# Patient Record
Sex: FEMALE | Race: WHITE | NOT HISPANIC OR LATINO | Employment: FULL TIME | URBAN - METROPOLITAN AREA
[De-identification: names, ages, dates, MRNs, and addresses within clinical notes are randomized per-mention and may not be internally consistent; named-entity substitution may affect disease eponyms.]

---

## 2019-03-25 ENCOUNTER — HOSPITAL ENCOUNTER (OUTPATIENT)
Facility: HOSPITAL | Age: 49
Setting detail: OBSERVATION
Discharge: HOME | End: 2019-03-27
Attending: EMERGENCY MEDICINE | Admitting: INTERNAL MEDICINE
Payer: COMMERCIAL

## 2019-03-25 ENCOUNTER — APPOINTMENT (EMERGENCY)
Dept: RADIOLOGY | Facility: HOSPITAL | Age: 49
End: 2019-03-25
Payer: COMMERCIAL

## 2019-03-25 DIAGNOSIS — E78.5 DYSLIPIDEMIA: ICD-10-CM

## 2019-03-25 DIAGNOSIS — E11.9 NEW ONSET TYPE 2 DIABETES MELLITUS (HCC): ICD-10-CM

## 2019-03-25 DIAGNOSIS — R07.9 CHEST PAIN: Primary | ICD-10-CM

## 2019-03-25 DIAGNOSIS — E11.9 DIABETES MELLITUS, NEW ONSET (HCC): ICD-10-CM

## 2019-03-25 DIAGNOSIS — G62.9 NEUROPATHY: ICD-10-CM

## 2019-03-25 PROBLEM — R73.9 HYPERGLYCEMIA: Status: ACTIVE | Noted: 2019-03-25

## 2019-03-25 PROBLEM — R03.0 ELEVATED BLOOD PRESSURE READING: Status: ACTIVE | Noted: 2019-03-25

## 2019-03-25 LAB
ALBUMIN SERPL BCP-MCNC: 4 G/DL (ref 3.5–5)
ALP SERPL-CCNC: 131 U/L (ref 46–116)
ALT SERPL W P-5'-P-CCNC: 39 U/L (ref 12–78)
ANION GAP SERPL CALCULATED.3IONS-SCNC: 10 MMOL/L (ref 4–13)
AST SERPL W P-5'-P-CCNC: 17 U/L (ref 5–45)
ATRIAL RATE: 73 BPM
BASOPHILS # BLD AUTO: 0.03 THOUSANDS/ΜL (ref 0–0.1)
BASOPHILS NFR BLD AUTO: 0 % (ref 0–1)
BILIRUB SERPL-MCNC: 0.4 MG/DL (ref 0.2–1)
BUN SERPL-MCNC: 11 MG/DL (ref 5–25)
CALCIUM SERPL-MCNC: 9.4 MG/DL (ref 8.3–10.1)
CHLORIDE SERPL-SCNC: 99 MMOL/L (ref 100–108)
CO2 SERPL-SCNC: 27 MMOL/L (ref 21–32)
CREAT SERPL-MCNC: 0.64 MG/DL (ref 0.6–1.3)
EOSINOPHIL # BLD AUTO: 0.21 THOUSAND/ΜL (ref 0–0.61)
EOSINOPHIL NFR BLD AUTO: 3 % (ref 0–6)
ERYTHROCYTE [DISTWIDTH] IN BLOOD BY AUTOMATED COUNT: 13.2 % (ref 11.6–15.1)
GFR SERPL CREATININE-BSD FRML MDRD: 104 ML/MIN/1.73SQ M
GLUCOSE SERPL-MCNC: 211 MG/DL (ref 65–140)
GLUCOSE SERPL-MCNC: 236 MG/DL (ref 65–140)
HCT VFR BLD AUTO: 41.6 % (ref 34.8–46.1)
HGB BLD-MCNC: 14.8 G/DL (ref 11.5–15.4)
IMM GRANULOCYTES # BLD AUTO: 0.02 THOUSAND/UL (ref 0–0.2)
IMM GRANULOCYTES NFR BLD AUTO: 0 % (ref 0–2)
LYMPHOCYTES # BLD AUTO: 2.96 THOUSANDS/ΜL (ref 0.6–4.47)
LYMPHOCYTES NFR BLD AUTO: 35 % (ref 14–44)
MCH RBC QN AUTO: 31 PG (ref 26.8–34.3)
MCHC RBC AUTO-ENTMCNC: 35.6 G/DL (ref 31.4–37.4)
MCV RBC AUTO: 87 FL (ref 82–98)
MONOCYTES # BLD AUTO: 0.61 THOUSAND/ΜL (ref 0.17–1.22)
MONOCYTES NFR BLD AUTO: 7 % (ref 4–12)
NEUTROPHILS # BLD AUTO: 4.72 THOUSANDS/ΜL (ref 1.85–7.62)
NEUTS SEG NFR BLD AUTO: 55 % (ref 43–75)
NRBC BLD AUTO-RTO: 0 /100 WBCS
P AXIS: 53 DEGREES
PLATELET # BLD AUTO: 288 THOUSANDS/UL (ref 149–390)
PMV BLD AUTO: 11.1 FL (ref 8.9–12.7)
POTASSIUM SERPL-SCNC: 3.8 MMOL/L (ref 3.5–5.3)
PR INTERVAL: 126 MS
PROT SERPL-MCNC: 7.6 G/DL (ref 6.4–8.2)
QRS AXIS: 0 DEGREES
QRSD INTERVAL: 82 MS
QT INTERVAL: 376 MS
QTC INTERVAL: 406 MS
RBC # BLD AUTO: 4.77 MILLION/UL (ref 3.81–5.12)
SODIUM SERPL-SCNC: 136 MMOL/L (ref 136–145)
T WAVE AXIS: 13 DEGREES
TROPONIN I SERPL-MCNC: <0.02 NG/ML
VENTRICULAR RATE: 70 BPM
WBC # BLD AUTO: 8.55 THOUSAND/UL (ref 4.31–10.16)

## 2019-03-25 PROCEDURE — 99285 EMERGENCY DEPT VISIT HI MDM: CPT

## 2019-03-25 PROCEDURE — 36415 COLL VENOUS BLD VENIPUNCTURE: CPT | Performed by: EMERGENCY MEDICINE

## 2019-03-25 PROCEDURE — 93010 ELECTROCARDIOGRAM REPORT: CPT | Performed by: INTERNAL MEDICINE

## 2019-03-25 PROCEDURE — 80053 COMPREHEN METABOLIC PANEL: CPT | Performed by: EMERGENCY MEDICINE

## 2019-03-25 PROCEDURE — 84484 ASSAY OF TROPONIN QUANT: CPT | Performed by: EMERGENCY MEDICINE

## 2019-03-25 PROCEDURE — 85025 COMPLETE CBC W/AUTO DIFF WBC: CPT | Performed by: EMERGENCY MEDICINE

## 2019-03-25 PROCEDURE — 82948 REAGENT STRIP/BLOOD GLUCOSE: CPT

## 2019-03-25 PROCEDURE — 93005 ELECTROCARDIOGRAM TRACING: CPT

## 2019-03-25 PROCEDURE — 99220 PR INITIAL OBSERVATION CARE/DAY 70 MINUTES: CPT | Performed by: INTERNAL MEDICINE

## 2019-03-25 PROCEDURE — 84484 ASSAY OF TROPONIN QUANT: CPT | Performed by: INTERNAL MEDICINE

## 2019-03-25 PROCEDURE — 71046 X-RAY EXAM CHEST 2 VIEWS: CPT

## 2019-03-25 RX ORDER — MAGNESIUM HYDROXIDE/ALUMINUM HYDROXICE/SIMETHICONE 120; 1200; 1200 MG/30ML; MG/30ML; MG/30ML
30 SUSPENSION ORAL EVERY 6 HOURS PRN
Status: DISCONTINUED | OUTPATIENT
Start: 2019-03-25 | End: 2019-03-27 | Stop reason: HOSPADM

## 2019-03-25 RX ORDER — ASPIRIN 325 MG
325 TABLET ORAL ONCE
Status: COMPLETED | OUTPATIENT
Start: 2019-03-25 | End: 2019-03-25

## 2019-03-25 RX ORDER — ACETAMINOPHEN 325 MG/1
650 TABLET ORAL EVERY 4 HOURS PRN
Status: DISCONTINUED | OUTPATIENT
Start: 2019-03-25 | End: 2019-03-27 | Stop reason: HOSPADM

## 2019-03-25 RX ORDER — HYDRALAZINE HYDROCHLORIDE 20 MG/ML
10 INJECTION INTRAMUSCULAR; INTRAVENOUS EVERY 6 HOURS PRN
Status: DISCONTINUED | OUTPATIENT
Start: 2019-03-25 | End: 2019-03-27 | Stop reason: HOSPADM

## 2019-03-25 RX ORDER — NITROGLYCERIN 0.4 MG/1
0.4 TABLET SUBLINGUAL
Status: DISCONTINUED | OUTPATIENT
Start: 2019-03-25 | End: 2019-03-27 | Stop reason: HOSPADM

## 2019-03-25 RX ORDER — TRAMADOL HYDROCHLORIDE 50 MG/1
50 TABLET ORAL EVERY 6 HOURS PRN
Status: DISCONTINUED | OUTPATIENT
Start: 2019-03-25 | End: 2019-03-27 | Stop reason: HOSPADM

## 2019-03-25 RX ORDER — MORPHINE SULFATE 4 MG/ML
4 INJECTION, SOLUTION INTRAMUSCULAR; INTRAVENOUS EVERY 4 HOURS PRN
Status: DISCONTINUED | OUTPATIENT
Start: 2019-03-25 | End: 2019-03-27 | Stop reason: HOSPADM

## 2019-03-25 RX ORDER — IBUPROFEN 600 MG/1
600 TABLET ORAL EVERY 6 HOURS PRN
Status: DISCONTINUED | OUTPATIENT
Start: 2019-03-25 | End: 2019-03-27 | Stop reason: HOSPADM

## 2019-03-25 RX ORDER — ONDANSETRON 2 MG/ML
4 INJECTION INTRAMUSCULAR; INTRAVENOUS EVERY 6 HOURS PRN
Status: DISCONTINUED | OUTPATIENT
Start: 2019-03-25 | End: 2019-03-27 | Stop reason: HOSPADM

## 2019-03-25 RX ORDER — ASPIRIN 81 MG/1
81 TABLET, CHEWABLE ORAL DAILY
Status: DISCONTINUED | OUTPATIENT
Start: 2019-03-26 | End: 2019-03-27 | Stop reason: HOSPADM

## 2019-03-25 RX ADMIN — INSULIN LISPRO 2 UNITS: 100 INJECTION, SOLUTION INTRAVENOUS; SUBCUTANEOUS at 22:36

## 2019-03-25 RX ADMIN — ASPIRIN 325 MG: 325 TABLET ORAL at 15:04

## 2019-03-25 NOTE — H&P
History and Physical - [facility] Internal Medicine    Patient Information: Jay Fothergill 46 y o  female MRN: [id number]  Unit/Bed#: PAPI Encounter: [id number]  Admitting Physician: Milagro Arroyo DO  PCP: Fran Verdin MD  Date of Admission:  03/25/19    Assessment/Plan:    Hospital Problem List:     Principal Problem:    Chest pain  Active Problems:    Hyperglycemia    Elevated blood pressure reading      Plan for the Primary Problem(s):  · Chest Pain -   · DARRON 1-2  · Trend troponins  Additional EKG testing with each troponin  · Telemetry monitoring  · For now, PRN nitro and scheduled 81 mg aspirin  · Given scattered ST changes and history of Lyme disease in past, will get Echocardiogram   · Most likely outpatient stress test unless additional concern  · Secondary risk assessments (A1c, monitoring of BP, and lipid profile)  · Former smoker, but not currently smoking  · Tylenol for mild pain, Motrin for moderate pain and tramadol for severe pain  Morphine for breakthrough pain  Plan for Additional Problems:   · Hyperglycemia - Check A1c  No known history of Diabetes  If diagnosed with diabetes this admission, would be a new diagnosis and would need diabetes education and appropriate medication initiation based on A1c level  For now just sliding scale insulin  · Elevated Blood Pressure - Add PRN hydralazine  Otherwise trend pressures at the present time  We only have a single blood pressure reading so far  No prior diagnosis of hypertension before  · Recent Bronchitis - treated 3/8 - 3/13 with Azithromycin and phenergan / codeine  No current symptoms, so not likely to be a factor in current chest pain complaint  VTE Prophylaxis: Enoxaparin (Lovenox)  / sequential compression device   Code Status: Level 1 Full Code    POLST: There is no POLST form on file for this patient (pre-hospital)    Anticipated Length of Stay:  Patient will be admitted on an Observation basis with an anticipated length of stay of < 2 midnights  Justification for Hospital Stay: Evaluation of chest pain  Total Time for Visit, including Counseling / Coordination of Care: 45 minutes  Greater than 50% of this total time spent on direct patient counseling and coordination of care  Chief Complaint:   Chest Pain    History of Present Illness:    Archana Sutherland is a 46 y o  female who presents with chest pain/pressure  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 and stated that she was on a one-to-one observation with the patient  The patient was well behaved and there was no anxiety present at that time  However, while sitting on the one-to-one observation, the patient began having a squeezing pressure type pain in the center of her chest that radiated towards the left shoulder  The patient did not have pain in the arm, neck, or back  The patient tried eating feeling that this may help with the symptoms but this did not seem to make any change  The patient did relate some nausea to this as well and the nausea improved after taking some ginger ale  The patient denied any shortness of breath, diaphoresis, palpitations, or dizziness  The patient states that she felt generally tired but also does admit that she only slept about 4 hours last night  The patient states that the only alleviating and exacerbating factor that she was able to determine was that pain would worsen when she thought about it and when improved when she was not thinking about it  The patient did not have any injuries to the chest and she did not have any recent heavy lifting, pushing/pulling, or prolonged overhead activity  The patient denies any numbness or paresthesias  The patient has not had any recent leg edema, calf pain, or orthopnea symptoms  The patient denies any current coughing although she was treated for bronchitis in the beginning of this month  The patient denies any associated fevers or chills    The patient states that when she walked the pain did not get worse  The patient came to the emergency department for evaluation and was given aspirin 325 mg  She states that this improved the pain to some degree but it is still not gone altogether  The patient denies any melena or hematochezia  The patient has had no vomiting  An EKG was done in the emergency department with some minimal J-point elevation in various leads which were non-contiguous in nature  Review of Systems:    Review of Systems   Constitutional: Positive for fatigue  Negative for activity change, appetite change, chills, diaphoresis and fever  HENT: Negative for congestion, mouth sores, rhinorrhea, sinus pressure, sinus pain, sore throat and tinnitus  Respiratory: Positive for chest tightness  Negative for shortness of breath  Cardiovascular: Positive for chest pain  Negative for palpitations and leg swelling  Gastrointestinal: Positive for nausea  Negative for abdominal pain, constipation, diarrhea and vomiting  Endocrine: Negative  Genitourinary: Negative  Negative for dysuria, flank pain, hematuria and pelvic pain  Musculoskeletal: Negative for arthralgias, back pain, myalgias and neck pain  Allergic/Immunologic: Negative  Neurological: Negative for dizziness, syncope, weakness, light-headedness, numbness and headaches  Hematological: Negative  Psychiatric/Behavioral: Negative  All other systems reviewed and are negative  Past Medical and Surgical History:     Past Medical History:   Diagnosis Date    Lyme disease     treated 6 years ago - 1+ month medication       Past Surgical History:   Procedure Laterality Date    CHOLECYSTECTOMY         Meds/Allergies:    I have reviewed home medications with patient personally      Allergies: No Known Allergies    Social History:     Marital Status: Single     Substance Use History:   Social History     Substance and Sexual Activity   Alcohol Use Never    Frequency: Never     Social History     Tobacco Use   Smoking Status Former Smoker    Packs/day: 1 00    Years: 18 00    Pack years: 18 00    Types: Cigarettes    Last attempt to quit: 3/25/2009    Years since quitting: 10 0   Smokeless Tobacco Never Used     Social History     Substance and Sexual Activity   Drug Use Never       Family History:    Family History   Problem Relation Age of Onset    Deep vein thrombosis Mother     Hypertension Mother     COPD Father     Diabetes Father     Hypertension Sister     Heart failure Maternal Grandmother          age 71    Heart disease Maternal Grandfather     Heart disease Paternal Grandfather        Physical Exam:     Vitals:   Blood Pressure: 155/69 (19 1815)  Pulse: 71 (19 1815)  Temperature: 97 9 °F (36 6 °C) (19 1436)  Temp Source: Oral (19 1436)  Respirations: 16 (19 181)  Weight - Scale: 75 7 kg (166 lb 12 8 oz) (19 1441)  SpO2: 98 % (19 1436)    Physical Exam   Constitutional: She is oriented to person, place, and time  She appears well-nourished  No distress  HENT:   Head: Normocephalic and atraumatic  Mouth/Throat: Oropharynx is clear and moist  No oropharyngeal exudate  Eyes: Pupils are equal, round, and reactive to light  Neck: Neck supple  No JVD present  No tracheal deviation present  Cardiovascular: Normal rate, regular rhythm and intact distal pulses  Exam reveals no friction rub  No murmur heard  Pulmonary/Chest: Effort normal and breath sounds normal  No respiratory distress  She has no wheezes  She has no rales  She exhibits tenderness (This is present near the anterior aspect of the left shoulder)  Musculoskeletal: She exhibits no edema or tenderness  Lymphadenopathy:     She has no cervical adenopathy  Neurological: She is alert and oriented to person, place, and time  No cranial nerve deficit or sensory deficit  She exhibits normal muscle tone  Skin: Skin is warm and dry  No rash noted  Psychiatric: She has a normal mood and affect  Vitals reviewed  Additional Data:     Lab Results: I have personally reviewed pertinent reports  Results from last 7 days   Lab Units 03/25/19  1453   WBC Thousand/uL 8 55   HEMOGLOBIN g/dL 14 8   HEMATOCRIT % 41 6   PLATELETS Thousands/uL 288   NEUTROS PCT % 55   LYMPHS PCT % 35   MONOS PCT % 7   EOS PCT % 3     Results from last 7 days   Lab Units 03/25/19  1453   POTASSIUM mmol/L 3 8   CHLORIDE mmol/L 99*   CO2 mmol/L 27   BUN mg/dL 11   CREATININE mg/dL 0 64   CALCIUM mg/dL 9 4   ALK PHOS U/L 131*   ALT U/L 39   AST U/L 17           Imaging: I have personally reviewed pertinent reports  Xr Chest 2 Views    Result Date: 3/25/2019  Narrative: CHEST INDICATION:   chest pain  Chest Pain (Pt states that she was at work and started with chest pressure  ) COMPARISON:  None EXAM PERFORMED/VIEWS:  XR CHEST PA & LATERAL FINDINGS: Cardiomediastinal silhouette appears unremarkable  The lungs are clear  No pneumothorax or pleural effusion  Age-appropriate degenerative changes are noted in the spine  Surgical clips noted in the right upper quadrant, likely related to prior cholecystectomy  Impression: No acute cardiopulmonary disease  Workstation performed: [id number]       EKG, Pathology, and Other Studies Reviewed on Admission:   · EKG:  Normal sinus rhythm with a rate of 70 beats per minute  There is some early ST he/J point elevation in leads 1,2, and aVL  T inversion in lead 3  Allscripts / Epic Records Reviewed: Yes     ** Please Note: This note has been constructed using a voice recognition system   **

## 2019-03-25 NOTE — ED PROVIDER NOTES
History  Chief Complaint   Patient presents with    Chest Pain     Pt states that she was at work and started with chest pressure  HPI     70-year-old female with history of hyperlipidemia who presents for evaluation of substernal chest pressure that started at 10:30 a m  this morning when she was at work  Patient was at rest when the pain started, was initially mild in intensity, then felt a severe pressure in her chest like someone was sitting on her, is now mild  Pain radiated to the bilateral shoulders  No radiation to the arms, back, or neck  Pain was nonexertional, nonpleuritic  She denies shortness of breath  No cough, fevers, or chills  No diaphoresis  She is nauseous, but denies vomiting  No personal cardiac history, does have an extensive history of CAD  She has untreated HTN and HLD, denies diabetes  Does not smoke  She is not on estrogen medications  Denies calf swelling or tenderness  Discomfort is currently rated as mild  No aggravating or alleviating factors or other associated symptoms  None       Past Medical History:   Diagnosis Date    Lyme disease     treated 6 years ago - 1+ month medication       Past Surgical History:   Procedure Laterality Date    CHOLECYSTECTOMY         Family History   Problem Relation Age of Onset    Deep vein thrombosis Mother     Hypertension Mother    Fuentes COPD Father     Diabetes Father     Hypertension Sister     Heart failure Maternal Grandmother          age 71    Heart disease Maternal Grandfather     Heart disease Paternal Grandfather      I have reviewed and agree with the history as documented      Social History     Tobacco Use    Smoking status: Former Smoker     Packs/day: 1 00     Years: 18 00     Pack years: 18 00     Types: Cigarettes     Last attempt to quit: 3/25/2009     Years since quitting: 10 0    Smokeless tobacco: Never Used   Substance Use Topics    Alcohol use: Never     Frequency: Never    Drug use: Never Review of Systems   Constitutional: Negative for chills and fever  HENT: Negative for congestion  Eyes: Negative for visual disturbance  Respiratory: Negative for cough and shortness of breath  Cardiovascular: Positive for chest pain  Negative for palpitations and leg swelling  Gastrointestinal: Positive for nausea  Negative for abdominal pain, diarrhea and vomiting  Genitourinary: Negative for dysuria and frequency  Musculoskeletal: Negative for arthralgias, back pain, neck pain and neck stiffness  Skin: Negative for rash  Neurological: Negative for weakness, numbness and headaches  Psychiatric/Behavioral: Negative for agitation, behavioral problems and confusion  Physical Exam  Physical Exam   Constitutional: She is oriented to person, place, and time  She appears well-developed and well-nourished  No distress  HENT:   Head: Normocephalic and atraumatic  Right Ear: External ear normal    Left Ear: External ear normal    Nose: Nose normal    Mouth/Throat: Oropharynx is clear and moist    Eyes: Conjunctivae are normal    Neck: Normal range of motion  Neck supple  Cardiovascular: Normal rate, regular rhythm, normal heart sounds and intact distal pulses  Exam reveals no gallop and no friction rub  No murmur heard  Pulmonary/Chest: Effort normal and breath sounds normal  No respiratory distress  She has no wheezes  She has no rales  Abdominal: Soft  Bowel sounds are normal  She exhibits no distension  There is no tenderness  There is no guarding  Musculoskeletal: Normal range of motion  She exhibits no edema or deformity  Right lower leg: She exhibits no edema  Left lower leg: She exhibits no edema  Neurological: She is alert and oriented to person, place, and time  She exhibits normal muscle tone  Skin: Skin is warm and dry  She is not diaphoretic         Vital Signs  ED Triage Vitals [03/25/19 1436]   Temperature Pulse Respirations Blood Pressure SpO2 97 9 °F (36 6 °C) 70 16 158/69 98 %      Temp Source Heart Rate Source Patient Position - Orthostatic VS BP Location FiO2 (%)   Oral Monitor Sitting Left arm --      Pain Score       No Pain           Vitals:    03/25/19 1436 03/25/19 1815 03/25/19 1845 03/25/19 2211   BP: 158/69 155/69 140/66 132/63   Pulse: 70 71 71 71   Patient Position - Orthostatic VS: Sitting Sitting Lying Lying         Visual Acuity      ED Medications  Medications   ondansetron (ZOFRAN) injection 4 mg (has no administration in time range)   aluminum-magnesium hydroxide-simethicone (MYLANTA) 200-200-20 mg/5 mL oral suspension 30 mL (has no administration in time range)   nitroglycerin (NITROSTAT) SL tablet 0 4 mg (has no administration in time range)   aspirin chewable tablet 81 mg (has no administration in time range)   enoxaparin (LOVENOX) subcutaneous injection 40 mg (has no administration in time range)   insulin lispro (HumaLOG) 100 units/mL subcutaneous injection 1-6 Units (has no administration in time range)   insulin lispro (HumaLOG) 100 units/mL subcutaneous injection 1-5 Units (2 Units Subcutaneous Given 3/25/19 2236)   acetaminophen (TYLENOL) tablet 650 mg (has no administration in time range)   ibuprofen (MOTRIN) tablet 600 mg (has no administration in time range)   traMADol (ULTRAM) tablet 50 mg (has no administration in time range)   morphine (PF) 4 mg/mL injection 4 mg (has no administration in time range)   hydrALAZINE (APRESOLINE) injection 10 mg (has no administration in time range)   aspirin tablet 325 mg (325 mg Oral Given 3/25/19 1504)       Diagnostic Studies  Results Reviewed     Procedure Component Value Units Date/Time    Troponin I [[id number]]  (Normal) Collected:  03/25/19 1814    Lab Status:  Final result Specimen:  Blood from Arm, Right Updated:  03/25/19 1841     Troponin I <0 02 ng/mL     Troponin I [[id number]]  (Normal) Collected:  03/25/19 1453    Lab Status:  Final result Specimen:  Blood from Arm, Right Updated:  03/25/19 1520     Troponin I <0 02 ng/mL     Comprehensive metabolic panel [[id number]]  (Abnormal) Collected:  03/25/19 1453    Lab Status:  Final result Specimen:  Blood from Arm, Right Updated:  03/25/19 1517     Sodium 136 mmol/L      Potassium 3 8 mmol/L      Chloride 99 mmol/L      CO2 27 mmol/L      ANION GAP 10 mmol/L      BUN 11 mg/dL      Creatinine 0 64 mg/dL      Glucose 211 mg/dL      Calcium 9 4 mg/dL      AST 17 U/L      ALT 39 U/L      Alkaline Phosphatase 131 U/L      Total Protein 7 6 g/dL      Albumin 4 0 g/dL      Total Bilirubin 0 40 mg/dL      eGFR 104 ml/min/1 73sq m     Narrative:       National Kidney Disease Education Program recommendations are as follows:  GFR calculation is accurate only with a steady state creatinine  Chronic Kidney disease less than 60 ml/min/1 73 sq  meters  Kidney failure less than 15 ml/min/1 73 sq  meters  CBC and differential [[id number]] Collected:  03/25/19 1453    Lab Status:  Final result Specimen:  Blood from Arm, Right Updated:  03/25/19 1501     WBC 8 55 Thousand/uL      RBC 4 77 Million/uL      Hemoglobin 14 8 g/dL      Hematocrit 41 6 %      MCV 87 fL      MCH 31 0 pg      MCHC 35 6 g/dL      RDW 13 2 %      MPV 11 1 fL      Platelets 516 Thousands/uL      nRBC 0 /100 WBCs      Neutrophils Relative 55 %      Immat GRANS % 0 %      Lymphocytes Relative 35 %      Monocytes Relative 7 %      Eosinophils Relative 3 %      Basophils Relative 0 %      Neutrophils Absolute 4 72 Thousands/µL      Immature Grans Absolute 0 02 Thousand/uL      Lymphocytes Absolute 2 96 Thousands/µL      Monocytes Absolute 0 61 Thousand/µL      Eosinophils Absolute 0 21 Thousand/µL      Basophils Absolute 0 03 Thousands/µL     POCT pregnancy, urine [[id number]]     Lab Status:  No result                  XR chest 2 views   Final Result by Shraddha Huerta MD (03/25 5120)      No acute cardiopulmonary disease              Workstation performed: [id number] Procedures  Procedures       Phone Contacts  ED Phone Contact    ED Course         HEART Risk Score      Most Recent Value   History  1 Filed at: 03/25/2019 1621   ECG  1 Filed at: 03/25/2019 1621   Age  1 Filed at: 03/25/2019 1621   Risk Factors  2 Filed at: 03/25/2019 1621   Troponin  0 Filed at: 03/25/2019 1621   Heart Score Risk Calculator   History  1 Filed at: 03/25/2019 1621   ECG  1 Filed at: 03/25/2019 1621   Age  1 Filed at: 03/25/2019 1621   Risk Factors  2 Filed at: 03/25/2019 1621   Troponin  0 Filed at: 03/25/2019 1621   HEART Score  5 Filed at: 03/25/2019 1621   HEART Score  5 Filed at: 03/25/2019 1621                    DARRON Risk Score      Most Recent Value   Age >= 72  0 Filed at: 03/25/2019 1816   Known CAD (stenosis >= 50%)  0 Filed at: 03/25/2019 1816   Recent (<=24 hrs) Service Angina  1 Filed at: 03/25/2019 1816   ST Deviation >= 0 5 mm  0 Filed at: 03/25/2019 1816   3+ CAD Risk Factors (FHx, HTN, HLP, DM, Smoker)  1 Filed at: 03/25/2019 1816   Aspirin Use Past 7 Days  0 Filed at: 03/25/2019 1816   Elevated Cardiac Markers  0 Filed at: 03/25/2019 1816   DARRON Risk Score (Calculated)  2 Filed at: 03/25/2019 1816              MDM  Number of Diagnoses or Management Options  Chest pain: new and requires workup  Diagnosis management comments: Generally well appearing  Afebrile and hemodynamically stable, but hypertensive to 158/69  Patient tells me that she has untreated hypertension as well as hyperlipidemia  She has minimal 2 out of 10 chest pressure currently that radiates to the shoulders  Concerning features include radiation and the fact that is accompanied by nausea  I personally interpreted her EKG, which reveals normal rate, normal sinus rhythm, less than 2 mm of ST-elevation in leads 1, aVL, and lead to that are concave resembling early repolarization rather than acute injury, no reciprocal depression, T-wave inversion in lead 3  First troponin is undetectable    Patient has a heart score of 5  Chest x-ray with no cardiopulmonary abnormalities  Labs concerning for possible new-onset DM, with blood glucose of 211  Will admit for tele obs for further chest pain rule-out  Pt admitted in stable condition  Amount and/or Complexity of Data Reviewed  Clinical lab tests: ordered and reviewed  Tests in the radiology section of CPT®: ordered and reviewed  Independent visualization of images, tracings, or specimens: yes    Patient Progress  Patient progress: stable           Disposition  Final diagnoses:   Chest pain     Time reflects when diagnosis was documented in both MDM as applicable and the Disposition within this note     Time User Action Codes Description Comment    3/25/2019  4:54 PM Bret Deras Add [R07 9] Chest pain       ED Disposition     ED Disposition Condition Date/Time Comment    Admit Stable Mon Mar 25, 2019  4:54 PM Case was discussed with RAUL and the patient's admission status was agreed to be Admission Status: observation status to the service of Dr Borjas Check   Follow-up Information    None         There are no discharge medications for this patient  No discharge procedures on file      ED Provider  Electronically Signed by           Luz Elena Martínez MD  03/26/19 6185       Luz Elena Martínez MD  03/26/19 1606

## 2019-03-25 NOTE — PLAN OF CARE
Problem: CARDIOVASCULAR - ADULT  Goal: Maintains optimal cardiac output and hemodynamic stability  Description  INTERVENTIONS:  - Monitor I/O, vital signs and rhythm  - Monitor for S/S and trends of decreased cardiac output i e  bleeding, hypotension  - Administer and titrate ordered vasoactive medications to optimize hemodynamic stability  - Assess quality of pulses, skin color and temperature  - Assess for signs of decreased coronary artery perfusion - ex   Angina  - Instruct patient to report change in severity of symptoms  Outcome: Progressing  Goal: Absence of cardiac dysrhythmias or at baseline rhythm  Description  INTERVENTIONS:  - Continuous cardiac monitoring, monitor vital signs, obtain 12 lead EKG if indicated  - Administer antiarrhythmic and heart rate control medications as ordered  - Monitor electrolytes and administer replacement therapy as ordered  Outcome: Progressing

## 2019-03-26 ENCOUNTER — APPOINTMENT (OUTPATIENT)
Dept: NON INVASIVE DIAGNOSTICS | Facility: HOSPITAL | Age: 49
End: 2019-03-26
Payer: COMMERCIAL

## 2019-03-26 PROBLEM — E78.5 HYPERLIPIDEMIA: Status: ACTIVE | Noted: 2019-03-26

## 2019-03-26 PROBLEM — E11.9 DIABETES MELLITUS, NEW ONSET (HCC): Status: ACTIVE | Noted: 2019-03-26

## 2019-03-26 LAB
ANION GAP SERPL CALCULATED.3IONS-SCNC: 9 MMOL/L (ref 4–13)
ATRIAL RATE: 69 BPM
BUN SERPL-MCNC: 10 MG/DL (ref 5–25)
CALCIUM SERPL-MCNC: 9 MG/DL (ref 8.3–10.1)
CHLORIDE SERPL-SCNC: 103 MMOL/L (ref 100–108)
CHOLEST SERPL-MCNC: 206 MG/DL (ref 50–200)
CO2 SERPL-SCNC: 28 MMOL/L (ref 21–32)
CREAT SERPL-MCNC: 0.6 MG/DL (ref 0.6–1.3)
ERYTHROCYTE [DISTWIDTH] IN BLOOD BY AUTOMATED COUNT: 13.2 % (ref 11.6–15.1)
EST. AVERAGE GLUCOSE BLD GHB EST-MCNC: 212 MG/DL
GFR SERPL CREATININE-BSD FRML MDRD: 107 ML/MIN/1.73SQ M
GLUCOSE P FAST SERPL-MCNC: 173 MG/DL (ref 65–99)
GLUCOSE SERPL-MCNC: 173 MG/DL (ref 65–140)
GLUCOSE SERPL-MCNC: 188 MG/DL (ref 65–140)
GLUCOSE SERPL-MCNC: 215 MG/DL (ref 65–140)
GLUCOSE SERPL-MCNC: 234 MG/DL (ref 65–140)
GLUCOSE SERPL-MCNC: 241 MG/DL (ref 65–140)
HBA1C MFR BLD: 9 % (ref 4.2–6.3)
HCT VFR BLD AUTO: 40.2 % (ref 34.8–46.1)
HDLC SERPL-MCNC: 40 MG/DL (ref 40–60)
HGB BLD-MCNC: 14.2 G/DL (ref 11.5–15.4)
MCH RBC QN AUTO: 30.9 PG (ref 26.8–34.3)
MCHC RBC AUTO-ENTMCNC: 35.3 G/DL (ref 31.4–37.4)
MCV RBC AUTO: 88 FL (ref 82–98)
NONHDLC SERPL-MCNC: 166 MG/DL
P AXIS: 59 DEGREES
PLATELET # BLD AUTO: 248 THOUSANDS/UL (ref 149–390)
PMV BLD AUTO: 11.3 FL (ref 8.9–12.7)
POTASSIUM SERPL-SCNC: 3.8 MMOL/L (ref 3.5–5.3)
PR INTERVAL: 138 MS
QRS AXIS: 4 DEGREES
QRSD INTERVAL: 86 MS
QT INTERVAL: 406 MS
QTC INTERVAL: 435 MS
RBC # BLD AUTO: 4.59 MILLION/UL (ref 3.81–5.12)
SODIUM SERPL-SCNC: 140 MMOL/L (ref 136–145)
T WAVE AXIS: 31 DEGREES
TRIGL SERPL-MCNC: 432 MG/DL
VENTRICULAR RATE: 69 BPM
WBC # BLD AUTO: 7.61 THOUSAND/UL (ref 4.31–10.16)

## 2019-03-26 PROCEDURE — 82948 REAGENT STRIP/BLOOD GLUCOSE: CPT

## 2019-03-26 PROCEDURE — 99225 PR SBSQ OBSERVATION CARE/DAY 25 MINUTES: CPT | Performed by: INTERNAL MEDICINE

## 2019-03-26 PROCEDURE — 93306 TTE W/DOPPLER COMPLETE: CPT

## 2019-03-26 PROCEDURE — 80048 BASIC METABOLIC PNL TOTAL CA: CPT | Performed by: INTERNAL MEDICINE

## 2019-03-26 PROCEDURE — 83036 HEMOGLOBIN GLYCOSYLATED A1C: CPT | Performed by: INTERNAL MEDICINE

## 2019-03-26 PROCEDURE — 93306 TTE W/DOPPLER COMPLETE: CPT | Performed by: INTERNAL MEDICINE

## 2019-03-26 PROCEDURE — 99244 OFF/OP CNSLTJ NEW/EST MOD 40: CPT | Performed by: INTERNAL MEDICINE

## 2019-03-26 PROCEDURE — 85027 COMPLETE CBC AUTOMATED: CPT | Performed by: INTERNAL MEDICINE

## 2019-03-26 PROCEDURE — 93010 ELECTROCARDIOGRAM REPORT: CPT | Performed by: INTERNAL MEDICINE

## 2019-03-26 PROCEDURE — 80061 LIPID PANEL: CPT | Performed by: INTERNAL MEDICINE

## 2019-03-26 RX ORDER — PRAVASTATIN SODIUM 40 MG
40 TABLET ORAL
Status: DISCONTINUED | OUTPATIENT
Start: 2019-03-26 | End: 2019-03-27 | Stop reason: HOSPADM

## 2019-03-26 RX ORDER — INSULIN GLARGINE 100 [IU]/ML
12 INJECTION, SOLUTION SUBCUTANEOUS
Status: DISCONTINUED | OUTPATIENT
Start: 2019-03-26 | End: 2019-03-27

## 2019-03-26 RX ORDER — GABAPENTIN 100 MG/1
100 CAPSULE ORAL 3 TIMES DAILY
Status: DISCONTINUED | OUTPATIENT
Start: 2019-03-26 | End: 2019-03-27 | Stop reason: HOSPADM

## 2019-03-26 RX ORDER — INSULIN GLARGINE 100 [IU]/ML
15 INJECTION, SOLUTION SUBCUTANEOUS
Status: DISCONTINUED | OUTPATIENT
Start: 2019-03-26 | End: 2019-03-26

## 2019-03-26 RX ADMIN — INSULIN GLARGINE 12 UNITS: 100 INJECTION, SOLUTION SUBCUTANEOUS at 21:42

## 2019-03-26 RX ADMIN — INSULIN LISPRO 3 UNITS: 100 INJECTION, SOLUTION INTRAVENOUS; SUBCUTANEOUS at 18:46

## 2019-03-26 RX ADMIN — INSULIN LISPRO 3 UNITS: 100 INJECTION, SOLUTION INTRAVENOUS; SUBCUTANEOUS at 14:48

## 2019-03-26 RX ADMIN — GABAPENTIN 100 MG: 100 CAPSULE ORAL at 21:41

## 2019-03-26 RX ADMIN — INSULIN LISPRO 1 UNITS: 100 INJECTION, SOLUTION INTRAVENOUS; SUBCUTANEOUS at 21:46

## 2019-03-26 RX ADMIN — INSULIN LISPRO 1 UNITS: 100 INJECTION, SOLUTION INTRAVENOUS; SUBCUTANEOUS at 09:08

## 2019-03-26 RX ADMIN — ASPIRIN 81 MG 81 MG: 81 TABLET ORAL at 09:07

## 2019-03-26 RX ADMIN — PRAVASTATIN SODIUM 40 MG: 40 TABLET ORAL at 17:37

## 2019-03-26 RX ADMIN — GABAPENTIN 100 MG: 100 CAPSULE ORAL at 17:37

## 2019-03-26 RX ADMIN — ENOXAPARIN SODIUM 40 MG: 40 INJECTION SUBCUTANEOUS at 09:07

## 2019-03-26 NOTE — UTILIZATION REVIEW
Initial Clinical Review    Admission: Date/Time/Statement: OBSERVATION ADMISSION 03/25/19 1655  Orders Placed This Encounter   Procedures    Place in Observation (expected length of stay for this patient is less than two midnights)     Standing Status:   Standing     Number of Occurrences:   1     Order Specific Question:   Admitting Physician     Answer:   Bettie Schmitz     Order Specific Question:   Level of Care     Answer:   Med Surg [16]     ED: Date/Time/Mode of Arrival:   ED Arrival Information     Expected Arrival Acuity Means of Arrival Escorted By Service Admission Type    - 3/25/2019 14:22 Emergent Walk-In Self Hospitalist Emergency    Arrival Complaint    CHEST PAIN        Chief Complaint:   Chief Complaint   Patient presents with    Chest Pain     Pt states that she was at work and started with chest pressure  Assessment/Plan: 46 y o  Female presents from home with chest pain/pressure  Patient was sitting at work on a One-one situation, non stressed at the moment she felt a squeezing pressure centrally that radiates toward the left shoulder  Only aggravating factor- when she focused on the pain it intensified  Upon provider exam: she is positive for chest tightness and chest pain  Plan for the Primary Problem(s):  · Chest Pain -   ? DARRON 1-2  ? Trend troponins  Additional EKG testing with each troponin  ? Telemetry monitoring  ? For now, PRN nitro and scheduled 81 mg aspirin  ? Given scattered ST changes and history of Lyme disease in past, will get Echocardiogram   ? Most likely outpatient stress test unless additional concern  ? Secondary risk assessments (A1c, monitoring of BP, and lipid profile)  ? Former smoker, but not currently smoking  ? Tylenol for mild pain, Motrin for moderate pain and tramadol for severe pain  Morphine for breakthrough pain      Plan for Additional Problems:   · Hyperglycemia - Check A1c  No known history of Diabetes    If diagnosed with diabetes this admission, would be a new diagnosis and would need diabetes education and appropriate medication initiation based on A1c level  For now just sliding scale insulin  · Elevated Blood Pressure - Add PRN hydralazine  Otherwise trend pressures at the present time  We only have a single blood pressure reading so far  No prior diagnosis of hypertension before  · Recent Bronchitis - treated 3/8 - 3/13 with Azithromycin and phenergan / codeine  No current symptoms, so not likely to be a factor in current chest pain complaint    Anticipated Length of Stay:  Patient will be admitted on an Observation basis with an anticipated length of stay of < 2 midnights  Justification for Hospital Stay: Evaluation of chest pain  ED Vital Signs:   ED Triage Vitals [03/25/19 1436]   Temperature Pulse Respirations Blood Pressure SpO2   97 9 °F (36 6 °C) 70 16 158/69 98 %      Temp Source Heart Rate Source Patient Position - Orthostatic VS BP Location FiO2 (%)   Oral Monitor Sitting Left arm --      Pain Score       No Pain        Wt Readings from Last 1 Encounters:   03/25/19 75 6 kg (166 lb 10 7 oz)     Vital Signs (abnormal): none  Pertinent Labs/Diagnostic Test Results: 3/25  Chloride 99, glucose 211, alk phos 131, trops <0 02 x3;   ECG: Normal sinus rhythm  Nonspecific ST abnormality  3/26 cholesterol 206, triglycerides 432, hgbA 1c=9,       ED Treatment:   Medication Administration from 03/25/2019 1422 to 03/25/2019 1840       Date/Time Order Dose Route Action Action by Comments     03/25/2019 1504 aspirin tablet 325 mg 325 mg Oral Given Graham Phillip RN         Past Medical/Surgical History:    Active Ambulatory Problems     Diagnosis Date Noted    No Active Ambulatory Problems     Resolved Ambulatory Problems     Diagnosis Date Noted    No Resolved Ambulatory Problems     Past Medical History:   Diagnosis Date    Lyme disease      Admitting Diagnosis: Chest pain [R07 9]  Age/Sex: 46 y o  female     Admission Orders:  48 hr telemetry  Peripheral IV  Echo  POCT pregnancy test  Diet jacinta/CHO controlled  Up OOB    Scheduled Meds:   Current Facility-Administered Medications:  acetaminophen 650 mg Oral Q4H PRN Gladys Shaker, DO   aluminum-magnesium hydroxide-simethicone 30 mL Oral Q6H PRN Gladys Shaker, DO   aspirin 81 mg Oral Daily Gladys Shaker, DO   enoxaparin 40 mg Subcutaneous Daily Gladys Shaker, DO   hydrALAZINE 10 mg Intravenous Q6H PRN Gladys Shaker, DO   ibuprofen 600 mg Oral Q6H PRN Gladys Shaker, DO   insulin lispro 1-5 Units Subcutaneous HS Gladys Shaker, DO   insulin lispro 1-6 Units Subcutaneous TID AC Ho Dumont, DO   morphine injection 4 mg Intravenous Q4H PRN Gladys Shaker, DO   nitroglycerin 0 4 mg Sublingual Q5 Min PRN Gladys Shaker, DO   ondansetron 4 mg Intravenous Q6H PRN Gladys Shaker, DO   traMADol 50 mg Oral Q6H PRN Gladys Shaker, DO       Network Utilization Review Department  Phone: [phone]; Fax [phone]  [email or web address]  ATTENTION: Please call with any questions or concerns to [phone]  and carefully listen to the prompts so that you are directed to the right person  Send all requests for admission clinical reviews, approved or denied determinations and any other requests to fax [phone]   All voicemails are confidential

## 2019-03-26 NOTE — ASSESSMENT & PLAN NOTE
- serial troponins negative  - await echocardiogram  - in light of intermittent recurrence and new diagnosis of hyperlipidemia/diabetes mellitus, plan for stress test tomorrow  - PRN SL NTG on board   - continue ASA daily - maintain oxygenation

## 2019-03-26 NOTE — CONSULTS
Consultation - Channing Jeffries 46 y o  female MRN: [id number]    Unit/Bed#: -01 Encounter: [id number]      Assessment/Plan     Assessment: This is a 46y o -year-old female with new onset of type 2 diabetes with hyperglycemia, chest pain, hyperlipidemia  Plan:  1  Type 2 diabetes with hyperglycemia-start Lantus 12 units at bedtime  -start Humalog 3 units with meals plus scale  -continue Humalog scale at bedtime  -for discharge, will recommend Lantus plus oral medications  -start diabetes education, insulin pen teaching    2  Chest pain-scheduled for stress test tomorrow    3  Hyperlipidemia-triglycerides elevated up to 432  Total cholesterol is elevated  She was started on pravastatin 40 mg daily by Cardiology  Also recommend to add fish oil 1000 mg twice a day on discharge    Lab Results   Component Value Date    [address] [email or web address]  03/26/2019      Comment:      Calculated LDL invalid, triglycerides >400 mg/dl  This screening LDL is a calculated result  It does not have the accuracy of the Direct Measured LDL in the monitoring of patients with hyperlipidemia and/or statin therapy  Direct Measure LDL (SJB615) must be ordered separately in these patients  Thank you for consulting Endocrinology, please call if you have any questions  CC: Diabetes Consult    History of Present Illness     HPI: Channing Jeffries is a 46y o  year old female with type 2 diabetes, new onset, was admitted for chest pain 7/10, associated with nausea and belly pain, troponins were negative  She is scheduled for stress test tomorrow  She was also found to have elevated blood sugars and A1c of 9%  At home she does not take any medication for diabetes, does not follow diet    Blood sugar in the hospital are in 180-240 range  She is getting Humalog as per scale  She denies thyroid problems  She also complains of tingling and numbness in her extremities, blurry vision off and on  She complains of excessive urination, excessive thirst    Inpatient consult to Endocrinology  Consult performed by: Geovanny Robertson MD  Consult ordered by: Maren Matthews MD          Review of Systems   Constitutional: Positive for activity change, fatigue and unexpected weight change  Negative for diaphoresis and fever  HENT: Negative  Eyes: Negative for visual disturbance  Respiratory: Negative for cough, chest tightness and shortness of breath  Cardiovascular: Negative for chest pain, palpitations and leg swelling  Gastrointestinal: Negative for abdominal pain, blood in stool, constipation, diarrhea, nausea and vomiting  Endocrine: Positive for polydipsia, polyphagia and polyuria  Negative for cold intolerance and heat intolerance  Genitourinary: Negative for dysuria, enuresis, frequency and urgency  Musculoskeletal: Negative for arthralgias and myalgias  Skin: Negative for pallor, rash and wound  Allergic/Immunologic: Negative  Neurological: Positive for numbness  Negative for dizziness, tremors and weakness  Hematological: Negative  Psychiatric/Behavioral: Negative          Historical Information   Past Medical History:   Diagnosis Date    Lyme disease     treated 6 years ago - 1+ month medication     Past Surgical History:   Procedure Laterality Date    CHOLECYSTECTOMY       Social History   Social History     Substance and Sexual Activity   Alcohol Use Never    Frequency: Never     Social History     Substance and Sexual Activity   Drug Use Never     Social History     Tobacco Use   Smoking Status Former Smoker    Packs/day: 1 00    Years: 18 00    Pack years: 18 00    Types: Cigarettes    Last attempt to quit: 3/25/2009    Years since quitting: 10 0   Smokeless Tobacco Never Used     Family History:   Family History   Problem Relation Age of Onset    Deep vein thrombosis Mother     Hypertension Mother     COPD Father     Diabetes Father     Hypertension Sister     Heart failure Maternal Grandmother  age 71    Heart disease Maternal Grandfather     Heart disease Paternal Grandfather        Meds/Allergies   Current Facility-Administered Medications   Medication Dose Route Frequency Provider Last Rate Last Dose    acetaminophen (TYLENOL) tablet 650 mg  650 mg Oral Q4H PRN Ken Aquas, DO        aluminum-magnesium hydroxide-simethicone (MYLANTA) 200-200-20 mg/5 mL oral suspension 30 mL  30 mL Oral Q6H PRN Ken Aquas, DO        aspirin chewable tablet 81 mg  81 mg Oral Daily Ken Aquas, DO   81 mg at 19 3591    enoxaparin (LOVENOX) subcutaneous injection 40 mg  40 mg Subcutaneous Daily Ken Aquas, DO   40 mg at 19 8435    gabapentin (NEURONTIN) capsule 100 mg  100 mg Oral TID Jason Nettles MD        hydrALAZINE (APRESOLINE) injection 10 mg  10 mg Intravenous Q6H PRN Ken Aquas, DO        ibuprofen (MOTRIN) tablet 600 mg  600 mg Oral Q6H PRN Ken Aquas, DO        insulin lispro (HumaLOG) 100 units/mL subcutaneous injection 1-5 Units  1-5 Units Subcutaneous HS Ken Aquas, DO   2 Units at 19 2236    insulin lispro (HumaLOG) 100 units/mL subcutaneous injection 1-6 Units  1-6 Units Subcutaneous TID AC Jason Nettles MD        morphine (PF) 4 mg/mL injection 4 mg  4 mg Intravenous Q4H PRN Ken Aquas, DO        nitroglycerin (NITROSTAT) SL tablet 0 4 mg  0 4 mg Sublingual Q5 Min PRN Ken Aquas, DO        ondansetron [facility]) injection 4 mg  4 mg Intravenous Q6H PRN Ken Aquas, DO        pravastatin (PRAVACHOL) tablet 40 mg  40 mg Oral Daily With Vasile Pleitez MD        traMADol Beau Lies) tablet 50 mg  50 mg Oral Q6H PRN Ken Aquas, DO         No Known Allergies    Objective   Vitals: Blood pressure 147/74, pulse 78, temperature 98 4 °F (36 9 °C), temperature source Oral, resp  rate 17, height 5' 2" (1 575 m), weight 75 6 kg (166 lb 10 7 oz), SpO2 94 %      Intake/Output Summary (Last 24 hours) at 3/26/2019 1718  Last data filed at 3/26/2019 0700  Gross per 24 hour   Intake 0 ml   Output --   Net 0 ml     Invasive Devices     Peripheral Intravenous Line            Peripheral IV 03/25/19 Right Antecubital 1 day                Physical Exam   Constitutional: She is oriented to person, place, and time  She appears well-developed and well-nourished  No distress  HENT:   Head: Normocephalic and atraumatic  Eyes: Conjunctivae and EOM are normal  Right eye exhibits no discharge  Left eye exhibits no discharge  Neck: Normal range of motion  Neck supple  No thyromegaly present  Cardiovascular: Normal rate, regular rhythm and normal heart sounds  No murmur heard  Pulmonary/Chest: Effort normal and breath sounds normal  No respiratory distress  She has no wheezes  Abdominal: Soft  Bowel sounds are normal  She exhibits no distension  There is no tenderness  Musculoskeletal: Normal range of motion  She exhibits no edema, tenderness or deformity  Neurological: She is alert and oriented to person, place, and time  She has normal reflexes  She displays normal reflexes  Skin: Skin is warm and dry  No rash noted  She is not diaphoretic  No erythema  Psychiatric: She has a normal mood and affect  Her behavior is normal    Vitals reviewed  The history was obtained from the review of the chart, patient      Lab Results:   Results from last 7 days   Lab Units 03/26/19  0551   HEMOGLOBIN A1C % 9 0*     Lab Results   Component Value Date    WBC 7 61 03/26/2019    HGB 14 2 03/26/2019    HCT 40 2 03/26/2019    MCV 88 03/26/2019     03/26/2019     Lab Results   Component Value Date/Time    BUN 10 03/26/2019 05:51 AM    K 3 8 03/26/2019 05:51 AM     03/26/2019 05:51 AM    CO2 28 03/26/2019 05:51 AM    CREATININE 0 60 03/26/2019 05:51 AM    AST 17 03/25/2019 02:53 PM    ALT 39 03/25/2019 02:53 PM    ALB 4 0 03/25/2019 02:53 PM     Recent Labs     03/26/19  0551   HDL 40   TRIG 432*     No results found for: Annalisa Durán, [id number]  POC Glucose (mg/dl)   Date Value   03/26/2019 241 (H)   03/26/2019 234 (H)   03/26/2019 188 (H)   03/25/2019 236 (H)       Imaging Studies: I have personally reviewed pertinent reports  Portions of the record may have been created with voice recognition software

## 2019-03-26 NOTE — ASSESSMENT & PLAN NOTE
- HbA1c of 9 0   - complains of worsening polydipsia and polyuria with intermittent blurry vision over the last few weeks  - on SSI coverage per [employer]   - will appreciate endocrinology input to establish care - will need diabetic teaching prior to discharge  - also complaining of intermittent lower extremity tingling/numbness - consider neuropathy hence will initiate trial of Gabapentin

## 2019-03-26 NOTE — PROGRESS NOTES
[facility]ist Service - Internal Medicine Progress Note       PATIENT INFORMATION      Patient: Viraj De La Torre 46 y o  female   MRN: [id number]  PCP: Delmar Porter MD  Unit/Bed#: MS [facility] Encounter: [id number]  Date Of Visit: 19       ASSESSMENTS & PLAN     Chest pain  Assessment & Plan  - serial troponins negative  - await echocardiogram  - in light of intermittent recurrence and new diagnosis of hyperlipidemia/diabetes mellitus, plan for stress test tomorrow  - PRN SL NTG on board   - continue ASA daily - maintain oxygenation    New onset diabetes mellitus  Assessment & Plan  - HbA1c of 9 0   - complains of worsening polydipsia and polyuria with intermittent blurry vision over the last few weeks  - on SSI coverage per [employer]   - will appreciate endocrinology input to establish care - will need diabetic teaching prior to discharge  - also complaining of intermittent lower extremity tingling/numbness - consider neuropathy hence will initiate trial of Gabapentin    Hyperlipidemia  Assessment & Plan  - fasting LDL of 166  - continue ASA - initiate statin  - lifestyle/diet modifications      VTE Prophylaxis:  Lovenox      SUBJECTIVE     Seen/examined earlier today with family present  States her chest discomfort on admission has improved but she still has intermittent stabbing sensations  She has been counseled on new onset diabetes mellitus and acknowledges that she has had progressive polydipsia and polyuria over the last few weeks  No other complaints at this time  OBJECTIVE     Vitals:   Temp (24hrs), Av 9 °F (36 6 °C), Min:97 6 °F (36 4 °C), Max:98 °F (36 7 °C)    Temp:  [97 6 °F (36 4 °C)-98 °F (36 7 °C)] 98 °F (36 7 °C)  HR:  [69-76] 76  Resp:  [16-20] 16  BP: (102-158)/(50-69) 118/59  SpO2:  [95 %-98 %] 97 %  Body mass index is 30 48 kg/m²  Input and Output Summary (last 24 hours):        Intake/Output Summary (Last 24 hours) at 3/26/2019 1328  Last data filed at 3/26/2019 0700  Gross per 24 hour   Intake 0 ml   Output --   Net 0 ml       Physical Exam:     GENERAL:  Well-developed/nourished - no immediate distress  HEAD:  Normocephalic - atraumatic  EYES: PERRL - EOMI   MOUTH:  Mucosa moist  NECK:  Supple - full range of motion  CARDIAC:  Regular rate/rhythm - S1/S2 positive  PULMONARY:  Clear breath sounds bilaterally - nonlabored respirations  ABDOMEN:  Soft - nontender/nondistended - active bowel sounds  MUSCULOSKELETAL:  Motor strength/range of motion intact  NEUROLOGIC:  Alert/oriented x 3  SKIN:  Age-appropriate wrinkles/blemishes   PSYCHIATRIC:  Mood/affect somewhat anxious      ADDITIONAL DATA       Labs & Recent Cultures:     Results from last 7 days   Lab Units 03/26/19  0552 03/25/19  1453   WBC Thousand/uL 7 61 8 55   HEMOGLOBIN g/dL 14 2 14 8   HEMATOCRIT % 40 2 41 6   PLATELETS Thousands/uL 248 288   NEUTROS PCT %  --  55   LYMPHS PCT %  --  35   MONOS PCT %  --  7   EOS PCT %  --  3     Results from last 7 days   Lab Units 03/26/19  0551 03/25/19  1453   SODIUM mmol/L 140 136   POTASSIUM mmol/L 3 8 3 8   CHLORIDE mmol/L 103 99*   CO2 mmol/L 28 27   BUN mg/dL 10 11   CREATININE mg/dL 0 60 0 64   CALCIUM mg/dL 9 0 9 4   ALK PHOS U/L  --  131*   ALT U/L  --  39   AST U/L  --  17                     Last 24 Hours Medication List:     Current Facility-Administered Medications:  acetaminophen 650 mg Oral Q4H PRN Page Mass, DO   aluminum-magnesium hydroxide-simethicone 30 mL Oral Q6H PRN Page Mass, DO   aspirin 81 mg Oral Daily Page Mass, DO   enoxaparin 40 mg Subcutaneous Daily Page Mass, DO   gabapentin 100 mg Oral TID Trey Hall MD   hydrALAZINE 10 mg Intravenous Q6H PRN Page Mass, DO   ibuprofen 600 mg Oral Q6H PRN Page Mass, DO   insulin lispro 1-5 Units Subcutaneous HS Page Mass, DO   insulin lispro 1-6 Units Subcutaneous TID AC Trey Hall MD   morphine injection 4 mg Intravenous Q4H PRN Page Mass, DO   nitroglycerin 0 4 mg Sublingual Q5 Min PRN Reola Méndez, DO   ondansetron 4 mg Intravenous Q6H PRN Reola Méndez, DO   pravastatin 40 mg Oral Daily With Macarena Gonzalez MD   traMADol 50 mg Oral Q6H PRN Reola Méndez, DO          Time Spent for Care: 32 minutes  More than 50% of total time spent on counseling and coordination of care as described above  Current Length of Stay: 0 day(s)      Code Status: Level 1 - Full Code        ** Please Note: This note is constructed using a voice recognition dictation system  An occasional wrong word/phrase or sound-a-like substitution may have been picked up by dictation device due to the inherent limitations of voice recognition software  Read the chart carefully and recognize, using reasonable context, where substitutions may have occurred  **

## 2019-03-27 ENCOUNTER — APPOINTMENT (OUTPATIENT)
Dept: NUCLEAR MEDICINE | Facility: HOSPITAL | Age: 49
End: 2019-03-27
Payer: COMMERCIAL

## 2019-03-27 ENCOUNTER — APPOINTMENT (OUTPATIENT)
Dept: NON INVASIVE DIAGNOSTICS | Facility: HOSPITAL | Age: 49
End: 2019-03-27
Payer: COMMERCIAL

## 2019-03-27 VITALS
DIASTOLIC BLOOD PRESSURE: 58 MMHG | SYSTOLIC BLOOD PRESSURE: 127 MMHG | OXYGEN SATURATION: 97 % | HEART RATE: 77 BPM | HEIGHT: 62 IN | RESPIRATION RATE: 16 BRPM | TEMPERATURE: 97.7 F | WEIGHT: 166.67 LBS | BODY MASS INDEX: 30.67 KG/M2

## 2019-03-27 LAB
GLUCOSE SERPL-MCNC: 193 MG/DL (ref 65–140)
GLUCOSE SERPL-MCNC: 194 MG/DL (ref 65–140)

## 2019-03-27 PROCEDURE — 82948 REAGENT STRIP/BLOOD GLUCOSE: CPT

## 2019-03-27 PROCEDURE — 93017 CV STRESS TEST TRACING ONLY: CPT

## 2019-03-27 PROCEDURE — 93018 CV STRESS TEST I&R ONLY: CPT | Performed by: INTERNAL MEDICINE

## 2019-03-27 PROCEDURE — 78452 HT MUSCLE IMAGE SPECT MULT: CPT

## 2019-03-27 PROCEDURE — 99217 PR OBSERVATION CARE DISCHARGE MANAGEMENT: CPT | Performed by: INTERNAL MEDICINE

## 2019-03-27 PROCEDURE — 93016 CV STRESS TEST SUPVJ ONLY: CPT | Performed by: INTERNAL MEDICINE

## 2019-03-27 PROCEDURE — 78452 HT MUSCLE IMAGE SPECT MULT: CPT | Performed by: INTERNAL MEDICINE

## 2019-03-27 PROCEDURE — A9502 TC99M TETROFOSMIN: HCPCS

## 2019-03-27 RX ORDER — FENOFIBRATE 145 MG/1
145 TABLET, COATED ORAL DAILY
Qty: 30 TABLET | Refills: 0 | Status: SHIPPED | OUTPATIENT
Start: 2019-03-28 | End: 2019-07-17 | Stop reason: DRUGHIGH

## 2019-03-27 RX ORDER — FENOFIBRATE 145 MG/1
145 TABLET, COATED ORAL DAILY
Status: DISCONTINUED | OUTPATIENT
Start: 2019-03-27 | End: 2019-03-27 | Stop reason: HOSPADM

## 2019-03-27 RX ORDER — ASPIRIN 81 MG/1
81 TABLET, CHEWABLE ORAL DAILY
Qty: 30 TABLET | Refills: 0 | Status: SHIPPED | OUTPATIENT
Start: 2019-03-28

## 2019-03-27 RX ORDER — INSULIN GLARGINE 100 [IU]/ML
15 INJECTION, SOLUTION SUBCUTANEOUS
Qty: 10 ML | Refills: 0 | Status: SHIPPED | OUTPATIENT
Start: 2019-03-27 | End: 2019-07-17 | Stop reason: DRUGHIGH

## 2019-03-27 RX ORDER — PRAVASTATIN SODIUM 40 MG
40 TABLET ORAL
Qty: 30 TABLET | Refills: 0 | Status: SHIPPED | OUTPATIENT
Start: 2019-03-27

## 2019-03-27 RX ORDER — GABAPENTIN 100 MG/1
100 CAPSULE ORAL 3 TIMES DAILY
Qty: 90 CAPSULE | Refills: 0 | Status: SHIPPED | OUTPATIENT
Start: 2019-03-27

## 2019-03-27 RX ORDER — INSULIN GLARGINE 100 [IU]/ML
15 INJECTION, SOLUTION SUBCUTANEOUS
Status: DISCONTINUED | OUTPATIENT
Start: 2019-03-27 | End: 2019-03-27 | Stop reason: HOSPADM

## 2019-03-27 RX ADMIN — INSULIN LISPRO 3 UNITS: 100 INJECTION, SOLUTION INTRAVENOUS; SUBCUTANEOUS at 13:21

## 2019-03-27 RX ADMIN — REGADENOSON 0.4 MG: 0.08 INJECTION, SOLUTION INTRAVENOUS at 09:34

## 2019-03-27 RX ADMIN — FENOFIBRATE 145 MG: 145 TABLET, COATED ORAL at 12:53

## 2019-03-27 RX ADMIN — ASPIRIN 81 MG 81 MG: 81 TABLET ORAL at 10:34

## 2019-03-27 RX ADMIN — GABAPENTIN 100 MG: 100 CAPSULE ORAL at 10:34

## 2019-03-27 RX ADMIN — ENOXAPARIN SODIUM 40 MG: 40 INJECTION SUBCUTANEOUS at 10:35

## 2019-03-27 RX ADMIN — INSULIN LISPRO 3 UNITS: 100 INJECTION, SOLUTION INTRAVENOUS; SUBCUTANEOUS at 10:35

## 2019-03-27 NOTE — UTILIZATION REVIEW
Continued Stay Review    Date: 3/27/2019    Vital Signs: /58 (BP Location: Right arm)   Pulse 77   Temp 97 7 °F (36 5 °C) (Oral)   Resp 16   Ht 5' 2" (1 575 m)   Wt 75 6 kg (166 lb 10 7 oz)   SpO2 97%   BMI 30 48 kg/m²      Assessment/Plan: Intermittent recurrence of chest pain- stress test today; New onset DM type 2-Diabetic teaching & appreciate Endocrine input  Endocrine with Humalog prior to meals with Lantus at bedtime  Triclycerides at 432-total cholesterol elevated-started on Pravastin daily & will add fish oil BID at DC  Medications:   Scheduled Meds:   Current Facility-Administered Medications:  acetaminophen 650 mg Oral Q4H PRN Ken Aquas, DO   aluminum-magnesium hydroxide-simethicone 30 mL Oral Q6H PRN Ken Aquas, DO   aspirin 81 mg Oral Daily Ken Aquas, DO   enoxaparin 40 mg Subcutaneous Daily Ken Aquas, DO   gabapentin 100 mg Oral TID Jason Nettles MD   hydrALAZINE 10 mg Intravenous Q6H PRN Ken Aquas, DO   ibuprofen 600 mg Oral Q6H PRN Ken Aquas, DO   insulin glargine 15 Units Subcutaneous HS Adalberto Francis MD   insulin lispro 1-5 Units Subcutaneous HS Ken Aquas, DO   insulin lispro 1-6 Units Subcutaneous TID AC Jason Nettles MD   insulin lispro 3 Units Subcutaneous TID With Meals Adalberto Francis MD   morphine injection 4 mg Intravenous Q4H PRN Ken Aquas, DO   nitroglycerin 0 4 mg Sublingual Q5 Min PRN Ken Aquas, DO   ondansetron 4 mg Intravenous Q6H PRN Ken Aquas, DO   pravastatin 40 mg Oral Daily With Vasilepricilla Pleitez MD   traMADol 50 mg Oral Q6H PRN Ken Aquas, DO     Pertinent Labs/Diagnostic Results: 3/27glucose 193  Age/Sex: 46 y o  female   Discharge Plan: tbd    Network Utilization Review Department  Phone: [phone]; Fax [phone]  [email or web address]  ATTENTION: Please call with any questions or concerns to [phone]  and carefully listen to the prompts so that you are directed to the right person  Send all requests for admission clinical reviews, approved or denied determinations and any other requests to fax [phone]   All voicemails are confidential

## 2019-03-27 NOTE — PLAN OF CARE
Problem: CARDIOVASCULAR - ADULT  Goal: Maintains optimal cardiac output and hemodynamic stability  Description  INTERVENTIONS:  - Monitor I/O, vital signs and rhythm  - Monitor for S/S and trends of decreased cardiac output i e  bleeding, hypotension  - Administer and titrate ordered vasoactive medications to optimize hemodynamic stability  - Assess quality of pulses, skin color and temperature  - Assess for signs of decreased coronary artery perfusion - ex  Angina  - Instruct patient to report change in severity of symptoms  Outcome: Progressing  Goal: Absence of cardiac dysrhythmias or at baseline rhythm  Description  INTERVENTIONS:  - Continuous cardiac monitoring, monitor vital signs, obtain 12 lead EKG if indicated  - Administer antiarrhythmic and heart rate control medications as ordered  - Monitor electrolytes and administer replacement therapy as ordered  Outcome: Progressing     Problem: DISCHARGE PLANNING  Goal: Discharge to home or other facility with appropriate resources  Description  INTERVENTIONS:  - Identify barriers to discharge w/patient and caregiver  - Arrange for needed discharge resources and transportation as appropriate  - Identify discharge learning needs (meds, wound care, etc )  - Arrange for interpretive services to assist at discharge as needed  - Refer to Case Management Department for coordinating discharge planning if the patient needs post-hospital services based on physician/advanced practitioner order or complex needs related to functional status, cognitive ability, or social support system  Outcome: Progressing     Problem: Knowledge Deficit  Goal: Patient/family/caregiver demonstrates understanding of disease process, treatment plan, medications, and discharge instructions  Description  Complete learning assessment and assess knowledge base    Interventions:  - Provide teaching at level of understanding  - Provide teaching via preferred learning methods  Outcome: Progressing Problem: PAIN - ADULT  Goal: Verbalizes/displays adequate comfort level or baseline comfort level  Description  Interventions:  - Encourage patient to monitor pain and request assistance  - Assess pain using appropriate pain scale  - Administer analgesics based on type and severity of pain and evaluate response  - Implement non-pharmacological measures as appropriate and evaluate response  - Consider cultural and social influences on pain and pain management  - Notify physician/advanced practitioner if interventions unsuccessful or patient reports new pain  Outcome: Progressing     Problem: METABOLIC, FLUID AND ELECTROLYTES - ADULT  Goal: Electrolytes maintained within normal limits  Description  INTERVENTIONS:  - Monitor labs and assess patient for signs and symptoms of electrolyte imbalances  - Administer electrolyte replacement as ordered  - Monitor response to electrolyte replacements, including repeat lab results as appropriate  - Instruct patient on fluid and nutrition as appropriate  Outcome: Progressing  Goal: Glucose maintained within target range  Description  INTERVENTIONS:  - Monitor Blood Glucose as ordered  - Assess for signs and symptoms of hyperglycemia and hypoglycemia  - Administer ordered medications to maintain glucose within target range  - Assess nutritional intake and initiate nutrition service referral as needed  Outcome: Progressing

## 2019-03-27 NOTE — PLAN OF CARE
Problem: DISCHARGE PLANNING - CARE MANAGEMENT  Goal: Discharge to post-acute care or home with appropriate resources  Description  INTERVENTIONS:  - Conduct assessment to determine patient/family and health care team treatment goals, and need for post-acute services based on payer coverage, community resources, and patient preferences, and barriers to discharge  - Address psychosocial, clinical, and financial barriers to discharge as identified in assessment in conjunction with the patient/family and health care team  - Arrange appropriate level of post-acute services according to patient?s   needs and preference and payer coverage in collaboration with the physician and health care team  - Communicate with and update the patient/family, physician, and health care team regarding progress on the discharge plan  - Arrange appropriate transportation to post-acute venues  Outcome: Completed  Note:   NOT A BUNDLE;  NOT A READMISSION  Pt will be a new start insulin for DM  Scripts have been received to send to pt's [facility] pharmacy on [address] at [facility] contacted cm stating pt's insulin and items will be covered and she will have a $20 co pay  Cm received this with pt who stated she is able to afford this  No further cm interventions needed at this time

## 2019-03-27 NOTE — DISCHARGE SUMMARY
Discharge Summary - [facility] - Internal Medicine      Patient Information: Antony Ralph 46 y o  female MRN: [id number]  Unit/Bed#: -01 Encounter: [id number]    Discharging Physician / Practitioner: Rufus Elizabeth MD  PCP: Merry Ellison MD  Admission Date:   Admission Orders (From admission, onward)    Ordered        03/25/19 1655  Place in Observation (expected length of stay for this patient is less than two midnights)  Once     Order ID Start Status   [id number] 03/25/19 1656 Completed              Discharge Date: 03/27/19      Reason for Admission:  Chest discomfort      Discharge Diagnoses:     Principal Problem:    Atypical chest pain    Active Problems:    New onset diabetes mellitus    Hypercholesterolemia - Hypertriglyceridemia      Consultations During Hospital Stay:  · Endocrinology      Hospital Course:     Atypical chest pain  Assessment & Plan  - serial troponins negative  - echocardiogram revealed a normal EF of 65% with trace MR but no evidence of LV wall motion abnormalities per reading cardiologist - myocardial stress test was negative for acute ischemia     New onset diabetes mellitus  Assessment & Plan  - HbA1c of 9 0   - complains of worsening polydipsia and polyuria with intermittent blurry vision over the last few weeks  - on SSI coverage per [employer] during inpatient course - appreciate endocrinology input and regimen on discharge will include Lantus 15 units QHS along with initiation of Metformin 500 mg BID  - diabetic teaching with nutritionist prior to discharge - scripts also provided for glucometer, test strips, lancets, and insulin needles  - also complaining of intermittent lower extremity tingling/numbness - consider neuropathy hence trialed on low-dose Gabapentin with mild improvement (prescription provided)   - outpatient follow-up with endocrinology and PCP      Hyperlipidemia  Assessment & Plan  - fasting LDL unreliable due to elevated triglyceridemia (432) - initiated on a statin and Tricor - heavily counseled on lifestyle/diet modifications including exercise  - continue ASA      Condition at Discharge:   Good      Discharge Day Visit / Exam:     Vitals: Blood Pressure: 127/58 (03/27/19 0746)  Pulse: 77 (03/27/19 0746)  Temperature: 97 7 °F (36 5 °C) (03/27/19 0746)  Temp Source: Oral (03/27/19 0746)  Respirations: 16 (03/27/19 0746)  Height: 5' 2" (157 5 cm) (03/27/19 1200)  Weight - Scale: 75 6 kg (166 lb 10 7 oz) (03/25/19 1845)  SpO2: 97 % (03/27/19 0746)      Physical exam - I had a face-to-face encounter with the patient on day of discharge  Discussion with Patient and/or Family:  The patient has been advised to return to the ER immediately if any symptoms recur or worsen  Discharge instructions/Information to Patient and/or Family:   See after visit summary for information provided to patient and/or family  Provisions for Follow-Up Care:  See after visit summary for information related to follow-up care and any pertinent home health orders  Disposition:   Home      Discharge Medications:  See after visit summary for reconciled discharge medications provided to patient and/or family  Discharge Statement:  I spent 38 minutes discharging the patient  This time was spent on the day of discharge  I had direct contact with the patient on the day of discharge  Greater than 50% of the total time was spent examining patient, answering all patient questions, arranging and discussing plan of care with patient as well as directly providing post-discharge instructions  Additional time then spent on discharge activities  ** Please Note: This note is constructed using a voice recognition dictation system  An occasional wrong word/phrase or sound-a-like substitution may have been picked up by dictation device due to the inherent limitations of voice recognition software    Read the chart carefully and recognize, using reasonable context, where substitutions may have occurred  **

## 2019-03-27 NOTE — SOCIAL WORK
NOT A BUNDLE;  NOT A READMISSION  Pt will be a new start insulin for DM  Scripts have been received to send to pt's [facility] pharmacy on [address] at [facility] contacted cm stating pt's insulin and items will be covered and she will have a $20 co pay  Cm received this with pt who stated she is able to afford this  No further cm interventions needed at this time

## 2019-03-29 LAB
CHEST PAIN STATEMENT: NORMAL
MAX DIASTOLIC BP: 70 MMHG
MAX HEART RATE: 116 BPM
MAX PREDICTED HEART RATE: 170 BPM
MAX. SYSTOLIC BP: 160 MMHG
PROTOCOL NAME: NORMAL
REASON FOR TERMINATION: NORMAL
TARGET HR FORMULA: NORMAL
TEST INDICATION: NORMAL
TIME IN EXERCISE PHASE: NORMAL

## 2019-04-10 ENCOUNTER — OFFICE VISIT (OUTPATIENT)
Dept: ENDOCRINOLOGY | Facility: CLINIC | Age: 49
End: 2019-04-10
Payer: COMMERCIAL

## 2019-04-10 VITALS
DIASTOLIC BLOOD PRESSURE: 74 MMHG | BODY MASS INDEX: 29.26 KG/M2 | SYSTOLIC BLOOD PRESSURE: 130 MMHG | WEIGHT: 159 LBS | HEIGHT: 62 IN

## 2019-04-10 DIAGNOSIS — E11.65 TYPE 2 DIABETES MELLITUS WITH HYPERGLYCEMIA, WITH LONG-TERM CURRENT USE OF INSULIN (HCC): Primary | ICD-10-CM

## 2019-04-10 DIAGNOSIS — Z79.4 TYPE 2 DIABETES MELLITUS WITH HYPERGLYCEMIA, WITH LONG-TERM CURRENT USE OF INSULIN (HCC): Primary | ICD-10-CM

## 2019-04-10 DIAGNOSIS — E55.9 VITAMIN D DEFICIENCY: ICD-10-CM

## 2019-04-10 DIAGNOSIS — E78.2 MIXED HYPERLIPIDEMIA: ICD-10-CM

## 2019-04-10 PROCEDURE — 99214 OFFICE O/P EST MOD 30 MIN: CPT | Performed by: PHYSICIAN ASSISTANT

## 2019-07-08 ENCOUNTER — TELEPHONE (OUTPATIENT)
Dept: ENDOCRINOLOGY | Facility: CLINIC | Age: 49
End: 2019-07-08

## 2019-07-17 ENCOUNTER — OFFICE VISIT (OUTPATIENT)
Dept: ENDOCRINOLOGY | Facility: CLINIC | Age: 49
End: 2019-07-17
Payer: COMMERCIAL

## 2019-07-17 VITALS
SYSTOLIC BLOOD PRESSURE: 130 MMHG | WEIGHT: 141 LBS | BODY MASS INDEX: 25.95 KG/M2 | HEART RATE: 63 BPM | DIASTOLIC BLOOD PRESSURE: 82 MMHG | HEIGHT: 62 IN

## 2019-07-17 DIAGNOSIS — E11.40 CONTROLLED TYPE 2 DIABETES WITH NEUROPATHY (HCC): ICD-10-CM

## 2019-07-17 DIAGNOSIS — E78.2 MIXED HYPERLIPIDEMIA: ICD-10-CM

## 2019-07-17 DIAGNOSIS — E11.65 TYPE 2 DIABETES MELLITUS WITH HYPERGLYCEMIA, WITHOUT LONG-TERM CURRENT USE OF INSULIN (HCC): Primary | ICD-10-CM

## 2019-07-17 DIAGNOSIS — E55.9 VITAMIN D DEFICIENCY: ICD-10-CM

## 2019-07-17 PROCEDURE — 99214 OFFICE O/P EST MOD 30 MIN: CPT | Performed by: INTERNAL MEDICINE

## 2019-07-17 RX ORDER — FENOFIBRATE 54 MG/1
54 TABLET ORAL DAILY
Qty: 90 TABLET | Refills: 1 | Status: SHIPPED | OUTPATIENT
Start: 2019-07-17

## 2019-07-17 RX ORDER — EMPAGLIFLOZIN, METFORMIN HYDROCHLORIDE 25; 1000 MG/1; MG/1
15 TABLET, EXTENDED RELEASE ORAL
COMMUNITY
Start: 2019-07-11

## 2019-07-17 NOTE — PROGRESS NOTES
Patient Progress Note      CC: DM2, hospital follow-up     Referring Provider  Marylen Ket, [address]  [address] [facility], [address] [place] [address]     History of Present Illness:   Griselda Mower is a 46 y o  female with a history of type 2 diabetes , newly diagnosed in March 2019, with A1c of 9%  Was started on Lantus 15 units at bedtime  Currently taking SynJardy 25/1000 mg po daily   Denies side effects  Sees checking blood sugar twice daily  Blood sugars are in  range      Last a1c - 6 3 % from July 8170  Complications - she has neuropathy and managed by PCP, on gabapentin 100 mg three times a day     She saw her primary care physician recently, Lantus was discontinued as she was having hypoglycemia  And she was started on synJardy   Lab Results   Component Value Date    HGBA1C 9 0 (H) 03/26/2019     She also has lost weight, and has done lot of dietary modifications  She is keeping her carbohydrate consistent with meals, avoiding sweets and free sugars  Medic alert tag: recommended: Yes  She declined diabetes Education follow-up which was offered today  Ophthamology:   Up todate, no retinopathy  Podiatry: does not see podiatry, feet exam was done in office    Has hyperlipidemia: recently started on pravastatin and fenofibrate - tolerating well, no myalgias  compliant most of the time, denies any side effects from medications     Her most recent lipid profile improved    Lipid profile, reviewed from July 2019  Cholesterol 154, triglycerides 85 HDL 55 LDL 82  In March her triglycerides were 432    Patient Active Problem List   Diagnosis    Chest pain    Hyperglycemia    Elevated blood pressure reading    Hyperlipidemia    New onset diabetes mellitus    Vitamin D deficiency      Past Medical History:   Diagnosis Date    Lyme disease     treated 6 years ago - 1+ month medication      Past Surgical History:   Procedure Laterality Date    CHOLECYSTECTOMY        Family History   Problem Relation Age of Onset  Deep vein thrombosis Mother     Hypertension Mother     COPD Father     Diabetes Father     Hypertension Sister     Heart failure Maternal Grandmother          age 71    Heart disease Maternal Grandfather     Heart disease Paternal Grandfather      Social History     Tobacco Use    Smoking status: Former Smoker     Packs/day: 1 00     Years: 18      Pack years: 18      Types: Cigarettes     Last attempt to quit: 3/25/2009     Years since quitting: 10 3    Smokeless tobacco: Never Used   Substance Use Topics    Alcohol use: Never     Frequency: Never     Allergies   Allergen Reactions    Semaglutide Rash         Current Outpatient Medications:     aspirin 81 mg chewable tablet, Chew 1 tablet (81 mg total) daily, Disp: 30 tablet, Rfl: 0    gabapentin (NEURONTIN) 100 mg capsule, Take 1 capsule (100 mg total) by mouth 3 (three) times a day, Disp: 90 capsule, Rfl: 0    pravastatin (PRAVACHOL) 40 mg tablet, Take 1 tablet (40 mg total) by mouth daily with dinner, Disp: 30 tablet, Rfl: 0    fenofibrate (TRICOR) 54 MG tablet, Take 1 tablet (54 mg total) by mouth daily, Disp: 90 tablet, Rfl: 1    SYNJARDY XR  MG TB24, Take 15 mg by mouth daily in the early morning, Disp: , Rfl:   Review of Systems   Constitutional: Positive for unexpected weight change (Weight loss)  Negative for activity change, appetite change and fatigue  HENT: Negative for trouble swallowing  Eyes: Negative for visual disturbance  Respiratory: Negative for shortness of breath  Cardiovascular: Negative for chest pain and palpitations  Gastrointestinal: Negative for constipation and diarrhea  Endocrine: Negative for polydipsia and polyuria  Musculoskeletal: Negative for arthralgias  Skin: Negative for wound  Neurological: Negative for numbness  Psychiatric/Behavioral: Negative for dysphoric mood  Physical Exam:  Body mass index is 25 79 kg/m²    /82   Pulse 63   Ht 5' 2" (1 575 m)   Wt 64 kg (141 lb)   BMI 25 79 kg/m²    Wt Readings from Last 3 Encounters:   07/17/19 64 kg (141 lb)   04/10/19 72 1 kg (159 lb)   03/25/19 75 6 kg (166 lb 10 7 oz)       Physical Exam   Constitutional: She is oriented to person, place, and time  She appears well-developed and well-nourished  HENT:   Head: Normocephalic  Eyes: Pupils are equal, round, and reactive to light  EOM are normal  No scleral icterus  Neck: Neck supple  No thyromegaly present  Cardiovascular: Normal rate and regular rhythm  Pulses are no weak pulses  No murmur heard  Pulses:       Radial pulses are 2+ on the right side, and 2+ on the left side  Dorsalis pedis pulses are 2+ on the right side, and 2+ on the left side  Posterior tibial pulses are 2+ on the right side, and 2+ on the left side  Pulmonary/Chest: Effort normal and breath sounds normal    Musculoskeletal: Normal range of motion  She exhibits no edema, tenderness or deformity  Feet:   Right Foot:   Skin Integrity: Negative for ulcer, skin breakdown, erythema, warmth, callus or dry skin  Left Foot:   Skin Integrity: Negative for ulcer, skin breakdown, erythema, warmth, callus or dry skin  Neurological: She is alert and oriented to person, place, and time  She has normal reflexes  She displays normal reflexes  Skin: Skin is warm and dry  Psychiatric: She has a normal mood and affect  Nursing note and vitals reviewed  Patient's shoes and socks removed  Right Foot/Ankle   Right Foot Inspection  Skin Exam: skin normal and skin intact no dry skin, no warmth, no callus, no erythema, no maceration, no abnormal color, no pre-ulcer, no ulcer and no callus                          Toe Exam: ROM and strength within normal limits  Sensory   Vibration: intact    Monofilament testing: intact  Vascular    The right DP pulse is 2+  The right PT pulse is 2+       Left Foot/Ankle  Left Foot Inspection  Skin Exam: skin normal and skin intactno dry skin, no warmth, no erythema, no maceration, normal color, no pre-ulcer, no ulcer and no callus                         Toe Exam: ROM and strength within normal limits                   Sensory   Vibration: intact    Monofilament: intact  Vascular    The left DP pulse is 2+  The left PT pulse is 2+  Assign Risk Category:  No deformity present; No loss of protective sensation; No weak pulses       Risk: 0      Labs:    Ref  Range 3/26/2019 05:51   Sodium Latest Ref Range: 136 - 145 mmol/L 140   Potassium Latest Ref Range: 3 5 - 5 3 mmol/L 3 8   Chloride Latest Ref Range: 100 - 108 mmol/L 103   CO2 Latest Ref Range: 21 - 32 mmol/L 28   Anion Gap Latest Ref Range: 4 - 13 mmol/L 9   BUN Latest Ref Range: 5 - 25 mg/dL 10   Creatinine Latest Ref Range: 0 60 - 1 30 mg/dL 0 60   Glucose, Random Latest Ref Range: 65 - 140 mg/dL 173 (H)   GLUCOSE FASTING Latest Ref Range: 65 - 99 mg/dL 173 (H)   Calcium Latest Ref Range: 8 3 - 10 1 mg/dL 9 0   eGFR Latest Units: ml/min/1 73sq m 107   Cholesterol Latest Ref Range: 50 - 200 mg/dL 206 (H)   Triglycerides Latest Ref Range: <=150 mg/dL 432 (H)   HDL Latest Ref Range: 40 - 60 mg/dL 40   Non-HDL Cholesterol Latest Units: mg/dl 166   LDL Direct Latest Ref Range: 0 - 100 mg/dL See Comment      Ref  Range 3/26/2019 05:51   Hemoglobin A1C Latest Ref Range: 4 2 - 6 3 % 9 0 (H)       Plan:    Diagnoses and all orders for this visit:    Diagnoses and all orders for this visit:    Type 2 diabetes mellitus with hyperglycemia, without long-term current use of insulin ([facility] )    Lab Results   Component Value Date    HGBA1C 9 0 (H) 03/26/2019    A1c improved to 6 3%, blood sugars are in optimal range in  consistently  Discussed to continue current management  Discussed to check blood sugar once or twice daily, call if blood sugar less than 70 or more than 300  Keep carbohydrate consistent with meals  Offered to see diabetes educator and dietitian, patient declined    Will follow-up in 4 months  If her diabetes is stable, she prefers to continue to follow with her primary care physician at [facility]    -     fenofibrate (TRICOR) 54 MG tablet; Take 1 tablet (54 mg total) by mouth daily  -     Microalbumin / creatinine urine ratio; Future    Mixed hyperlipidemia  As triglycerides improved, I have reduce dose of TriCor to 54 mg daily  Continue statins  Follow-up lipid profile in 4 months  Continue dietary modification  -     fenofibrate (TRICOR) 54 MG tablet; Take 1 tablet (54 mg total) by mouth daily  -     Lipid panel Lab Collect Lab Collect; Future    Vitamin D deficiency  Start taking vitamin-D supplementation 2000 International Units daily  -     Vitamin D 25 hydroxy; Future    Diabetic neuropathy  Followed by PCP  Currently stable on Gabapentin       Discussed with the patient diagnosis and treatment and all questions fully answered  She will call me if any problems arise  Counseled patient on diagnostic results, prognosis, risk and benefit of treatment options, instruction for management, importance of treatment compliance, risk factor reduction and impressions        Campbell Chery MD

## 2020-04-06 ENCOUNTER — HOSPITAL ENCOUNTER (INPATIENT)
Facility: HOSPITAL | Age: 50
LOS: 5 days | Discharge: HOME | DRG: 178 | End: 2020-04-12
Attending: EMERGENCY MEDICINE | Admitting: INTERNAL MEDICINE
Payer: COMMERCIAL

## 2020-04-06 ENCOUNTER — APPOINTMENT (EMERGENCY)
Dept: RADIOLOGY | Facility: HOSPITAL | Age: 50
DRG: 178 | End: 2020-04-06
Payer: COMMERCIAL

## 2020-04-06 DIAGNOSIS — U07.1 COVID-19 VIRUS INFECTION: Primary | ICD-10-CM

## 2020-04-06 DIAGNOSIS — R09.02 HYPOXIA: ICD-10-CM

## 2020-04-06 PROBLEM — R73.03 PREDIABETES: Status: ACTIVE | Noted: 2019-03-25

## 2020-04-06 PROBLEM — E11.42 DIABETIC POLYNEUROPATHY ASSOCIATED WITH TYPE 2 DIABETES MELLITUS (HCC): Status: ACTIVE | Noted: 2019-04-02

## 2020-04-06 PROBLEM — F41.1 GAD (GENERALIZED ANXIETY DISORDER): Status: ACTIVE | Noted: 2017-02-21

## 2020-04-06 PROBLEM — R07.9 CHEST PAIN: Status: RESOLVED | Noted: 2019-03-25 | Resolved: 2020-04-06

## 2020-04-06 PROBLEM — R06.02 SHORTNESS OF BREATH: Status: ACTIVE | Noted: 2020-04-06

## 2020-04-06 PROBLEM — R03.0 ELEVATED BLOOD PRESSURE READING: Status: RESOLVED | Noted: 2019-03-25 | Resolved: 2020-04-06

## 2020-04-06 PROBLEM — J30.1 SEASONAL ALLERGIC RHINITIS DUE TO POLLEN: Status: ACTIVE | Noted: 2017-02-21

## 2020-04-06 LAB
ALBUMIN SERPL BCP-MCNC: 3.5 G/DL (ref 3.5–5)
ALP SERPL-CCNC: 63 U/L (ref 46–116)
ALT SERPL W P-5'-P-CCNC: 25 U/L (ref 12–78)
ANION GAP SERPL CALCULATED.3IONS-SCNC: 8 MMOL/L (ref 4–13)
AST SERPL W P-5'-P-CCNC: 19 U/L (ref 5–45)
ATRIAL RATE: 87 BPM
BASOPHILS # BLD AUTO: 0.01 THOUSANDS/ΜL (ref 0–0.1)
BASOPHILS NFR BLD AUTO: 0 % (ref 0–1)
BILIRUB SERPL-MCNC: 0.48 MG/DL (ref 0.2–1)
BUN SERPL-MCNC: 11 MG/DL (ref 5–25)
CALCIUM SERPL-MCNC: 8.8 MG/DL (ref 8.3–10.1)
CHLORIDE SERPL-SCNC: 99 MMOL/L (ref 100–108)
CO2 SERPL-SCNC: 30 MMOL/L (ref 21–32)
CREAT SERPL-MCNC: 0.74 MG/DL (ref 0.6–1.3)
EOSINOPHIL # BLD AUTO: 0.01 THOUSAND/ΜL (ref 0–0.61)
EOSINOPHIL NFR BLD AUTO: 0 % (ref 0–6)
ERYTHROCYTE [DISTWIDTH] IN BLOOD BY AUTOMATED COUNT: 11.9 % (ref 11.6–15.1)
GFR SERPL CREATININE-BSD FRML MDRD: 94 ML/MIN/1.73SQ M
GLUCOSE SERPL-MCNC: 102 MG/DL (ref 65–140)
GLUCOSE SERPL-MCNC: 112 MG/DL (ref 65–140)
GLUCOSE SERPL-MCNC: 154 MG/DL (ref 65–140)
HCT VFR BLD AUTO: 37.7 % (ref 34.8–46.1)
HGB BLD-MCNC: 12.9 G/DL (ref 11.5–15.4)
IMM GRANULOCYTES # BLD AUTO: 0.02 THOUSAND/UL (ref 0–0.2)
IMM GRANULOCYTES NFR BLD AUTO: 1 % (ref 0–2)
LACTATE SERPL-SCNC: 0.8 MMOL/L (ref 0.5–2)
LYMPHOCYTES # BLD AUTO: 1.32 THOUSANDS/ΜL (ref 0.6–4.47)
LYMPHOCYTES NFR BLD AUTO: 34 % (ref 14–44)
MCH RBC QN AUTO: 30.5 PG (ref 26.8–34.3)
MCHC RBC AUTO-ENTMCNC: 34.2 G/DL (ref 31.4–37.4)
MCV RBC AUTO: 89 FL (ref 82–98)
MONOCYTES # BLD AUTO: 0.51 THOUSAND/ΜL (ref 0.17–1.22)
MONOCYTES NFR BLD AUTO: 13 % (ref 4–12)
NEUTROPHILS # BLD AUTO: 2.05 THOUSANDS/ΜL (ref 1.85–7.62)
NEUTS SEG NFR BLD AUTO: 52 % (ref 43–75)
NRBC BLD AUTO-RTO: 0 /100 WBCS
P AXIS: 67 DEGREES
PLATELET # BLD AUTO: 151 THOUSANDS/UL (ref 149–390)
PMV BLD AUTO: 12 FL (ref 8.9–12.7)
POTASSIUM SERPL-SCNC: 3.6 MMOL/L (ref 3.5–5.3)
PR INTERVAL: 136 MS
PROT SERPL-MCNC: 7.5 G/DL (ref 6.4–8.2)
QRS AXIS: 19 DEGREES
QRSD INTERVAL: 80 MS
QT INTERVAL: 336 MS
QTC INTERVAL: 404 MS
RBC # BLD AUTO: 4.23 MILLION/UL (ref 3.81–5.12)
SODIUM SERPL-SCNC: 137 MMOL/L (ref 136–145)
T WAVE AXIS: 42 DEGREES
TROPONIN I SERPL-MCNC: <0.02 NG/ML
VENTRICULAR RATE: 87 BPM
WBC # BLD AUTO: 3.92 THOUSAND/UL (ref 4.31–10.16)

## 2020-04-06 PROCEDURE — 99285 EMERGENCY DEPT VISIT HI MDM: CPT | Performed by: EMERGENCY MEDICINE

## 2020-04-06 PROCEDURE — 93010 ELECTROCARDIOGRAM REPORT: CPT | Performed by: INTERNAL MEDICINE

## 2020-04-06 PROCEDURE — 71045 X-RAY EXAM CHEST 1 VIEW: CPT

## 2020-04-06 PROCEDURE — 99223 1ST HOSP IP/OBS HIGH 75: CPT | Performed by: INTERNAL MEDICINE

## 2020-04-06 PROCEDURE — 82948 REAGENT STRIP/BLOOD GLUCOSE: CPT

## 2020-04-06 PROCEDURE — 85025 COMPLETE CBC W/AUTO DIFF WBC: CPT | Performed by: EMERGENCY MEDICINE

## 2020-04-06 PROCEDURE — 93005 ELECTROCARDIOGRAM TRACING: CPT

## 2020-04-06 PROCEDURE — 83605 ASSAY OF LACTIC ACID: CPT | Performed by: EMERGENCY MEDICINE

## 2020-04-06 PROCEDURE — 99285 EMERGENCY DEPT VISIT HI MDM: CPT

## 2020-04-06 PROCEDURE — 80053 COMPREHEN METABOLIC PANEL: CPT | Performed by: EMERGENCY MEDICINE

## 2020-04-06 PROCEDURE — 36415 COLL VENOUS BLD VENIPUNCTURE: CPT | Performed by: EMERGENCY MEDICINE

## 2020-04-06 PROCEDURE — 96360 HYDRATION IV INFUSION INIT: CPT

## 2020-04-06 PROCEDURE — 96361 HYDRATE IV INFUSION ADD-ON: CPT

## 2020-04-06 PROCEDURE — 84484 ASSAY OF TROPONIN QUANT: CPT | Performed by: EMERGENCY MEDICINE

## 2020-04-06 RX ORDER — ASPIRIN 81 MG/1
81 TABLET, CHEWABLE ORAL DAILY
Status: DISCONTINUED | OUTPATIENT
Start: 2020-04-07 | End: 2020-04-12 | Stop reason: HOSPADM

## 2020-04-06 RX ORDER — ONDANSETRON 2 MG/ML
4 INJECTION INTRAMUSCULAR; INTRAVENOUS EVERY 6 HOURS PRN
Status: DISCONTINUED | OUTPATIENT
Start: 2020-04-06 | End: 2020-04-06

## 2020-04-06 RX ORDER — BENZONATATE 100 MG/1
100 CAPSULE ORAL 3 TIMES DAILY PRN
Status: DISCONTINUED | OUTPATIENT
Start: 2020-04-06 | End: 2020-04-08

## 2020-04-06 RX ORDER — HYDROXYCHLOROQUINE SULFATE 200 MG/1
400 TABLET, FILM COATED ORAL EVERY 12 HOURS
Status: COMPLETED | OUTPATIENT
Start: 2020-04-06 | End: 2020-04-07

## 2020-04-06 RX ORDER — HYDROXYCHLOROQUINE SULFATE 200 MG/1
200 TABLET, FILM COATED ORAL 2 TIMES DAILY
Status: COMPLETED | OUTPATIENT
Start: 2020-04-07 | End: 2020-04-11

## 2020-04-06 RX ORDER — GABAPENTIN 100 MG/1
100 CAPSULE ORAL 3 TIMES DAILY
Status: DISCONTINUED | OUTPATIENT
Start: 2020-04-06 | End: 2020-04-12 | Stop reason: HOSPADM

## 2020-04-06 RX ORDER — ACETAMINOPHEN 325 MG/1
975 TABLET ORAL EVERY 6 HOURS PRN
Status: DISCONTINUED | OUTPATIENT
Start: 2020-04-06 | End: 2020-04-12 | Stop reason: HOSPADM

## 2020-04-06 RX ORDER — PRAVASTATIN SODIUM 40 MG
40 TABLET ORAL
Status: DISCONTINUED | OUTPATIENT
Start: 2020-04-06 | End: 2020-04-12 | Stop reason: HOSPADM

## 2020-04-06 RX ORDER — SODIUM CHLORIDE 9 MG/ML
100 INJECTION, SOLUTION INTRAVENOUS CONTINUOUS
Status: DISCONTINUED | OUTPATIENT
Start: 2020-04-06 | End: 2020-04-08

## 2020-04-06 RX ORDER — FENOFIBRATE 48 MG/1
54 TABLET, COATED ORAL DAILY
Status: DISCONTINUED | OUTPATIENT
Start: 2020-04-07 | End: 2020-04-12 | Stop reason: HOSPADM

## 2020-04-06 RX ADMIN — ENOXAPARIN SODIUM 40 MG: 40 INJECTION SUBCUTANEOUS at 14:05

## 2020-04-06 RX ADMIN — SODIUM CHLORIDE 500 ML: 0.9 INJECTION, SOLUTION INTRAVENOUS at 07:15

## 2020-04-06 RX ADMIN — HYDROXYCHLOROQUINE SULFATE 400 MG: 200 TABLET, FILM COATED ORAL at 14:04

## 2020-04-06 RX ADMIN — BENZONATATE 100 MG: 100 CAPSULE ORAL at 22:16

## 2020-04-06 RX ADMIN — PRAVASTATIN SODIUM 40 MG: 40 TABLET ORAL at 16:12

## 2020-04-06 RX ADMIN — SODIUM CHLORIDE 100 ML/HR: 0.9 INJECTION, SOLUTION INTRAVENOUS at 16:12

## 2020-04-06 RX ADMIN — ACETAMINOPHEN 975 MG: 325 TABLET, FILM COATED ORAL at 23:39

## 2020-04-06 RX ADMIN — GABAPENTIN 100 MG: 100 CAPSULE ORAL at 22:16

## 2020-04-06 RX ADMIN — GABAPENTIN 100 MG: 100 CAPSULE ORAL at 16:12

## 2020-04-06 NOTE — PLAN OF CARE
Problem: RESPIRATORY - ADULT  Goal: Achieves optimal ventilation and oxygenation  Description  INTERVENTIONS:  - Assess for changes in respiratory status  - Assess for changes in mentation and behavior  - Position to facilitate oxygenation and minimize respiratory effort  - Oxygen administered by appropriate delivery if ordered  - Initiate smoking cessation education as indicated  - Encourage broncho-pulmonary hygiene including cough, deep breathe, Incentive Spirometry  - Assess the need for suctioning and aspirate as needed  - Assess and instruct to report SOB or any respiratory difficulty  - Respiratory Therapy support as indicated  Outcome: Progressing     Problem: INFECTION - ADULT  Goal: Absence or prevention of progression during hospitalization  Description  INTERVENTIONS:  - Assess and monitor for signs and symptoms of infection  - Monitor lab/diagnostic results  - Monitor all insertion sites, i e  indwelling lines, tubes, and drains  - Monitor endotracheal if appropriate and nasal secretions for changes in amount and color  - [place] appropriate cooling/warming therapies per order  - Administer medications as ordered  - Instruct and encourage patient and family to use good hand hygiene technique  - Identify and instruct in appropriate isolation precautions for identified infection/condition  Outcome: Progressing     Problem: Nutrition/Hydration-ADULT  Goal: Nutrient/Hydration intake appropriate for improving, restoring or maintaining nutritional needs  Description  Monitor and assess patient's nutrition/hydration status for malnutrition  Collaborate with interdisciplinary team and initiate plan and interventions as ordered  Monitor patient's weight and dietary intake as ordered or per policy  Utilize nutrition screening tool and intervene as necessary  Determine patient's food preferences and provide high-protein, high-caloric foods as appropriate       INTERVENTIONS:  - Monitor oral intake, urinary output, labs, and treatment plans  - Assess nutrition and hydration status and recommend course of action  - Evaluate amount of meals eaten  - Assist patient with eating if necessary   - Allow adequate time for meals  - Recommend/ encourage appropriate diets, oral nutritional supplements, and vitamin/mineral supplements  - Order, calculate, and assess calorie counts as needed  - Recommend, monitor, and adjust tube feedings and TPN/PPN based on assessed needs  - Assess need for intravenous fluids  - Provide specific nutrition/hydration education as appropriate  - Include patient/family/caregiver in decisions related to nutrition  Outcome: Progressing

## 2020-04-06 NOTE — ASSESSMENT & PLAN NOTE
· Confirmed COVID-19 positive at outside facility on 3/30/20  Symptoms started approximately 7 days ago with myalgias and febrile episodes at home with T-max up to 101  Developed dry cough and initially started feeling better though over the last few days she started feeling progressive shortness of breath  Feels very weak  Reports fever still at home  T-max of 99 6 currently  Denies any nausea or vomiting  Denies any diarrhea  Reports decreased p o  Intake due to not having an appetite  · IV hydration with normal saline at 100 cc an hour  · 975 mg p o  Tylenol q 6 p r n  For fevers/headache/myalgias  Monitor fever curve and WBC count  · Zofran p r n  · Tessalon Perles  · Plaquenil started 4/6/20 as outlined in principal problem  QTC on admission is 404  Patient is not on other prolonging QTC medications that would require continuous monitoring with telemetry    · Rest of plan as outlined in principal problem

## 2020-04-06 NOTE — ASSESSMENT & PLAN NOTE
· Last A1c was was 6 2 in July 2019  Recheck A1c  · Start diabetic diet and ISS    Hold home [place]

## 2020-04-06 NOTE — ED PROVIDER NOTES
History  Chief Complaint   Patient presents with    Shortness of Breath     Pt states that she tested positive for COVID - 19 a week ago and states that she thought she was getting better until last night where she ended up feeling short of breath and coughing more  History provided by:  Patient   used: No    Shortness of Breath   Associated symptoms: cough and fever    Associated symptoms: no abdominal pain, no chest pain, no diaphoresis, no headaches, no neck pain, no rash, no sore throat, no vomiting and no wheezing      Patient is a 47-year-old female presenting to emergency department due to cough, shortness of breath and fevers  States she initially was diagnosed with COVID 7 days ago where she started having back pain and fevers  She started feeling better but since last night she is feeling worse  No pain  No sputum  No vomiting  No diarrhea  No abdominal pain  Patient is diabetic  No other medical problems  No smoking  No drugs  No alcohol  She is a former smoker  MDM will check EKG, troponin, signs of cardiac damage, chest x-ray, electrolyte        Prior to Admission Medications   Prescriptions Last Dose Informant Patient Reported? Taking?    SYNJARDY XR  MG TB24 4/6/2020 at Unknown time  Yes Yes   Sig: Take 15 mg by mouth daily in the early morning   aspirin 81 mg chewable tablet 4/6/2020 at Unknown time  No Yes   Sig: Chew 1 tablet (81 mg total) daily   fenofibrate (TRICOR) 54 MG tablet 4/6/2020 at Unknown time  No Yes   Sig: Take 1 tablet (54 mg total) by mouth daily   gabapentin (NEURONTIN) 100 mg capsule 4/6/2020 at Unknown time  No Yes   Sig: Take 1 capsule (100 mg total) by mouth 3 (three) times a day   pravastatin (PRAVACHOL) 40 mg tablet 4/5/2020 at Unknown time  No Yes   Sig: Take 1 tablet (40 mg total) by mouth daily with dinner      Facility-Administered Medications: None       Past Medical History:   Diagnosis Date    Diabetes mellitus ([facility] [address] )  Lyme disease     treated 6 years ago - 1+ month medication       Past Surgical History:   Procedure Laterality Date    CHOLECYSTECTOMY         Family History   Problem Relation Age of Onset    Deep vein thrombosis Mother     Hypertension Mother    Lewis Hoyt COPD Father     Diabetes Father     Hypertension Sister     Heart failure Maternal Grandmother          age 71    Heart disease Maternal Grandfather     Heart disease Paternal Grandfather      I have reviewed and agree with the history as documented  E-Cigarette/Vaping     E-Cigarette/Vaping Substances     Social History     Tobacco Use    Smoking status: Former Smoker     Packs/day: 1 00     Years: 18 00     Pack years: 18 00     Types: Cigarettes     Last attempt to quit: 3/25/2009     Years since quittin 0    Smokeless tobacco: Never Used   Substance Use Topics    Alcohol use: Never     Frequency: Never    Drug use: Never       Review of Systems   Constitutional: Positive for fever  Negative for chills and diaphoresis  HENT: Negative for congestion and sore throat  Respiratory: Positive for cough and shortness of breath  Negative for wheezing and stridor  Cardiovascular: Negative for chest pain, palpitations and leg swelling  Gastrointestinal: Negative for abdominal pain, blood in stool, diarrhea, nausea and vomiting  Genitourinary: Negative for dysuria, frequency and urgency  Musculoskeletal: Negative for neck pain and neck stiffness  Skin: Negative for pallor and rash  Neurological: Negative for dizziness, syncope, weakness, light-headedness and headaches  All other systems reviewed and are negative  Physical Exam  Physical Exam   Constitutional: She is oriented to person, place, and time  She appears well-developed and well-nourished  HENT:   Head: Normocephalic and atraumatic  Eyes: Pupils are equal, round, and reactive to light  Neck: Neck supple     Cardiovascular: Normal rate, regular rhythm, normal heart sounds and intact distal pulses  Pulmonary/Chest: Effort normal and breath sounds normal  No respiratory distress  Abdominal: Soft  Bowel sounds are normal  There is no tenderness  Musculoskeletal:        Right lower leg: She exhibits no tenderness and no edema  Left lower leg: She exhibits no tenderness  Neurological: She is alert and oriented to person, place, and time  Skin: Skin is warm and dry  Capillary refill takes less than 2 seconds  Vitals reviewed        Vital Signs  ED Triage Vitals   Temperature Pulse Respirations Blood Pressure SpO2   04/06/20 0659 04/06/20 0659 04/06/20 0659 04/06/20 0659 04/06/20 0659   99 6 °F (37 6 °C) 93 16 120/66 97 %      Temp Source Heart Rate Source Patient Position - Orthostatic VS BP Location FiO2 (%)   04/06/20 1240 04/06/20 0659 04/06/20 0659 04/06/20 0659 --   Oral Monitor Lying Right arm       Pain Score       04/06/20 1155       No Pain           Vitals:    04/06/20 1045 04/06/20 1130 04/06/20 1155 04/06/20 1240   BP: 101/57 101/53 91/51 100/50   Pulse: 78 84 84 81   Patient Position - Orthostatic VS:  Lying Lying Lying         Visual Acuity  Visual Acuity      Most Recent Value   L Pupil Size (mm)  4   R Pupil Size (mm)  4          ED Medications  Medications   aspirin chewable tablet 81 mg (has no administration in time range)   fenofibrate (TRICOR) tablet 48 mg (has no administration in time range)   gabapentin (NEURONTIN) capsule 100 mg (has no administration in time range)   pravastatin (PRAVACHOL) tablet 40 mg (has no administration in time range)   ondansetron (ZOFRAN) injection 4 mg (has no administration in time range)   enoxaparin (LOVENOX) subcutaneous injection 40 mg (40 mg Subcutaneous Given 4/6/20 1405)   acetaminophen (TYLENOL) tablet 975 mg (has no administration in time range)   hydroxychloroquine (PLAQUENIL) tablet 400 mg (400 mg Oral Given 4/6/20 1404)   hydroxychloroquine (PLAQUENIL) tablet 200 mg (has no administration in time range)   insulin lispro (HumaLOG) 100 units/mL subcutaneous injection 1-5 Units (has no administration in time range)   insulin lispro (HumaLOG) 100 units/mL subcutaneous injection 1-5 Units (has no administration in time range)   sodium chloride 0 9 % infusion (has no administration in time range)   sodium chloride 0 9 % bolus 500 mL (0 mL Intravenous Stopped 4/6/20 0900)       Diagnostic Studies  Results Reviewed     Procedure Component Value Units Date/Time    Lactic acid, plasma x2 [[id number]]  (Normal) Collected:  04/06/20 0710    Lab Status:  Final result Specimen:  Blood from Arm, Right Updated:  04/06/20 0753     LACTIC ACID 0 8 mmol/L     Narrative:       Result may be elevated if tourniquet was used during collection      Troponin I [[id number]]  (Normal) Collected:  04/06/20 0710    Lab Status:  Final result Specimen:  Blood from Arm, Right Updated:  04/06/20 0745     Troponin I <0 02 ng/mL     CBC and differential [[id number]]  (Abnormal) Collected:  04/06/20 0710    Lab Status:  Final result Specimen:  Blood from Arm, Right Updated:  04/06/20 0742     WBC 3 92 Thousand/uL      RBC 4 23 Million/uL      Hemoglobin 12 9 g/dL      Hematocrit 37 7 %      MCV 89 fL      MCH 30 5 pg      MCHC 34 2 g/dL      RDW 11 9 %      MPV 12 0 fL      Platelets 701 Thousands/uL      nRBC 0 /100 WBCs      Neutrophils Relative 52 %      Immat GRANS % 1 %      Lymphocytes Relative 34 %      Monocytes Relative 13 %      Eosinophils Relative 0 %      Basophils Relative 0 %      Neutrophils Absolute 2 05 Thousands/µL      Immature Grans Absolute 0 02 Thousand/uL      Lymphocytes Absolute 1 32 Thousands/µL      Monocytes Absolute 0 51 Thousand/µL      Eosinophils Absolute 0 01 Thousand/µL      Basophils Absolute 0 01 Thousands/µL     Comprehensive metabolic panel [[id number]]  (Abnormal) Collected:  04/06/20 0710    Lab Status:  Final result Specimen:  Blood from Arm, Right Updated:  04/06/20 0742     Sodium 137 mmol/L Potassium 3 6 mmol/L      Chloride 99 mmol/L      CO2 30 mmol/L      ANION GAP 8 mmol/L      BUN 11 mg/dL      Creatinine 0 74 mg/dL      Glucose 112 mg/dL      Calcium 8 8 mg/dL      AST 19 U/L      ALT 25 U/L      Alkaline Phosphatase 63 U/L      Total Protein 7 5 g/dL      Albumin 3 5 g/dL      Total Bilirubin 0 48 mg/dL      eGFR 94 ml/min/1 73sq m     Narrative:       [place] guidelines for Chronic Kidney Disease (CKD):     Stage 1 with normal or high GFR (GFR > 90 mL/min/1 73 square meters)    Stage 2 Mild CKD (GFR = 60-89 mL/min/1 73 square meters)    Stage 3A Moderate CKD (GFR = 45-59 mL/min/1 73 square meters)    Stage 3B Moderate CKD (GFR = 30-44 mL/min/1 73 square meters)    Stage 4 Severe CKD (GFR = 15-29 mL/min/1 73 square meters)    Stage 5 End Stage CKD (GFR <15 mL/min/1 73 square meters)  Note: GFR calculation is accurate only with a steady state creatinine                 XR chest 1 view portable   Final Result by Sun Medina MD (04/06 7516)      No acute cardiopulmonary disease  Workstation performed: [id number]                    Procedures  Procedures         ED Course  ED Course as of Apr 06 1456   Mon Apr 06, 2020   0726 ECG shows rate of 87, sinus, normal axis, normal QRS, no significant ischemic changes, normal intervals, no significant ST or T-wave changes, independently interpreted by me      0756 Patient's saturation trending down  dropped pressure  Five hundred of fluid given  Rebounded    Will observe      1588 Waiting for medicine to call back, paged 8:01 am                                            MDM      Disposition  Final diagnoses:   COVID-19 virus infection   Hypoxia     Time reflects when diagnosis was documented in both MDM as applicable and the Disposition within this note     Time User Action Codes Description Comment    4/6/2020  9:04 AM America Saba Add [U07 1] COVID-19 virus infection     4/6/2020  9:04 AM America Saba Add [R09 02] Hypoxia       ED Disposition     ED Disposition Condition Date/Time Comment    Admit Stable Mon Apr 6, 2020  9:04 AM Case was discussed with medicine and the patient's admission status was agreed to be Admission Status: observation status to the service of Dr Amol Ulloa   Follow-up Information    None         Current Discharge Medication List      CONTINUE these medications which have NOT CHANGED    Details   aspirin 81 mg chewable tablet Chew 1 tablet (81 mg total) daily  Qty: 30 tablet, Refills: 0    Associated Diagnoses: Dyslipidemia      fenofibrate (TRICOR) 54 MG tablet Take 1 tablet (54 mg total) by mouth daily  Qty: 90 tablet, Refills: 1    Associated Diagnoses: Mixed hyperlipidemia; Type 2 diabetes mellitus with hyperglycemia, without long-term current use of insulin (HCC)      gabapentin (NEURONTIN) 100 mg capsule Take 1 capsule (100 mg total) by mouth 3 (three) times a day  Qty: 90 capsule, Refills: 0    Associated Diagnoses: Neuropathy      pravastatin (PRAVACHOL) 40 mg tablet Take 1 tablet (40 mg total) by mouth daily with dinner  Qty: 30 tablet, Refills: 0    Associated Diagnoses: Dyslipidemia      SYNJARDY XR  MG TB24 Take 15 mg by mouth daily in the early morning           No discharge procedures on file      PDMP Review     None          ED Provider  Electronically Signed by           Won Guillen MD  04/06/20 3385

## 2020-04-06 NOTE — H&P
H&P- Chidi Eldridge 1970, 44 y o  female MRN: [id number]    Unit/Bed#: S -01 Encounter: [id number]    Primary Care Provider: Shannon Yañez MD   Date and time admitted to hospital: 4/6/2020  6:57 AM        * Shortness of breath  Assessment & Plan  · Patient is confirmed positive COVID-19 infection at outside facility on 3/30/20  Complaints of dry cough and progressive dyspnea  Chest x-ray on admission showed no acute pulmonary process  Patient was saturating well on room air, initially 97% though had decreasing sats to 90%  Never hypoxic  Patient will be admitted on an observation basis to observe her respiratory status and provide supportive care  · Start Plaquenil 400 mg q 12 hours x2 doses, then 200 mg q 12 hours x8 doses  · Spot pulse ox q 4 hours  Supplemental oxygen via nasal cannula to maintain sats greater than 92% as needed  · Respiratory protocol  · Incentive spirometry  · Tessalon Perles t i d   · Supportive care  · Rest of plan as outlined below    COVID-19 virus detected  Assessment & Plan  · Confirmed COVID-19 positive at outside facility on 3/30/20  Symptoms started approximately 7 days ago with myalgias and febrile episodes at home with T-max up to 101  Developed dry cough and initially started feeling better though over the last few days she started feeling progressive shortness of breath  Feels very weak  Reports fever still at home  T-max of 99 6 currently  Denies any nausea or vomiting  Denies any diarrhea  Reports decreased p o  Intake due to not having an appetite  · IV hydration with normal saline at 100 cc an hour  · 975 mg p o  Tylenol q 6 p r n  For fevers/headache/myalgias  Monitor fever curve and WBC count  · Zofran p r n  · Tessalon Perles  · Plaquenil started 4/6/20 as outlined in principal problem  QTC on admission is 404  Patient is not on other prolonging QTC medications that would require continuous monitoring with telemetry    · Rest of plan as outlined in principal problem    Hyperlipidemia  Assessment & Plan  · Resume home pravastatin and fenofibrate    Prediabetes  Assessment & Plan  · Last A1c was was 6 2 in July 2019  Recheck A1c  · Start diabetic diet and ISS  Hold home Synjardy    VTE Prophylaxis: Enoxaparin (Lovenox)  / sequential compression device   Code Status:  Level 1 full code      Anticipated Length of Stay:  Patient will be admitted on an Observation basis with an anticipated length of stay of  < 2 midnights  Justification for Hospital Stay:  COVID 19 positive prior to presentation, progressively low O2 sats in the ED requiring frequent pulse ox monitoring and assess need for supplemental oxygen needs over the next 24 hours  If patient remains stable can likely be discharged tomorrow    Total Time for Visit, including Counseling / Coordination of Care: 45 minutes  Greater than 50% of this total time spent on direct patient counseling and coordination of care  Chief Complaint:   Worsening shortness of breath    History of Present Illness:    Amita Barragan is a 44 y o  female with history of type 2 diabetes, hyperlipidemia who presents with chief complaint of worsening shortness of breath  Patient reports that approximately 1 week ago at [facility] urgent care when she began with fevers and flu-like symptoms consisting of myalgias, decreased appetite, weakness  She reports fevers over the last week with T-max of 101 7°  She states she started feeling better for is the end of the week though over the last day or two has become progressively short of breath with frequent dry cough  This was progressive which concerned her prompting her to come to emergency department for evaluation  She denies any GI symptoms such as nausea, vomiting, diarrhea or abdominal pain  Former nicotine dependence and diabetic  Review of Systems:    Review of Systems   Constitutional: Positive for activity change, appetite change, chills, fatigue and fever  HENT: Negative for congestion, rhinorrhea and sore throat  Eyes: Negative for visual disturbance  Respiratory: Positive for cough and shortness of breath  Negative for wheezing and stridor  Cardiovascular: Negative for chest pain, palpitations and leg swelling  Gastrointestinal: Negative for abdominal distention, abdominal pain, blood in stool, diarrhea, nausea and vomiting  Genitourinary: Negative for difficulty urinating  Musculoskeletal: Positive for arthralgias, back pain and myalgias  Allergic/Immunologic: Negative for immunocompromised state  Neurological: Positive for weakness  Negative for dizziness, tremors, seizures, syncope and headaches  All other systems reviewed and are negative  Past Medical and Surgical History:     Past Medical History:   Diagnosis Date    Diabetes mellitus ([facility] [address] )     Lyme disease     treated 6 years ago - 1+ month medication       Past Surgical History:   Procedure Laterality Date    CHOLECYSTECTOMY         Meds/Allergies:    Prior to Admission medications    Medication Sig Start Date End Date Taking? Authorizing Provider   aspirin 81 mg chewable tablet Chew 1 tablet (81 mg total) daily 3/28/19  Yes Ami Hilliard MD   fenofibrate (TRICOR) 54 MG tablet Take 1 tablet (54 mg total) by mouth daily 7/17/19  Yes Sloan Pittman MD   gabapentin (NEURONTIN) 100 mg capsule Take 1 capsule (100 mg total) by mouth 3 (three) times a day 3/27/19  Yes Ami Hilliard MD   pravastatin (PRAVACHOL) 40 mg tablet Take 1 tablet (40 mg total) by mouth daily with dinner 3/27/19  Yes Ami Hilliard MD   SYNJARDY XR  MG TB24 Take 15 mg by mouth daily in the early morning 7/11/19  Yes Historical Provider, MD     I have reviewed home medications with patient personally  Allergies:    Allergies   Allergen Reactions    Semaglutide Rash       Social History:     Marital Status: Single     Substance Use History:   Social History     Substance and Sexual Activity   Alcohol Use Never    Frequency: Never     Social History     Tobacco Use   Smoking Status Former Smoker    Packs/day: 1 00    Years: 18 00    Pack years: 18 00    Types: Cigarettes    Last attempt to quit: 3/25/2009    Years since quittin 0   Smokeless Tobacco Never Used     Social History     Substance and Sexual Activity   Drug Use Never       Family History:    Family History   Problem Relation Age of Onset    Deep vein thrombosis Mother     Hypertension Mother     COPD Father     Diabetes Father     Hypertension Sister     Heart failure Maternal Grandmother          age 71    Heart disease Maternal Grandfather     Heart disease Paternal Grandfather        Physical Exam:     Vitals:   Blood Pressure: 94/50 (20 1504)  Pulse: 83 (20 1504)  Temperature: 99 1 °F (37 3 °C) (20 1504)  Temp Source: Oral (20 1504)  Respirations: 18 (20 1504)  Height: 5' 2" (157 5 cm) (20 1240)  Weight - Scale: 65 5 kg (144 lb 6 4 oz) (20 1240)  SpO2: 94 % (20 1514)    Physical Exam   Constitutional: She is oriented to person, place, and time  She appears well-developed and well-nourished  No distress  Ill-appearing   HENT:   Head: Normocephalic and atraumatic  Eyes: Pupils are equal, round, and reactive to light  Neck: Normal range of motion  No JVD present  Cardiovascular: Normal rate and regular rhythm  Exam reveals no friction rub  No murmur heard  Pulmonary/Chest: No respiratory distress  She has no wheezes  Decreased breath sounds bilaterally, poor inspiratory effort   Abdominal: Soft  She exhibits no distension  There is no tenderness  Neurological: She is alert and oriented to person, place, and time  No cranial nerve deficit  Skin: Skin is dry  She is not diaphoretic  Nursing note and vitals reviewed  Additional Data:     Lab Results: I have personally reviewed pertinent reports        Results from last 7 days   Lab Units 20  0710   WBC Thousand/uL 3 92*   HEMOGLOBIN g/dL 12 9   HEMATOCRIT % 37 7   PLATELETS Thousands/uL 151   NEUTROS PCT % 52   LYMPHS PCT % 34   MONOS PCT % 13*   EOS PCT % 0     Results from last 7 days   Lab Units 04/06/20  0710   POTASSIUM mmol/L 3 6   CHLORIDE mmol/L 99*   CO2 mmol/L 30   BUN mg/dL 11   CREATININE mg/dL 0 74   CALCIUM mg/dL 8 8   ALK PHOS U/L 63   ALT U/L 25   AST U/L 19           Imaging: I have personally reviewed pertinent reports  Xr Chest 1 View Portable    Result Date: 4/6/2020  Narrative: CHEST INDICATION:   covid +, new cough, fevers at home  COMPARISON:  None EXAM PERFORMED/VIEWS:  XR CHEST PORTABLE FINDINGS: Cardiomediastinal silhouette appears unremarkable  The lungs are clear  No pneumothorax or pleural effusion  Osseous structures appear within normal limits for patient age  Impression: No acute cardiopulmonary disease  Workstation performed: [id number]       Allscripts / Epic Records Reviewed: Yes     ** Please Note: This note has been constructed using a voice recognition system   **

## 2020-04-06 NOTE — ED NOTES
Pt ambulated to restroom by self, patient c/o increased SOB with ambulation  Clean catch UA sample collected and held  VS  Will continue to monitor       Isabel Coto RN  04/06/20 6080 Jens Woodman, RN  04/06/20 0555

## 2020-04-06 NOTE — ASSESSMENT & PLAN NOTE
· Patient is confirmed positive COVID-19 infection at outside facility on 3/30/20  Complaints of dry cough and progressive dyspnea  Chest x-ray on admission showed no acute pulmonary process  Patient was saturating well on room air, initially 97% though had decreasing sats to 90%  Never hypoxic  Patient will be admitted on an observation basis to observe her respiratory status and provide supportive care  · Start Plaquenil 400 mg q 12 hours x2 doses, then 200 mg q 12 hours x8 doses  · Spot pulse ox q 4 hours    Supplemental oxygen via nasal cannula to maintain sats greater than 92% as needed  · Respiratory protocol  · Incentive spirometry  · Tessalon Perles t i d   · Supportive care  · Rest of plan as outlined below

## 2020-04-07 LAB
ANION GAP SERPL CALCULATED.3IONS-SCNC: 7 MMOL/L (ref 4–13)
BASOPHILS # BLD AUTO: 0.01 THOUSANDS/ΜL (ref 0–0.1)
BASOPHILS NFR BLD AUTO: 0 % (ref 0–1)
BUN SERPL-MCNC: 5 MG/DL (ref 5–25)
CALCIUM SERPL-MCNC: 8 MG/DL (ref 8.3–10.1)
CHLORIDE SERPL-SCNC: 105 MMOL/L (ref 100–108)
CO2 SERPL-SCNC: 29 MMOL/L (ref 21–32)
CREAT SERPL-MCNC: 0.67 MG/DL (ref 0.6–1.3)
EOSINOPHIL # BLD AUTO: 0.01 THOUSAND/ΜL (ref 0–0.61)
EOSINOPHIL NFR BLD AUTO: 0 % (ref 0–6)
ERYTHROCYTE [DISTWIDTH] IN BLOOD BY AUTOMATED COUNT: 12 % (ref 11.6–15.1)
EST. AVERAGE GLUCOSE BLD GHB EST-MCNC: 126 MG/DL
GFR SERPL CREATININE-BSD FRML MDRD: 102 ML/MIN/1.73SQ M
GLUCOSE SERPL-MCNC: 115 MG/DL (ref 65–140)
GLUCOSE SERPL-MCNC: 143 MG/DL (ref 65–140)
GLUCOSE SERPL-MCNC: 164 MG/DL (ref 65–140)
GLUCOSE SERPL-MCNC: 200 MG/DL (ref 65–140)
GLUCOSE SERPL-MCNC: 93 MG/DL (ref 65–140)
HBA1C MFR BLD: 6 %
HCT VFR BLD AUTO: 31.7 % (ref 34.8–46.1)
HGB BLD-MCNC: 10.6 G/DL (ref 11.5–15.4)
IMM GRANULOCYTES # BLD AUTO: 0.01 THOUSAND/UL (ref 0–0.2)
IMM GRANULOCYTES NFR BLD AUTO: 0 % (ref 0–2)
LYMPHOCYTES # BLD AUTO: 1.21 THOUSANDS/ΜL (ref 0.6–4.47)
LYMPHOCYTES NFR BLD AUTO: 41 % (ref 14–44)
MCH RBC QN AUTO: 30.5 PG (ref 26.8–34.3)
MCHC RBC AUTO-ENTMCNC: 33.4 G/DL (ref 31.4–37.4)
MCV RBC AUTO: 91 FL (ref 82–98)
MONOCYTES # BLD AUTO: 0.38 THOUSAND/ΜL (ref 0.17–1.22)
MONOCYTES NFR BLD AUTO: 13 % (ref 4–12)
NEUTROPHILS # BLD AUTO: 1.35 THOUSANDS/ΜL (ref 1.85–7.62)
NEUTS SEG NFR BLD AUTO: 46 % (ref 43–75)
NRBC BLD AUTO-RTO: 0 /100 WBCS
PLATELET # BLD AUTO: 139 THOUSANDS/UL (ref 149–390)
PMV BLD AUTO: 12.1 FL (ref 8.9–12.7)
POTASSIUM SERPL-SCNC: 3.5 MMOL/L (ref 3.5–5.3)
PROCALCITONIN SERPL-MCNC: <0.05 NG/ML
RBC # BLD AUTO: 3.47 MILLION/UL (ref 3.81–5.12)
SODIUM SERPL-SCNC: 141 MMOL/L (ref 136–145)
WBC # BLD AUTO: 2.97 THOUSAND/UL (ref 4.31–10.16)

## 2020-04-07 PROCEDURE — 99232 SBSQ HOSP IP/OBS MODERATE 35: CPT | Performed by: INTERNAL MEDICINE

## 2020-04-07 PROCEDURE — 82948 REAGENT STRIP/BLOOD GLUCOSE: CPT

## 2020-04-07 PROCEDURE — 84145 PROCALCITONIN (PCT): CPT | Performed by: INTERNAL MEDICINE

## 2020-04-07 PROCEDURE — 80048 BASIC METABOLIC PNL TOTAL CA: CPT | Performed by: INTERNAL MEDICINE

## 2020-04-07 PROCEDURE — 83036 HEMOGLOBIN GLYCOSYLATED A1C: CPT | Performed by: INTERNAL MEDICINE

## 2020-04-07 PROCEDURE — 85025 COMPLETE CBC W/AUTO DIFF WBC: CPT | Performed by: INTERNAL MEDICINE

## 2020-04-07 RX ORDER — POTASSIUM CHLORIDE 20 MEQ/1
40 TABLET, EXTENDED RELEASE ORAL ONCE
Status: COMPLETED | OUTPATIENT
Start: 2020-04-07 | End: 2020-04-07

## 2020-04-07 RX ORDER — AZITHROMYCIN 250 MG/1
250 TABLET, FILM COATED ORAL EVERY 24 HOURS
Status: DISCONTINUED | OUTPATIENT
Start: 2020-04-08 | End: 2020-04-11

## 2020-04-07 RX ORDER — AZITHROMYCIN 250 MG/1
500 TABLET, FILM COATED ORAL EVERY 24 HOURS
Status: COMPLETED | OUTPATIENT
Start: 2020-04-07 | End: 2020-04-07

## 2020-04-07 RX ORDER — AZITHROMYCIN 250 MG/1
500 TABLET, FILM COATED ORAL EVERY 24 HOURS
Status: DISCONTINUED | OUTPATIENT
Start: 2020-04-08 | End: 2020-04-07

## 2020-04-07 RX ADMIN — PRAVASTATIN SODIUM 40 MG: 40 TABLET ORAL at 17:56

## 2020-04-07 RX ADMIN — GABAPENTIN 100 MG: 100 CAPSULE ORAL at 22:03

## 2020-04-07 RX ADMIN — INSULIN LISPRO 1 UNITS: 100 INJECTION, SOLUTION INTRAVENOUS; SUBCUTANEOUS at 22:03

## 2020-04-07 RX ADMIN — HYDROXYCHLOROQUINE SULFATE 200 MG: 200 TABLET, FILM COATED ORAL at 12:48

## 2020-04-07 RX ADMIN — GABAPENTIN 100 MG: 100 CAPSULE ORAL at 17:56

## 2020-04-07 RX ADMIN — FENOFIBRATE 48 MG: 48 TABLET, FILM COATED ORAL at 08:48

## 2020-04-07 RX ADMIN — HYDROXYCHLOROQUINE SULFATE 400 MG: 200 TABLET, FILM COATED ORAL at 02:35

## 2020-04-07 RX ADMIN — POTASSIUM CHLORIDE 40 MEQ: 1500 TABLET, EXTENDED RELEASE ORAL at 08:48

## 2020-04-07 RX ADMIN — ACETAMINOPHEN 975 MG: 325 TABLET, FILM COATED ORAL at 17:57

## 2020-04-07 RX ADMIN — AZITHROMYCIN 500 MG: 250 TABLET, FILM COATED ORAL at 12:48

## 2020-04-07 RX ADMIN — INSULIN LISPRO 1 UNITS: 100 INJECTION, SOLUTION INTRAVENOUS; SUBCUTANEOUS at 11:53

## 2020-04-07 RX ADMIN — SODIUM CHLORIDE 100 ML/HR: 0.9 INJECTION, SOLUTION INTRAVENOUS at 23:43

## 2020-04-07 RX ADMIN — ASPIRIN 81 MG 81 MG: 81 TABLET ORAL at 08:48

## 2020-04-07 RX ADMIN — ENOXAPARIN SODIUM 40 MG: 40 INJECTION SUBCUTANEOUS at 08:48

## 2020-04-07 RX ADMIN — GABAPENTIN 100 MG: 100 CAPSULE ORAL at 08:48

## 2020-04-07 NOTE — PROGRESS NOTES
Progress Note - Kareem Dennis 1970, 44 y o  female MRN: [id number]    Unit/Bed#: S -01 Encounter: [id number]    Primary Care Provider: Natalie Miller MD   Date and time admitted to hospital: 4/6/2020  6:57 AM      * COVID-19 virus detected  Assessment & Plan  · Confirmed COVID-19 positive at outside facility on 3/30/20  Symptoms started approximately 7 days ago with myalgias and febrile episodes at home with T-max up to 101  With dry cough, myalgias, fatigue and weakness  Denies any nausea or vomiting  Denies any diarrhea  Reports decreased p o  Intake due to not having an appetite  · IV hydration with normal saline at 100 cc an hour  · 975 mg p o  Tylenol q 6 p r n  For fevers/headache/myalgias  · Zofran p r n  · Lance Marshall  · Was initiated on Plaquenil 600 mg q 12 hours for day 1, then 200 mg q 8 hours x8 doses, currently day 2  QTC of 404  · Will start on azithromycin 500 mg for day 1, 250 mg subsequent for days 2-5  · Will check for procalcitonin, if negative will discontinue antibiotic  · With noted low-grade fever last night  Trend fever curve  · Monitor CBC    Shortness of breath  Assessment & Plan  · In the setting of positive COVID-19 infection detected an outside facility on 3/30/20  Complaints of dry cough and progressive dyspnea  · Chest x-ray on admission showed no acute pulmonary process  Patient was saturating well on room air, initially 97% though had decreasing sats to 90%    · With complaints of worsening shortness of breath last night, with decrease in O2 sats 87%, currently not requiring any O2  · Continue to monitor O2 sats and give O2 supplementation as necessary, keep above 90%  · Continue with Plaquenil, started on azithromycin  · Respiratory protocol  · Incentive spirometry  · Tessalon Perles t i d   · Supportive care    Hyperlipidemia  Assessment & Plan  · Resume home pravastatin and fenofibrate    Prediabetes  Assessment & Plan  · Last A1c was was 6 2 in July 2019  Recheck A1c  · Start diabetic diet and ISS  · Hold home Synjardy      VTE Pharmacologic Prophylaxis:   Pharmacologic: Enoxaparin (Lovenox)  Mechanical VTE Prophylaxis in Place: Yes    Discussions with Specialists or Other Care Team Provider:  Discussed with the nurse, attending    Education and Discussions with Family / Patient:  Discussed with the patient    Current Length of Stay: 0 day(s)    Current Patient Status: Observation     Discharge Plan / Estimated Discharge Date: To be determined based on clinical course, need further monitoring, anticipate within 24-48 hours    Code Status: Level 1 - Full Code      Subjective:   Patient seen examined this morning  Patient noted episodes of difficulty breathing last night  Still with persistent dry cough and low-grade fever overnight  No nausea or vomiting  No dizziness or lightheadedness  No chest pain  Objective:     Vitals:   Temp (24hrs), Av 3 °F (37 4 °C), Min:98 4 °F (36 9 °C), Max:100 2 °F (37 9 °C)    Temp:  [98 4 °F (36 9 °C)-100 2 °F (37 9 °C)] 99 6 °F (37 6 °C)  HR:  [73-89] 73  Resp:  [18] 18  BP: ()/(50-75) 101/75  SpO2:  [87 %-94 %] 94 %  Body mass index is 26 41 kg/m²  Input and Output Summary (last 24 hours):     No intake or output data in the 24 hours ending 20 1326    Physical Exam:     Physical Exam   Constitutional: She is oriented to person, place, and time  No distress  HENT:   Head: Normocephalic  Mouth/Throat: Oropharynx is clear and moist    Eyes: Conjunctivae are normal  No scleral icterus  Neck: Normal range of motion  No JVD present  Cardiovascular: Normal rate, regular rhythm and normal heart sounds  Pulmonary/Chest: Effort normal  She has no wheezes  Decreased breath sounds bilateral   Abdominal: Soft  There is no tenderness  Musculoskeletal: Normal range of motion  She exhibits no edema  Neurological: She is alert and oriented to person, place, and time  She exhibits normal muscle tone  Skin: Skin is warm  No rash noted  She is not diaphoretic  No erythema  Psychiatric: She has a normal mood and affect  Her behavior is normal    Nursing note and vitals reviewed  Additional Data:     Labs:    Results from last 7 days   Lab Units 04/07/20  0505   WBC Thousand/uL 2 97*   HEMOGLOBIN g/dL 10 6*   HEMATOCRIT % 31 7*   PLATELETS Thousands/uL 139*   NEUTROS PCT % 46   LYMPHS PCT % 41   MONOS PCT % 13*   EOS PCT % 0     Results from last 7 days   Lab Units 04/07/20  0505 04/06/20  0710   POTASSIUM mmol/L 3 5 3 6   CHLORIDE mmol/L 105 99*   CO2 mmol/L 29 30   BUN mg/dL 5 11   CREATININE mg/dL 0 67 0 74   CALCIUM mg/dL 8 0* 8 8   ALK PHOS U/L  --  63   ALT U/L  --  25   AST U/L  --  19           * I Have Reviewed All Lab Data Listed Above  * Additional Pertinent Lab Tests Reviewed:  Shanna Lord Admission Reviewed    Imaging:    Imaging Reports Reviewed Today Include:  Chest x-ray  Imaging Personally Reviewed by Myself Includes:  Chest x-ray    Recent Cultures (last 7 days):           Last 24 Hours Medication List:     Current Facility-Administered Medications:  acetaminophen 975 mg Oral Q6H PRN Amado Jha MD    aspirin 81 mg Oral Daily Amado Jha MD    [START ON 4/8/2020] azithromycin 250 mg Oral Q24H Blank Rene MD    benzonatate 100 mg Oral TID PRN Alejandro Mcdonald PA-C    enoxaparin 40 mg Subcutaneous Daily Amado Jha MD    fenofibrate 48 mg Oral Daily Amado Jha MD    gabapentin 100 mg Oral TID Amado Jha MD    hydroxychloroquine 200 mg Oral BID Amado Jha MD    insulin lispro 1-5 Units Subcutaneous TID Bernard Montano MD    insulin lispro 1-5 Units Subcutaneous HS Amado Jha MD    pravastatin 40 mg Oral Daily With Darrius Gerber MD    sodium chloride 100 mL/hr Intravenous Continuous Amado Jha MD Last Rate: 100 mL/hr (04/06/20 1612)        Today, Patient Was Seen By: Yossi Chopra MD    ** Please Note: This note has been constructed using a voice recognition system   **

## 2020-04-07 NOTE — UTILIZATION REVIEW
Initial Clinical Review    4/6/2020 0905 Observation and changed 4/7/2020 1602 inpatient re:  Patient is COVID 19 positive, having intermittent hypoxia requiring oxygen, medication adjustment,  Continued IVF for poor intake  Start   Ordered   04/07/20 1602  Inpatient Admission Once     Transfer Service: General Medicine       Question Answer Comment   Admitting Physician Sunny Clark    Level of Care Med Surg    Estimated length of stay More than 2 Midnights    Certification I certify that inpatient services are medically necessary for this patient for a duration of greater than two midnights  See H&P and MD Progress Notes for additional information about the patient's course of treatment  04/07/20 1602       ED Arrival Information     Expected Arrival Acuity Means of Arrival Escorted By Service Admission Type    - 4/6/2020 06:44 Urgent Walk-In Self Hospitalist Urgent    Arrival Complaint    covid +  cough fever sob        Chief Complaint   Patient presents with    Shortness of Breath     Pt states that she tested positive for COVID - 19 a week ago and states that she thought she was getting better until last night where she ended up feeling short of breath and coughing more  Assessment/Plan:  this is a 44year old female from home to ED admitted to observation due to shortness of breath with COVID 19 virus detected  Tested positive for COVID 19 on 3/20/2029  Since last night feeling worse with back pain and fevers  On exam appears ill, has decreased breath sounds with poor inspiratory effort  Wbc 3 92  IN ED systolic BP < 619, sat to 90% and IVF bolus and oxygen  given  IVF, Plaquenil with telemetry, oxygen as needed for sat < 92%  In progress  Contact and airborne isolation  On 4/7/2020 changed to inpatient:  Patient with COVID 19 infection/dyspnea/hypoxia  Had sat overnight to 87% on room air, requiring oxygen, febrile to 100 2  Plaquenil continues, azithromycin added    To continue to monitor for fever and hypoxia  Has poor appetite and IVF contnue       ED Triage Vitals   Temperature Pulse Respirations Blood Pressure SpO2   04/06/20 0659 04/06/20 0659 04/06/20 0659 04/06/20 0659 04/06/20 0659   99 6 °F (37 6 °C) 93 16 120/66 97 %      Temp Source Heart Rate Source Patient Position - Orthostatic VS BP Location FiO2 (%)   04/06/20 1240 04/06/20 0659 04/06/20 0659 04/06/20 0659 --   Oral Monitor Lying Right arm       Pain Score       04/06/20 1155       No Pain        Wt Readings from Last 1 Encounters:   04/06/20 65 5 kg (144 lb 6 4 oz)     Additional Vital Signs:   04/07/20 0700  99 6 °F (37 6 °C)  73  18  101/75  --  93 %  None (Room air)  Lying   04/07/20 0500  98 4 °F (36 9 °C)  74  18  --  --  92 %  None (Room air)  --   04/06/20 2227  100 2 °F (37 9 °C)   89  18  90/55  --  92 %  None (Room air)  Lying      04/06/20 2146  --  --  --  --  --  87 %Abnormal   Nasal cannula 5 liters  --   04/06/20 1504  99 1 °F (37 3 °C)  83  18  94/50  --  94 %  None (Room air)  Sitting   04/06/20 1240  98 6 °F (37 °C)  81  20  100/50  --  91 %  None (Room air)         Pertinent Labs/Diagnostic Test Results:   4/6/2020 CxR - No acute cardiopulmonary disease    4/6/2020 EKG - Normal sinus rhythm  Normal ECG  When compared with ECG of 25-MAR-2019 22:04,  No significant change was found    Component Name  3/30/2020     Detected (HH)   SARS Coronavirus 2 PCR     Results from last 7 days   Lab Units 04/07/20  0505 04/06/20  0710   WBC Thousand/uL 2 97* 3 92*   HEMOGLOBIN g/dL 10 6* 12 9   HEMATOCRIT % 31 7* 37 7   PLATELETS Thousands/uL 139* 151   NEUTROS ABS Thousands/µL 1 35* 2 05     Results from last 7 days   Lab Units 04/07/20  0505 04/06/20  0710   SODIUM mmol/L 141 137   POTASSIUM mmol/L 3 5 3 6   CHLORIDE mmol/L 105 99*   CO2 mmol/L 29 30   ANION GAP mmol/L 7 8   BUN mg/dL 5 11   CREATININE mg/dL 0 67 0 74   EGFR ml/min/1 73sq m 102 94   CALCIUM mg/dL 8 0* 8 8     Results from last 7 days   Lab Units 04/06/20  0710   AST U/L 19   ALT U/L 25   ALK PHOS U/L 63   TOTAL PROTEIN g/dL 7 5   ALBUMIN g/dL 3 5   TOTAL BILIRUBIN mg/dL 0 48     Results from last 7 days   Lab Units 04/06/20  2106 04/06/20  1629   POC GLUCOSE mg/dl 154* 102     Results from last 7 days   Lab Units 04/07/20  0505 04/06/20  0710   GLUCOSE RANDOM mg/dL 143* 112     Results from last 7 days   Lab Units 04/06/20  0710   TROPONIN I ng/mL <0 02     Results from last 7 days   Lab Units 04/06/20  0710   LACTIC ACID mmol/L 0 8       ED Treatment:   Medication Administration from 04/06/2020 0643 to 04/06/2020 1233       Date/Time Order Dose Route Action Comments     04/06/2020 0715 sodium chloride 0 9 % bolus 500 mL 500 mL Intravenous New Bag         Past Medical History:   Diagnosis Date    Diabetes mellitus ([facility] [address] )     Lyme disease     treated 6 years ago - 1+ month medication     Present on Admission:   Shortness of breath   COVID-19 virus detected   Prediabetes   Hyperlipidemia      Admitting Diagnosis: SOB (shortness of breath) [R06 02]  Hypoxia [R09 02]  COVID-19 virus infection [U07 1]  Age/Sex: 44 y o  female  Admission Orders: 4/6/2020 0905 observation and changed 4/7/2020 1602 inpatient   Scheduled Medications:  Medications:  aspirin 81 mg Oral Daily   enoxaparin 40 mg Subcutaneous Daily   fenofibrate 48 mg Oral Daily   gabapentin 100 mg Oral TID   hydroxychloroquine 200 mg Oral BID   insulin lispro 1-5 Units Subcutaneous TID AC   insulin lispro 1-5 Units Subcutaneous HS   potassium chloride 40 mEq Oral Once   pravastatin 40 mg Oral Daily With Dinner   azithromycin (ZITHROMAX) tablet 500 mg   Dose: 500 mg  Freq: Every 24 hours Route: PO  Start: 04/07/20 1115 End: 04/07/20 1248    Continuous IV Infusions:  sodium chloride 100 mL/hr Intravenous Continuous     PRN Meds:  Acetaminophen - used x 1  975 mg Oral Q6H PRN   Benzonatate - used x 1  100 mg Oral TID PRN         Network Utilization Review Department  [email or web address]  ATTENTION: Please call with any questions or concerns to [phone] and carefully listen to the prompts so that you are directed to the right person  All voicemails are confidential   Lawson Canela all requests for admission clinical reviews, approved or denied determinations and any other requests to dedicated fax number below belonging to the campus where the patient is receiving treatment   List of dedicated fax numbers for the Facilities:  [address] DENIALS (Administrative/Medical Necessity) [phone]   [address] (Maternity/NICU/Pediatrics) [phone]   [facility] [phone]   Tung William [phone]   [facility] [phone]   [facility] [phone]   [address] [place] [address] [phone]   [facility]  [phone]   [address] [facility] [address]  [address] [facility] [address] [facility] [phone]

## 2020-04-07 NOTE — PLAN OF CARE
Problem: RESPIRATORY - ADULT  Goal: Achieves optimal ventilation and oxygenation  Description  INTERVENTIONS:  - Assess for changes in respiratory status  - Assess for changes in mentation and behavior  - Position to facilitate oxygenation and minimize respiratory effort  - Oxygen administered by appropriate delivery if ordered  - Initiate smoking cessation education as indicated  - Encourage broncho-pulmonary hygiene including cough, deep breathe, Incentive Spirometry  - Assess the need for suctioning and aspirate as needed  - Assess and instruct to report SOB or any respiratory difficulty  - Respiratory Therapy support as indicated  Outcome: Progressing     Problem: INFECTION - ADULT  Goal: Absence or prevention of progression during hospitalization  Description  INTERVENTIONS:  - Assess and monitor for signs and symptoms of infection  - Monitor lab/diagnostic results  - Monitor all insertion sites, i e  indwelling lines, tubes, and drains  - Monitor endotracheal if appropriate and nasal secretions for changes in amount and color  - [place] appropriate cooling/warming therapies per order  - Administer medications as ordered  - Instruct and encourage patient and family to use good hand hygiene technique  - Identify and instruct in appropriate isolation precautions for identified infection/condition  Outcome: Progressing     Problem: Nutrition/Hydration-ADULT  Goal: Nutrient/Hydration intake appropriate for improving, restoring or maintaining nutritional needs  Description  Monitor and assess patient's nutrition/hydration status for malnutrition  Collaborate with interdisciplinary team and initiate plan and interventions as ordered  Monitor patient's weight and dietary intake as ordered or per policy  Utilize nutrition screening tool and intervene as necessary  Determine patient's food preferences and provide high-protein, high-caloric foods as appropriate       INTERVENTIONS:  - Monitor oral intake, urinary output, labs, and treatment plans  - Assess nutrition and hydration status and recommend course of action  - Evaluate amount of meals eaten  - Assist patient with eating if necessary   - Allow adequate time for meals  - Recommend/ encourage appropriate diets, oral nutritional supplements, and vitamin/mineral supplements  - Order, calculate, and assess calorie counts as needed  - Recommend, monitor, and adjust tube feedings and TPN/PPN based on assessed needs  - Assess need for intravenous fluids  - Provide specific nutrition/hydration education as appropriate  - Include patient/family/caregiver in decisions related to nutrition  Outcome: Progressing

## 2020-04-07 NOTE — ASSESSMENT & PLAN NOTE
· Last A1c was was 6 2 in July 2019  Recheck A1c  · Start diabetic diet and ISS    · Hold home [place]

## 2020-04-07 NOTE — ASSESSMENT & PLAN NOTE
· In the setting of positive COVID-19 infection detected an outside facility on 3/30/20  Complaints of dry cough and progressive dyspnea  · Chest x-ray on admission showed no acute pulmonary process  Patient was saturating well on room air, initially 97% though had decreasing sats to 90%    · With complaints of worsening shortness of breath last night, with tip in O2 sats 87% however not requiring any O2 supplementation  · Continue with Plaquenil, was started on azithromycin  · Respiratory protocol  · Incentive spirometry  · Tessalon Perles t i d   · Supportive care

## 2020-04-07 NOTE — ASSESSMENT & PLAN NOTE
· Confirmed COVID-19 positive at outside facility on 3/30/20  Symptoms started approximately 7 days ago with myalgias and febrile episodes at home with T-max up to 101  With dry cough, myalgias, fatigue and weakness  Denies any nausea or vomiting  Denies any diarrhea  Reports decreased p o  Intake due to not having an appetite  · IV hydration with normal saline at 100 cc an hour  · 975 mg p o  Tylenol q 6 p r n  For fevers/headache/myalgias  · Zofran p r n  · Lance Marshall  · Was initiated on Plaquenil 600 mg q 12 hours for day 1, then 200 mg q 8 hours x8 doses, currently day 2  QTC of 404  · Will start on azithromycin 500 mg for day 1, 250 mg subsequent for days 2-5  · Will check for procalcitonin, if negative will discontinue antibiotic  · With noted low-grade fever last night    Trend fever curve  · Monitor CBC

## 2020-04-07 NOTE — PLAN OF CARE
Problem: RESPIRATORY - ADULT  Goal: Achieves optimal ventilation and oxygenation  Description  INTERVENTIONS:  - Assess for changes in respiratory status  - Assess for changes in mentation and behavior  - Position to facilitate oxygenation and minimize respiratory effort  - Oxygen administered by appropriate delivery if ordered  - Initiate smoking cessation education as indicated  - Encourage broncho-pulmonary hygiene including cough, deep breathe, Incentive Spirometry  - Assess the need for suctioning and aspirate as needed  - Assess and instruct to report SOB or any respiratory difficulty  - Respiratory Therapy support as indicated  Outcome: Progressing     Problem: INFECTION - ADULT  Goal: Absence or prevention of progression during hospitalization  Description  INTERVENTIONS:  - Assess and monitor for signs and symptoms of infection  - Monitor lab/diagnostic results  - Monitor all insertion sites, i e  indwelling lines, tubes, and drains  - Monitor endotracheal if appropriate and nasal secretions for changes in amount and color  - [place] appropriate cooling/warming therapies per order  - Administer medications as ordered  - Instruct and encourage patient and family to use good hand hygiene technique  - Identify and instruct in appropriate isolation precautions for identified infection/condition  Outcome: Progressing     Problem: Nutrition/Hydration-ADULT  Goal: Nutrient/Hydration intake appropriate for improving, restoring or maintaining nutritional needs  Description  Monitor and assess patient's nutrition/hydration status for malnutrition  Collaborate with interdisciplinary team and initiate plan and interventions as ordered  Monitor patient's weight and dietary intake as ordered or per policy  Utilize nutrition screening tool and intervene as necessary  Determine patient's food preferences and provide high-protein, high-caloric foods as appropriate       INTERVENTIONS:  - Monitor oral intake, urinary output, labs, and treatment plans  - Assess nutrition and hydration status and recommend course of action  - Evaluate amount of meals eaten  - Assist patient with eating if necessary   - Allow adequate time for meals  - Recommend/ encourage appropriate diets, oral nutritional supplements, and vitamin/mineral supplements  - Order, calculate, and assess calorie counts as needed  - Recommend, monitor, and adjust tube feedings and TPN/PPN based on assessed needs  - Assess need for intravenous fluids  - Provide specific nutrition/hydration education as appropriate  - Include patient/family/caregiver in decisions related to nutrition  Outcome: Progressing

## 2020-04-08 LAB
ANION GAP SERPL CALCULATED.3IONS-SCNC: 8 MMOL/L (ref 4–13)
ATRIAL RATE: 93 BPM
BUN SERPL-MCNC: 4 MG/DL (ref 5–25)
CALCIUM SERPL-MCNC: 8 MG/DL (ref 8.3–10.1)
CHLORIDE SERPL-SCNC: 104 MMOL/L (ref 100–108)
CO2 SERPL-SCNC: 26 MMOL/L (ref 21–32)
CREAT SERPL-MCNC: 0.53 MG/DL (ref 0.6–1.3)
ERYTHROCYTE [DISTWIDTH] IN BLOOD BY AUTOMATED COUNT: 11.9 % (ref 11.6–15.1)
GFR SERPL CREATININE-BSD FRML MDRD: 110 ML/MIN/1.73SQ M
GLUCOSE SERPL-MCNC: 111 MG/DL (ref 65–140)
GLUCOSE SERPL-MCNC: 113 MG/DL (ref 65–140)
GLUCOSE SERPL-MCNC: 115 MG/DL (ref 65–140)
GLUCOSE SERPL-MCNC: 123 MG/DL (ref 65–140)
GLUCOSE SERPL-MCNC: 125 MG/DL (ref 65–140)
HCT VFR BLD AUTO: 31.3 % (ref 34.8–46.1)
HGB BLD-MCNC: 10.4 G/DL (ref 11.5–15.4)
MCH RBC QN AUTO: 29.9 PG (ref 26.8–34.3)
MCHC RBC AUTO-ENTMCNC: 33.2 G/DL (ref 31.4–37.4)
MCV RBC AUTO: 90 FL (ref 82–98)
P AXIS: 72 DEGREES
PLATELET # BLD AUTO: 178 THOUSANDS/UL (ref 149–390)
PMV BLD AUTO: 11.4 FL (ref 8.9–12.7)
POTASSIUM SERPL-SCNC: 3.6 MMOL/L (ref 3.5–5.3)
PR INTERVAL: 138 MS
QRS AXIS: 43 DEGREES
QRSD INTERVAL: 74 MS
QT INTERVAL: 340 MS
QTC INTERVAL: 422 MS
RBC # BLD AUTO: 3.48 MILLION/UL (ref 3.81–5.12)
SODIUM SERPL-SCNC: 138 MMOL/L (ref 136–145)
T WAVE AXIS: 54 DEGREES
VENTRICULAR RATE: 93 BPM
WBC # BLD AUTO: 4.08 THOUSAND/UL (ref 4.31–10.16)

## 2020-04-08 PROCEDURE — 80048 BASIC METABOLIC PNL TOTAL CA: CPT | Performed by: INTERNAL MEDICINE

## 2020-04-08 PROCEDURE — 99232 SBSQ HOSP IP/OBS MODERATE 35: CPT | Performed by: INTERNAL MEDICINE

## 2020-04-08 PROCEDURE — 93010 ELECTROCARDIOGRAM REPORT: CPT | Performed by: INTERNAL MEDICINE

## 2020-04-08 PROCEDURE — 93005 ELECTROCARDIOGRAM TRACING: CPT

## 2020-04-08 PROCEDURE — 82948 REAGENT STRIP/BLOOD GLUCOSE: CPT

## 2020-04-08 PROCEDURE — 85027 COMPLETE CBC AUTOMATED: CPT | Performed by: INTERNAL MEDICINE

## 2020-04-08 RX ORDER — ONDANSETRON 2 MG/ML
4 INJECTION INTRAMUSCULAR; INTRAVENOUS EVERY 6 HOURS PRN
Status: DISCONTINUED | OUTPATIENT
Start: 2020-04-08 | End: 2020-04-12 | Stop reason: HOSPADM

## 2020-04-08 RX ORDER — GUAIFENESIN/DEXTROMETHORPHAN 100-10MG/5
10 SYRUP ORAL EVERY 4 HOURS PRN
Status: DISCONTINUED | OUTPATIENT
Start: 2020-04-08 | End: 2020-04-10

## 2020-04-08 RX ORDER — SODIUM CHLORIDE 9 MG/ML
50 INJECTION, SOLUTION INTRAVENOUS ONCE
Status: COMPLETED | OUTPATIENT
Start: 2020-04-08 | End: 2020-04-08

## 2020-04-08 RX ORDER — BENZONATATE 100 MG/1
100 CAPSULE ORAL 3 TIMES DAILY PRN
Status: DISCONTINUED | OUTPATIENT
Start: 2020-04-08 | End: 2020-04-10

## 2020-04-08 RX ADMIN — ENOXAPARIN SODIUM 40 MG: 40 INJECTION SUBCUTANEOUS at 09:40

## 2020-04-08 RX ADMIN — HYDROXYCHLOROQUINE SULFATE 200 MG: 200 TABLET, FILM COATED ORAL at 09:40

## 2020-04-08 RX ADMIN — BENZONATATE 100 MG: 100 CAPSULE ORAL at 18:05

## 2020-04-08 RX ADMIN — ONDANSETRON 4 MG: 2 INJECTION INTRAMUSCULAR; INTRAVENOUS at 10:57

## 2020-04-08 RX ADMIN — GUAIFENESIN AND DEXTROMETHORPHAN 10 ML: 100; 10 SYRUP ORAL at 15:46

## 2020-04-08 RX ADMIN — GABAPENTIN 100 MG: 100 CAPSULE ORAL at 17:09

## 2020-04-08 RX ADMIN — HYDROXYCHLOROQUINE SULFATE 200 MG: 200 TABLET, FILM COATED ORAL at 17:09

## 2020-04-08 RX ADMIN — PRAVASTATIN SODIUM 40 MG: 40 TABLET ORAL at 17:09

## 2020-04-08 RX ADMIN — GABAPENTIN 100 MG: 100 CAPSULE ORAL at 21:32

## 2020-04-08 RX ADMIN — SODIUM CHLORIDE 50 ML/HR: 0.9 INJECTION, SOLUTION INTRAVENOUS at 21:32

## 2020-04-08 RX ADMIN — GUAIFENESIN AND DEXTROMETHORPHAN 10 ML: 100; 10 SYRUP ORAL at 20:18

## 2020-04-08 RX ADMIN — GABAPENTIN 100 MG: 100 CAPSULE ORAL at 09:40

## 2020-04-08 RX ADMIN — ASPIRIN 81 MG 81 MG: 81 TABLET ORAL at 09:40

## 2020-04-08 RX ADMIN — FENOFIBRATE 48 MG: 48 TABLET, FILM COATED ORAL at 09:40

## 2020-04-08 RX ADMIN — AZITHROMYCIN 250 MG: 250 TABLET, FILM COATED ORAL at 11:05

## 2020-04-08 NOTE — PROGRESS NOTES
Pt checked after being on RA for 20 minutes  Resting on RA 94%  Ambulating in Room on RA: steady 93%   Pulm outside of room  Made aware

## 2020-04-08 NOTE — ASSESSMENT & PLAN NOTE
· In the setting of positive COVID-19 infection detected at outside facility on 3/30/20  Complaints of dry cough and progressive dyspnea  · Chest x-ray on admission showed no acute pulmonary process  Patient was saturating well on room air, initially 97% though had decreasing sats to 90%    · Currently on 2 L O2 NC, saturating at 94-97%  · Continue with Plaquenil and azithromycin  · Respiratory protocol  · Incentive spirometry  · Tessalon Perles t i d   · Supportive care  · See above problem for further management

## 2020-04-08 NOTE — PROGRESS NOTES
COVID Consult Note- Pulmonary-Critical Care   Yolanda Epperson 44 y o  female MRN: [id number]  Unit/Bed#: S -01 Encounter: [id number]    Code status: Level 1 - Full Code    COVID-19: Positive (3/30/20)    Date Symptom Onset:  Approximately 1 week ago     Hospital Day: 2     Symptom complex: (Fever, Cough, Dyspnea)    Comorbid Conditions:  Diabetes mellitus type 2    24hr events:  Patient admitted yesterday with a 1 week history of cough, fever and shortness of breath  She had positive COVID testing performed on 3/30/20  Patient had significant exposure at her workplace at [address] [place] [address]  Critical care was consulted earlier this evening due to patient being noted to have worsening oxygenation  Room air saturations on admission were 97%  Room air saturations earlier this evening were 91%  She is now on 2 L nasal cannula and saturations are 96%  Patient continues to cough  There is no sputum production  She has no prior pulmonary issues  Assessment:     1  Positive COVID-19 Infection - (Mild Stage) - Present on admission     2  Acute Respiratory insufficiency     Recommendations:     - Continue Plaquenil and azithromycin  - Titrate O2 to maintain saturations above 92%  - Encourage incentive spirometry  - Self-proning education materials were provided to the patient  - If oxygen needs increase above 3 liters/minute, check inflammatory markers to help guide additional treatment regimen  - Pulmonary service will continue to follow along    If oxygen needs escalate, contact in-house critical care provider    Vitals   Vitals:    20 0800 20 1248 20 1629 20 2120   BP:   108/58 98/78   BP Location:   Left arm Left arm   Pulse:   91 86   Resp:   18 18   Temp:   99 2 °F (37 3 °C) 99 3 °F (37 4 °C)   TempSrc:   Oral Oral   SpO2: 93% 94% 92% 91%   Weight:       Height:         Temp (24hrs), Av 1 °F (37 3 °C), Min:98 4 °F (36 9 °C), Max:99 6 °F (37 6 °C)  Current: Temperature: 99 3 °F (37 4 °C)      Respiratory:  SpO2: SpO2: 91 %  O2 Flow Rate (L/min): 5 L/min  Repeat testing 96% on 2 liters/minute    Physical Exam:   Brief Exam:  Patient is awake and alert  There is no accessory muscle use  She is not in any distress  There is no cyanosis, clubbing or edema  No wheezing  Laboratory and Diagnostics  Results from last 7 days   Lab Units 04/07/20  0505 04/06/20  0710   WBC Thousand/uL 2 97* 3 92*   HEMOGLOBIN g/dL 10 6* 12 9   HEMATOCRIT % 31 7* 37 7   PLATELETS Thousands/uL 139* 151   NEUTROS PCT % 46 52   MONOS PCT % 13* 13*     Results from last 7 days   Lab Units 04/07/20  0505 04/06/20  0710   SODIUM mmol/L 141 137   POTASSIUM mmol/L 3 5 3 6   CHLORIDE mmol/L 105 99*   CO2 mmol/L 29 30   ANION GAP mmol/L 7 8   BUN mg/dL 5 11   CREATININE mg/dL 0 67 0 74   CALCIUM mg/dL 8 0* 8 8   GLUCOSE RANDOM mg/dL 143* 112   ALT U/L  --  25   AST U/L  --  19   ALK PHOS U/L  --  63   ALBUMIN g/dL  --  3 5   TOTAL BILIRUBIN mg/dL  --  0 48               Results from last 7 days   Lab Units 04/06/20  0710   TROPONIN I ng/mL <0 02     Invalid input(s): CPK  @LABCRNTIP(BNP:7)@  Results from last 7 days   Lab Units 04/06/20  0710   LACTIC ACID mmol/L 0 8                     Results from last 7 days   Lab Units 04/07/20  1157   PROCALCITONIN ng/ml <0 05     Diagnostic Imaging   Radiographs:  Chest x-ray I have personally reviewed pertinent reports  and I have personally reviewed pertinent films in PACS chest x-ray from yesterday is clear    Keanu Bautista DO, [facility]  [facility]    Portions of the record may have been created with voice recognition software  Occasional wrong word or "sound a like" substitutions may have occurred due to the inherent limitations of voice recognition software  Read the chart carefully and recognize, using context, where substitutions have occurred

## 2020-04-08 NOTE — ASSESSMENT & PLAN NOTE
· POA  Confirmed COVID-19 positive at outside facility on 3/30/20  Symptoms started approximately 7 days ago with myalgias and febrile episodes at home with T-max up to 101  With dry cough, myalgias, fatigue and weakness  Denies any nausea or vomiting  Denies any diarrhea  Reports decreased p o  Intake due to not having an appetite  · Was noted to have increase O2 requirements last night, patient states that she was having difficulty breathing and still with persistent cough  Patient needs to be on O2 supplementation via NC  · Currently on 2 L N/C, with saturations of 94-97%  · 975 mg p o  Tylenol q 6 p r n  For fevers/headache/myalgias  · Zofran p r n    · Tessalon Perles  · Procalcitonin normal  · Pulmonary on board, appreciate recommendations  · If O2 requirements continues to increase, or if with symptomatic decline, if needing >3L would check for inflammatory markers  · Continue with Plaquenil and azithromycin  · Continue to titrate O2 to keep sats above 92%  · Continue to monitor fever curve  · Monitor CBC

## 2020-04-08 NOTE — PROGRESS NOTES
Progress Note - Carlos Vines 1970, 44 y o  female MRN: [id number]    Unit/Bed#: S -01 Encounter: [id number]    Primary Care Provider: Pattie Arora MD   Date and time admitted to hospital: 4/6/2020  6:57 AM      * COVID-19 virus detected  Assessment & Plan  · POA  Confirmed COVID-19 positive at outside facility on 3/30/20  Symptoms started approximately 7 days ago with myalgias and febrile episodes at home with T-max up to 101  With dry cough, myalgias, fatigue and weakness  Denies any nausea or vomiting  Denies any diarrhea  Reports decreased p o  Intake due to not having an appetite  · Was noted to have increase O2 requirements last night, patient states that she was having difficulty breathing and still with persistent cough  Patient needs to be on O2 supplementation via NC  · Currently on 2 L N/C, with saturations of 94-97%  · 975 mg p o  Tylenol q 6 p r n  For fevers/headache/myalgias  · Zofran p r n  · Tessalon Perles  · Procalcitonin normal  · Pulmonary on board, appreciate recommendations  · If O2 requirements continues to increase, or if with symptomatic decline, if needing >3L would check for inflammatory markers  · Continue with Plaquenil and azithromycin  · Continue to titrate O2 to keep sats above 92%  · Continue to monitor fever curve  · Monitor CBC    Shortness of breath  Assessment & Plan  · In the setting of positive COVID-19 infection detected at outside facility on 3/30/20  Complaints of dry cough and progressive dyspnea  · Chest x-ray on admission showed no acute pulmonary process  Patient was saturating well on room air, initially 97% though had decreasing sats to 90%    · Currently on 2 L O2 NC, saturating at 94-97%  · Continue with Plaquenil and azithromycin  · Respiratory protocol  · Incentive spirometry  · Tessalon Perles t i d   · Supportive care  · See above problem for further management    Hyperlipidemia  Assessment & Plan  · Resume home pravastatin and fenofibrate    Prediabetes  Assessment & Plan  · Last A1c was was 6 2 in 2019  Recheck A1c  · Start diabetic diet and ISS  · Hold home Synjardy      VTE Pharmacologic Prophylaxis:   Pharmacologic: Enoxaparin (Lovenox)  Mechanical VTE Prophylaxis in Place: Yes    Discussions with Specialists or Other Care Team Provider:  Discussed with the nurse, attending, pulmonology on board    Education and Discussions with Family / Patient:  Discussed with patient, offered to call family however patient stated patient stated that she will call them    Current Length of Stay: 1 day(s)    Current Patient Status: Inpatient     Discharge Plan / Estimated Discharge Date: To be determined based on clinical course    Code Status: Level 1 - Full Code      Subjective:   Patient states that she had some trouble with her breathing last night  She took complaining of persistent cough  Feels a lot better with the O2 supplementation  Denies chest pain  She states she is able to walk around the room  Complaining of a little bit of nausea  Still with decreased appetite  No other complaints noted  Objective:     Vitals:   Temp (24hrs), Av 4 °F (37 4 °C), Min:99 2 °F (37 3 °C), Max:99 8 °F (37 7 °C)    Temp:  [99 2 °F (37 3 °C)-99 8 °F (37 7 °C)] 99 8 °F (37 7 °C)  HR:  [86-91] 87  Resp:  [18] 18  BP: ()/(50-78) 90/50  SpO2:  [91 %-97 %] 94 %  Body mass index is 26 41 kg/m²  Input and Output Summary (last 24 hours): Intake/Output Summary (Last 24 hours) at 2020 1051  Last data filed at 2020 0941  Gross per 24 hour   Intake 4148 33 ml   Output --   Net 4148 33 ml       Physical Exam:     Physical Exam   Constitutional: She is oriented to person, place, and time  No distress  Lying comfortably in bed, on O2 supplementation   HENT:   Head: Normocephalic and atraumatic  Eyes: No scleral icterus  Neck: Normal range of motion  No JVD present     Cardiovascular:   No JVD, no lower extremity edema Pulmonary/Chest: Effort normal  No respiratory distress  No audible wheezing   Abdominal: She exhibits no distension  Musculoskeletal: Normal range of motion  She exhibits no edema  Neurological: She is alert and oriented to person, place, and time  No facial asymmetry, speech is clear   Skin: No rash noted  No erythema  Psychiatric: She has a normal mood and affect  Her behavior is normal  Thought content normal    Nursing note and vitals reviewed  Additional Data:     Labs:    Results from last 7 days   Lab Units 04/08/20  0541 04/07/20  0505   WBC Thousand/uL 4 08* 2 97*   HEMOGLOBIN g/dL 10 4* 10 6*   HEMATOCRIT % 31 3* 31 7*   PLATELETS Thousands/uL 178 139*   NEUTROS PCT %  --  46   LYMPHS PCT %  --  41   MONOS PCT %  --  13*   EOS PCT %  --  0     Results from last 7 days   Lab Units 04/08/20  0541  04/06/20  0710   POTASSIUM mmol/L 3 6   < > 3 6   CHLORIDE mmol/L 104   < > 99*   CO2 mmol/L 26   < > 30   BUN mg/dL 4*   < > 11   CREATININE mg/dL 0 53*   < > 0 74   CALCIUM mg/dL 8 0*   < > 8 8   ALK PHOS U/L  --   --  63   ALT U/L  --   --  25   AST U/L  --   --  19    < > = values in this interval not displayed  * I Have Reviewed All Lab Data Listed Above  * Additional Pertinent Lab Tests Reviewed:  Shanna 64 Admission Reviewed    Imaging:    Imaging Reports Reviewed Today Include: NA  Imaging Personally Reviewed by Myself Includes:  NA    Recent Cultures (last 7 days):           Last 24 Hours Medication List:     Current Facility-Administered Medications:  acetaminophen 975 mg Oral Q6H PRN Connie Pate MD   aspirin 81 mg Oral Daily Connie Pate MD   azithromycin 250 mg Oral Q24H Major Sullivan MD   benzonatate 100 mg Oral TID PRN Armida Haney PA-C   enoxaparin 40 mg Subcutaneous Daily Connie Pate MD   fenofibrate 48 mg Oral Daily Connie Pate MD   gabapentin 100 mg Oral TID Connie Pate MD   hydroxychloroquine 200 mg Oral BID [facility], MD   insulin lispro 1-5 Units Subcutaneous TID AC Arvind Arreaga MD   insulin lispro 1-5 Units Subcutaneous HS Arvind Arreaga MD   ondansetron 4 mg Intravenous Q6H PRN Karen Figueroa MD   pravastatin 40 mg Oral Daily With Jeremiah Mccoy MD        Today, Patient Was Seen By: Karen Figueroa MD    ** Please Note: This note has been constructed using a voice recognition system   **

## 2020-04-08 NOTE — DISCHARGE INSTR - AVS FIRST PAGE
Dear Kennedy Stephenson,     It was our pleasure to care for you here at [facility]  It is our hope that we were always able to exceed the expected standards for your care during your stay  You were hospitalized due to COVID-19 infection  You were cared for on the 4th floor by Allie Phillips MD under the service of Jose Manuel Jackson MD with the [facility] who covers for your primary care physician (PCP), Eber Rivera MD, while you were hospitalized  If you have any questions or concerns related to this hospitalization, you may contact us at [phone]  For follow up as well as any medication refills, we recommend that you follow up with your primary care physician  A registered nurse will reach out to you by phone within a few days after your discharge to answer any additional questions that you may have after going home  However, at this time we provide for you here, the most important instructions / recommendations at discharge:     · Notable Medication Adjustments -   · You can continue taking Robitussin and Tessalon perles as needed for cough  · Testing Required after Discharge -   · None, follow up with PCP for outpatient blood work  · Important follow up information -   · Follow up with your PCP  · Other Instructions -   · Please follow instructions/guidelines attached for COVID-19 per CDC guidelines  · Please see attached information regarding plasma/blood donation once you are symptom free for 28 days  · Please review this entire after visit summary as additional general instructions including medication list, appointments, activity, diet, any pertinent wound care, and other additional recommendations from your care team that may be provided for you        Sincerely,     Allie Phillips MD     COVID-19 Home Care Guidelines    Your healthcare provider and/or public health staff have evaluated you and have determined that you do not need to remain in the hospital at this time  At this time you can be isolated at home where you will be monitored by staff from your local or state health department  You should carefully follow the prevention and isolation steps below until a healthcare provider or local or state health department says that you can return to your normal activities  Stay home except to get medical care    People who are mildly ill with COVID-19 are able to isolate at home during their illness  You should restrict activities outside your home, except for getting medical care  Do not go to work, school, or public areas  Avoid using public transportation, ride-sharing, or taxis  Separate yourself from other people and animals in your home    People: As much as possible, you should stay in a specific room and away from other people in your home  Also, you should use a separate bathroom, if available  Animals: You should restrict contact with pets and other animals while you are sick with COVID-19, just like you would around other people  Although there have not been reports of pets or other animals becoming sick with COVID-19, it is still recommended that people sick with COVID-19 limit contact with animals until more information is known about the virus  When possible, have another member of your household care for your animals while you are sick  If you are sick with COVID-19, avoid contact with your pet, including petting, snuggling, being kissed or licked, and sharing food  If you must care for your pet or be around animals while you are sick, wash your hands before and after you interact with pets and wear a facemask  See COVID-19 and Animals for more information  Call ahead before visiting your doctor    If you have a medical appointment, call the healthcare provider and tell them that you have or may have COVID-19   This will help the healthcare providers office take steps to keep other people from getting infected or exposed  Wear a facemask    You should wear a facemask when you are around other people (e g , sharing a room or vehicle) or pets and before you enter a healthcare providers office  If you are not able to wear a facemask (for example, because it causes trouble breathing), then people who live with you should not stay in the same room with you, or they should wear a facemask if they enter your room  Cover your coughs and sneezes    Cover your mouth and nose with a tissue when you cough or sneeze  Throw used tissues in a lined trash can  Immediately wash your hands with soap and water for at least 20 seconds or, if soap and water are not available, clean your hands with an alcohol-based hand  that contains at least 60% alcohol  Clean your hands often    Wash your hands often with soap and water for at least 20 seconds, especially after blowing your nose, coughing, or sneezing; going to the bathroom; and before eating or preparing food  If soap and water are not readily available, use an alcohol-based hand  with at least 60% alcohol, covering all surfaces of your hands and rubbing them together until they feel dry  Soap and water are the best option if hands are visibly dirty  Avoid touching your eyes, nose, and mouth with unwashed hands  Avoid sharing personal household items    You should not share dishes, drinking glasses, cups, eating utensils, towels, or bedding with other people or pets in your home  After using these items, they should be washed thoroughly with soap and water  Clean all high-touch surfaces everyday    High touch surfaces include counters, tabletops, doorknobs, bathroom fixtures, toilets, phones, keyboards, tablets, and bedside tables  Also, clean any surfaces that may have blood, stool, or body fluids on them  Use a household cleaning spray or wipe, according to the label instructions   Labels contain instructions for safe and effective use of the cleaning product including precautions you should take when applying the product, such as wearing gloves and making sure you have good ventilation during use of the product  Monitor your symptoms    Seek prompt medical attention if your illness is worsening (e g , difficulty breathing)  Before seeking care, call your healthcare provider and tell them that you have, or are being evaluated for, COVID-19  Put on a facemask before you enter the facility  These steps will help the healthcare providers office to keep other people in the office or waiting room from getting infected or exposed  Ask your healthcare provider to call the local or [facility] health department  Persons who are placed under active monitoring or facilitated self-monitoring should follow instructions provided by their local health department or occupational health professionals, as appropriate  If you have a medical emergency and need to call 911, notify the dispatch personnel that you have, or are being evaluated for COVID-19  If possible, put on a facemask before emergency medical services arrive  Discontinuing home isolation    Patients with confirmed COVID-19 should remain under home isolation precautions until the following conditions are met:   - They have had no fever for at least 72 hours (that is three full days of no fever without the use medicine that reduces fevers)  AND  - other symptoms have improved (for example, when their cough or shortness of breath have improved)  AND  - at least 7 days have passed since their symptoms first appeared  Patients with confirmed COVID-19 should also notify close contacts (including their workplace) and ask that they self-quarantine  Currently, close contact is defined as being within 6 feet for 10 minutes or more from the period 48 hours before symptom onset to the time at which the patient went into isolation    Close contacts of patients diagnosed with COVID-19 should be instructed by the patient to self-quarantine for 14 days from the last time of their last contact with the patient  Source: [employer]    You were treated for Novel Coronavirus (COVID-19)  Once you have fully recovered from COVID-19, you may be able to help patients currently fighting the infection by donating plasma  This is the same process as donating blood  There are case reports about treating COVID-19 patients with convalescent plasma  This treatment is experimental, but doctors hope it will be lifesaving for many seriously ill patients  When a person becomes ill with COVID-19, it can take a patient time to develop antibodies needed to combat the disease  While some patients can become seriously ill and require the assistance of a ventilator, 80% of those diagnosed do not become seriously ill  These patients recover and have antibodies in their plasma  By collecting this plasma and giving it to ill patients we hope we can provide a boost to the patient's antibodies and help stimulate recovery  This is particularly important in patients who have a suppressed immune system  If you are willing to be a donor, please visit [facility] [place]'s Website at [email or web address]  or call [facility] at [phone] Ext  [phone]  You may also contact Misti Chery at [phone] for additional details

## 2020-04-08 NOTE — ASSESSMENT & PLAN NOTE
· Last A1c was was 6 2 in July 2019  Recheck A1c  · Start diabetic diet and ISS    · Hold home [place]

## 2020-04-08 NOTE — PROGRESS NOTES
Progress Note - Pulmonary   Yolanda Nicole 44 y o  female MRN: [id number]  Unit/Bed#: S -01 Encounter: [id number]      Assessment:  COVID 19 infection-positive on March 30, 2020  Respiratory distress secondary to above  Hyperlipidemia  Pre diabetes    Recommendations  Agree with continuation of Plaquenil and azithromycin ()  Supplemental oxygen as needed to maintain sats greater than 90%  Continue DVT prophylaxis  Would encourage self proning/positional therapy especially at night (desatted last night and required 2L supplemental 02)  Will check inflammatory markers if she has escalating O2 requirement  Zofran for nausea  Tessalon Perles  Have ordered Robitussin which may not be effective but worth trying     Subjective:   Has persistent nonproductive cough  Also reports nausea and decreased appetite    Objective:   Taken off oxygen today  On room air resting saturations are 94%  Ambulatory sats 93%  Vitals: Blood pressure 90/50, pulse 87, temperature 99 8 °F (37 7 °C), temperature source Oral, resp  rate 18, height 5' 2" (1 575 m), weight 65 5 kg (144 lb 6 4 oz), SpO2 95 %  , 2L, Body mass index is 26 41 kg/m²  Intake/Output Summary (Last 24 hours) at 4/8/2020 1446  Last data filed at 4/8/2020 0941  Gross per 24 hour   Intake 4148 33 ml   Output --   Net 4148 33 ml       Physical Exam full exam deferred due to pandemic response  Discussed exam with nurse and reviewed chart  Gen: Awake, alert, oriented x 3, no acute distress  Some conversational dyspnea, actively coughing throughout assessment     Labs: I have personally reviewed pertinent lab results    Results from last 7 days   Lab Units 04/08/20  0541 04/07/20  0505 04/06/20  0710   WBC Thousand/uL 4 08* 2 97* 3 92*   HEMOGLOBIN g/dL 10 4* 10 6* 12 9   HEMATOCRIT % 31 3* 31 7* 37 7   PLATELETS Thousands/uL 178 139* 151   NEUTROS PCT %  --  46 52   MONOS PCT %  --  13* 13*      Results from last 7 days   Lab Units 04/08/20  0541 04/07/20  0505 04/06/20  0710   POTASSIUM mmol/L 3 6 3 5 3 6   CHLORIDE mmol/L 104 105 99*   CO2 mmol/L 26 29 30   BUN mg/dL 4* 5 11   CREATININE mg/dL 0 53* 0 67 0 74   CALCIUM mg/dL 8 0* 8 0* 8 8   ALK PHOS U/L  --   --  63   ALT U/L  --   --  25   AST U/L  --   --  19                  Results from last 7 days   Lab Units 04/06/20  0710   LACTIC ACID mmol/L 0 8     0   Lab Value Date/Time    TROPONINI <0 02 04/06/2020 0710    TROPONINI <0 02 03/25/2019 2206    TROPONINI <0 02 03/25/2019 1814    TROPONINI <0 02 03/25/2019 1453     Microbiology:  COVID-19 positive     Imaging and other studies: I have personally reviewed pertinent reports  CXR 4/6/2020    No acute cardiopulmonary disease    Radha Gloria MD  [facility]

## 2020-04-08 NOTE — PLAN OF CARE
Problem: RESPIRATORY - ADULT  Goal: Achieves optimal ventilation and oxygenation  Description  INTERVENTIONS:  - Assess for changes in respiratory status  - Assess for changes in mentation and behavior  - Position to facilitate oxygenation and minimize respiratory effort  - Oxygen administered by appropriate delivery if ordered  - Initiate smoking cessation education as indicated  - Encourage broncho-pulmonary hygiene including cough, deep breathe, Incentive Spirometry  - Assess the need for suctioning and aspirate as needed  - Assess and instruct to report SOB or any respiratory difficulty  - Respiratory Therapy support as indicated  Outcome: Progressing     Problem: INFECTION - ADULT  Goal: Absence or prevention of progression during hospitalization  Description  INTERVENTIONS:  - Assess and monitor for signs and symptoms of infection  - Monitor lab/diagnostic results  - Monitor all insertion sites, i e  indwelling lines, tubes, and drains  - Monitor endotracheal if appropriate and nasal secretions for changes in amount and color  - [place] appropriate cooling/warming therapies per order  - Administer medications as ordered  - Instruct and encourage patient and family to use good hand hygiene technique  - Identify and instruct in appropriate isolation precautions for identified infection/condition  Outcome: Progressing     Problem: Nutrition/Hydration-ADULT  Goal: Nutrient/Hydration intake appropriate for improving, restoring or maintaining nutritional needs  Description  Monitor and assess patient's nutrition/hydration status for malnutrition  Collaborate with interdisciplinary team and initiate plan and interventions as ordered  Monitor patient's weight and dietary intake as ordered or per policy  Utilize nutrition screening tool and intervene as necessary  Determine patient's food preferences and provide high-protein, high-caloric foods as appropriate       INTERVENTIONS:  - Monitor oral intake, urinary output, labs, and treatment plans  - Assess nutrition and hydration status and recommend course of action  - Evaluate amount of meals eaten  - Assist patient with eating if necessary   - Allow adequate time for meals  - Recommend/ encourage appropriate diets, oral nutritional supplements, and vitamin/mineral supplements  - Order, calculate, and assess calorie counts as needed  - Recommend, monitor, and adjust tube feedings and TPN/PPN based on assessed needs  - Assess need for intravenous fluids  - Provide specific nutrition/hydration education as appropriate  - Include patient/family/caregiver in decisions related to nutrition  Outcome: Progressing

## 2020-04-09 LAB
ANION GAP SERPL CALCULATED.3IONS-SCNC: 9 MMOL/L (ref 4–13)
BUN SERPL-MCNC: 5 MG/DL (ref 5–25)
CALCIUM SERPL-MCNC: 8.4 MG/DL (ref 8.3–10.1)
CHLORIDE SERPL-SCNC: 103 MMOL/L (ref 100–108)
CO2 SERPL-SCNC: 26 MMOL/L (ref 21–32)
CREAT SERPL-MCNC: 0.64 MG/DL (ref 0.6–1.3)
ERYTHROCYTE [DISTWIDTH] IN BLOOD BY AUTOMATED COUNT: 11.9 % (ref 11.6–15.1)
GFR SERPL CREATININE-BSD FRML MDRD: 104 ML/MIN/1.73SQ M
GLUCOSE SERPL-MCNC: 102 MG/DL (ref 65–140)
GLUCOSE SERPL-MCNC: 117 MG/DL (ref 65–140)
GLUCOSE SERPL-MCNC: 133 MG/DL (ref 65–140)
GLUCOSE SERPL-MCNC: 94 MG/DL (ref 65–140)
GLUCOSE SERPL-MCNC: 95 MG/DL (ref 65–140)
HCT VFR BLD AUTO: 32.3 % (ref 34.8–46.1)
HGB BLD-MCNC: 10.8 G/DL (ref 11.5–15.4)
MCH RBC QN AUTO: 30.8 PG (ref 26.8–34.3)
MCHC RBC AUTO-ENTMCNC: 33.4 G/DL (ref 31.4–37.4)
MCV RBC AUTO: 92 FL (ref 82–98)
PLATELET # BLD AUTO: 229 THOUSANDS/UL (ref 149–390)
PMV BLD AUTO: 11.2 FL (ref 8.9–12.7)
POTASSIUM SERPL-SCNC: 3.7 MMOL/L (ref 3.5–5.3)
RBC # BLD AUTO: 3.51 MILLION/UL (ref 3.81–5.12)
SODIUM SERPL-SCNC: 138 MMOL/L (ref 136–145)
WBC # BLD AUTO: 5.19 THOUSAND/UL (ref 4.31–10.16)

## 2020-04-09 PROCEDURE — 85027 COMPLETE CBC AUTOMATED: CPT | Performed by: INTERNAL MEDICINE

## 2020-04-09 PROCEDURE — 82948 REAGENT STRIP/BLOOD GLUCOSE: CPT

## 2020-04-09 PROCEDURE — 99232 SBSQ HOSP IP/OBS MODERATE 35: CPT | Performed by: INTERNAL MEDICINE

## 2020-04-09 PROCEDURE — 80048 BASIC METABOLIC PNL TOTAL CA: CPT | Performed by: INTERNAL MEDICINE

## 2020-04-09 RX ADMIN — PRAVASTATIN SODIUM 40 MG: 40 TABLET ORAL at 17:39

## 2020-04-09 RX ADMIN — ASPIRIN 81 MG 81 MG: 81 TABLET ORAL at 09:02

## 2020-04-09 RX ADMIN — FENOFIBRATE 48 MG: 48 TABLET, FILM COATED ORAL at 09:02

## 2020-04-09 RX ADMIN — AZITHROMYCIN 250 MG: 250 TABLET, FILM COATED ORAL at 12:14

## 2020-04-09 RX ADMIN — HYDROXYCHLOROQUINE SULFATE 200 MG: 200 TABLET, FILM COATED ORAL at 09:02

## 2020-04-09 RX ADMIN — GABAPENTIN 100 MG: 100 CAPSULE ORAL at 09:01

## 2020-04-09 RX ADMIN — BENZONATATE 100 MG: 100 CAPSULE ORAL at 20:11

## 2020-04-09 RX ADMIN — HYDROXYCHLOROQUINE SULFATE 200 MG: 200 TABLET, FILM COATED ORAL at 17:39

## 2020-04-09 RX ADMIN — ENOXAPARIN SODIUM 40 MG: 40 INJECTION SUBCUTANEOUS at 09:01

## 2020-04-09 RX ADMIN — BENZONATATE 100 MG: 100 CAPSULE ORAL at 12:14

## 2020-04-09 RX ADMIN — GABAPENTIN 100 MG: 100 CAPSULE ORAL at 17:39

## 2020-04-09 RX ADMIN — GUAIFENESIN AND DEXTROMETHORPHAN 10 ML: 100; 10 SYRUP ORAL at 20:11

## 2020-04-09 RX ADMIN — ACETAMINOPHEN 975 MG: 325 TABLET, FILM COATED ORAL at 09:02

## 2020-04-09 RX ADMIN — GABAPENTIN 100 MG: 100 CAPSULE ORAL at 20:11

## 2020-04-09 NOTE — PROGRESS NOTES
Progress Note - Pulmonary   Yolanda Nunez 44 y o  female MRN: [id number]  Unit/Bed#: S -01 Encounter: [id number]        Assessment:  COVID 19 infection-positive on March 30, 2020  Respiratory distress secondary to above  Hyperlipidemia  Pre diabetes     Recommendations  Conitnue  Plaquenil and azithromycin ()  Supplemental oxygen if needed to maintain sats greater than 90%  Continue DVT prophylaxis  Would encourage self proning/positional therapy especially at night  Will check inflammatory markers if she has escalating O2 requirement  Zofran for nausea  Cont robitussin and tessalon perles   Can likely be discharged tomorrow  Would check ekg prior to discharge  Subjective:   States that she is doing all right shortness of breath is little bit better  Robitussin helped her cough is a little bit  Still has shortness of breath with walking  Was able to tolerate half a banana and cereal this morning  No other complaints  Objective:   Able to ambulate on RA with sat 93%    Vitals: Blood pressure 92/52, pulse 79, temperature 99 3 °F (37 4 °C), temperature source Oral, resp  rate 20, height 5' 2" (1 575 m), weight 65 kg (143 lb 4 8 oz), SpO2 93 % , , Body mass index is 26 21 kg/m²  Intake/Output Summary (Last 24 hours) at 4/9/2020 1119  Last data filed at 4/8/2020 2132  Gross per 24 hour   Intake 1000 ml   Output --   Net 1000 ml     Physical Exam  Gen: Awake, alert, oriented x 3, no acute distress  Tired appearing, but in no acute distress  Speaks in clear sentences, no accessory muscle use  Labs: I have personally reviewed pertinent lab results  , ABG: No results found for: PHART, VHH2HRM, PO2ART, FWB2NWT, R3SMRNTC, BEART, SOURCE  Results from last 7 days   Lab Units 04/09/20  0524 04/08/20  0541 04/07/20  0505 04/06/20  0710   WBC Thousand/uL 5 19 4 08* 2 97* 3 92*   HEMOGLOBIN g/dL 10 8* 10 4* 10 6* 12 9   HEMATOCRIT % 32 3* 31 3* 31 7* 37 7   PLATELETS Thousands/uL 229 178 139* 151 NEUTROS PCT %  --   --  46 52   MONOS PCT %  --   --  13* 13*      Results from last 7 days   Lab Units 04/09/20  0524 04/08/20  0541 04/07/20  0505 04/06/20  0710   POTASSIUM mmol/L 3 7 3 6 3 5 3 6   CHLORIDE mmol/L 103 104 105 99*   CO2 mmol/L 26 26 29 30   BUN mg/dL 5 4* 5 11   CREATININE mg/dL 0 64 0 53* 0 67 0 74   CALCIUM mg/dL 8 4 8 0* 8 0* 8 8   ALK PHOS U/L  --   --   --  63   ALT U/L  --   --   --  25   AST U/L  --   --   --  19                  Results from last 7 days   Lab Units 04/06/20  0710   LACTIC ACID mmol/L 0 8     0   Lab Value Date/Time    TROPONINI <0 02 04/06/2020 0710    TROPONINI <0 02 03/25/2019 2206    TROPONINI <0 02 03/25/2019 1814    TROPONINI <0 02 03/25/2019 1453       Microbiology:  COVID-19 positive    Imaging and other studies: I have personally reviewed pertinent reports  cXR 4/6/2020   Impression:       No acute cardiopulmonary disease         Bruce Oliveira MD  [facility]

## 2020-04-09 NOTE — PLAN OF CARE
Problem: RESPIRATORY - ADULT  Goal: Achieves optimal ventilation and oxygenation  Description  INTERVENTIONS:  - Assess for changes in respiratory status  - Assess for changes in mentation and behavior  - Position to facilitate oxygenation and minimize respiratory effort  - Oxygen administered by appropriate delivery if ordered  - Initiate smoking cessation education as indicated  - Encourage broncho-pulmonary hygiene including cough, deep breathe, Incentive Spirometry  - Assess the need for suctioning and aspirate as needed  - Assess and instruct to report SOB or any respiratory difficulty  - Respiratory Therapy support as indicated  Outcome: Progressing     Problem: INFECTION - ADULT  Goal: Absence or prevention of progression during hospitalization  Description  INTERVENTIONS:  - Assess and monitor for signs and symptoms of infection  - Monitor lab/diagnostic results  - Monitor all insertion sites, i e  indwelling lines, tubes, and drains  - Monitor endotracheal if appropriate and nasal secretions for changes in amount and color  - [place] appropriate cooling/warming therapies per order  - Administer medications as ordered  - Instruct and encourage patient and family to use good hand hygiene technique  - Identify and instruct in appropriate isolation precautions for identified infection/condition  Outcome: Progressing     Problem: Nutrition/Hydration-ADULT  Goal: Nutrient/Hydration intake appropriate for improving, restoring or maintaining nutritional needs  Description  Monitor and assess patient's nutrition/hydration status for malnutrition  Collaborate with interdisciplinary team and initiate plan and interventions as ordered  Monitor patient's weight and dietary intake as ordered or per policy  Utilize nutrition screening tool and intervene as necessary  Determine patient's food preferences and provide high-protein, high-caloric foods as appropriate       INTERVENTIONS:  - Monitor oral intake, urinary output, labs, and treatment plans  - Assess nutrition and hydration status and recommend course of action  - Evaluate amount of meals eaten  - Assist patient with eating if necessary   - Allow adequate time for meals  - Recommend/ encourage appropriate diets, oral nutritional supplements, and vitamin/mineral supplements  - Order, calculate, and assess calorie counts as needed  - Recommend, monitor, and adjust tube feedings and TPN/PPN based on assessed needs  - Assess need for intravenous fluids  - Provide specific nutrition/hydration education as appropriate  - Include patient/family/caregiver in decisions related to nutrition  Outcome: Progressing

## 2020-04-09 NOTE — ASSESSMENT & PLAN NOTE
· Improving  In the setting of positive COVID-19 infection detected at outside facility on 3/30/20  Complaints of dry cough and progressive dyspnea  · Chest x-ray on admission showed no acute pulmonary process  Patient was saturating well on room air, initially 97% though had decreasing sats to 90%    · Currently on 2 L O2 NC, saturating at 94-97%  · Continue with Plaquenil and azithromycin  · Respiratory protocol  · Incentive spirometry  · Tessalon Perles t i d   · Supportive care  · Wean off O2 as tolerated and continue to monitor sats keep above 92%  · See above problem for further management

## 2020-04-09 NOTE — PROGRESS NOTES
Progress Note - Kennedy Stephenson 1970, 44 y o  female MRN: [id number]    Unit/Bed#: S -01 Encounter: [id number]    Primary Care Provider: Eber Rivera MD   Date and time admitted to hospital: 4/6/2020  6:57 AM      * COVID-19 virus detected  Assessment & Plan  · POA  Confirmed COVID-19 positive at outside facility on 3/30/20  Symptoms started approximately 7 days ago with myalgias and febrile episodes at home with T-max up to 101  With dry cough, myalgias, fatigue and weakness  Denies any nausea or vomiting  Denies any diarrhea  Reports decreased p o  Intake due to not having an appetite  · Was noted to have increase O2 requirements last night, patient states that she was having difficulty breathing and still with persistent cough  Patient needs to be on O2 supplementation via NC  · Currently on 2 L N/C, with good saturations  · Zofran p r n  · Tessalon Perles  · Procalcitonin normal  · Pulmonary on board, appreciate recommendations  · If O2 requirements continues to increase, or if with symptomatic decline, would check for inflammatory markers  · Continue with Plaquenil and azithromycin  · Overall showing clinical improvement  Able to tolerate breakfast this morning  Breathing has improved  Will try to wean off oxygen today and will continue to monitor  Hopefully if remains stable while on room air, with likely anticipate discharge tomorrow  · Continue to monitor fever curve  · Monitor CBC    Shortness of breath  Assessment & Plan  · Improving  In the setting of positive COVID-19 infection detected at outside facility on 3/30/20  Complaints of dry cough and progressive dyspnea  · Chest x-ray on admission showed no acute pulmonary process  Patient was saturating well on room air, initially 97% though had decreasing sats to 90%    · Currently on 2 L O2 NC, saturating at 94-97%  · Continue with Plaquenil and azithromycin  · Respiratory protocol  · Incentive spirometry  · Tessalon Perles t i d  · Supportive care  · Wean off O2 as tolerated and continue to monitor sats keep above 92%  · See above problem for further management    Hyperlipidemia  Assessment & Plan  · Resume home pravastatin and fenofibrate    Prediabetes  Assessment & Plan  · Last A1c was was 6 2 in 2019  Recheck A1c  · Start diabetic diet and ISS  · Hold home Synjardy        VTE Pharmacologic Prophylaxis:   Pharmacologic: Enoxaparin (Lovenox)  Mechanical VTE Prophylaxis in Place: Yes    Discussions with Specialists or Other Care Team Provider:  Discussed with the nurse, attending, pulmonology    Education and Discussions with Family / Patient:  Discussed with the patient    Current Length of Stay: 2 day(s)    Current Patient Status: Inpatient     Discharge Plan / Estimated Discharge Date: To be determined based on clinical course, likely within 24-48 hours pending clinical improvement    Code Status: Level 1 - Full Code      Subjective:   Patient states that her breathing has improved  Was able to be off of oxygen last night but still on oxygen this morning with good sats  Able to tolerate breakfast this morning  Denies chest pain, abdominal pain  No nausea or vomiting  No dizziness or lightheadedness  Still with cough however with noted minimal improvement  She is okay with weaning off today and see how she does  Objective:     Vitals:   Temp (24hrs), Av °F (37 2 °C), Min:98 9 °F (37 2 °C), Max:99 3 °F (37 4 °C)    Temp:  [98 9 °F (37 2 °C)-99 3 °F (37 4 °C)] 99 3 °F (37 4 °C)  HR:  [79-90] 79  Resp:  [18-20] 20  BP: ()/(52-63) 92/52  SpO2:  [90 %-97 %] 93 %  Body mass index is 26 21 kg/m²  Input and Output Summary (last 24 hours): Intake/Output Summary (Last 24 hours) at 2020 1159  Last data filed at 2020 2132  Gross per 24 hour   Intake 1000 ml   Output --   Net 1000 ml       Physical Exam:     Physical Exam   Constitutional: She is oriented to person, place, and time  No distress  Sitting up in bed, eating breakfast   HENT:   Head: Normocephalic and atraumatic  Eyes: No scleral icterus  Neck: Normal range of motion  Cardiovascular:   No JVD, no lower extremity edema   Pulmonary/Chest: Effort normal  No respiratory distress  Able to speak in full sentences, still coughing   Abdominal: She exhibits no distension  Musculoskeletal: Normal range of motion  She exhibits no edema  Neurological: She is alert and oriented to person, place, and time  No facial asymmetry, conversant   Skin: No rash noted  No erythema  Psychiatric: She has a normal mood and affect  Her behavior is normal  Thought content normal    Nursing note and vitals reviewed  Additional Data:     Labs:    Results from last 7 days   Lab Units 04/09/20  0524  04/07/20  0505   WBC Thousand/uL 5 19   < > 2 97*   HEMOGLOBIN g/dL 10 8*   < > 10 6*   HEMATOCRIT % 32 3*   < > 31 7*   PLATELETS Thousands/uL 229   < > 139*   NEUTROS PCT %  --   --  46   LYMPHS PCT %  --   --  41   MONOS PCT %  --   --  13*   EOS PCT %  --   --  0    < > = values in this interval not displayed  Results from last 7 days   Lab Units 04/09/20  0524  04/06/20  0710   POTASSIUM mmol/L 3 7   < > 3 6   CHLORIDE mmol/L 103   < > 99*   CO2 mmol/L 26   < > 30   BUN mg/dL 5   < > 11   CREATININE mg/dL 0 64   < > 0 74   CALCIUM mg/dL 8 4   < > 8 8   ALK PHOS U/L  --   --  63   ALT U/L  --   --  25   AST U/L  --   --  19    < > = values in this interval not displayed  * I Have Reviewed All Lab Data Listed Above  * Additional Pertinent Lab Tests Reviewed:  Shanna 64 Admission Reviewed    Imaging:    Imaging Reports Reviewed Today Include: NA  Imaging Personally Reviewed by Myself Includes:  NA    Recent Cultures (last 7 days):           Last 24 Hours Medication List:     Current Facility-Administered Medications:  acetaminophen 975 mg Oral Q6H PRN Sharon Ku MD   aspirin 81 mg Oral Daily Sharon Ku MD azithromycin 250 mg Oral Q24H Blank Rene MD   benzonatate 100 mg Oral TID PRN Bobbi Cho MD   dextromethorphan-guaiFENesin 10 mL Oral Q4H PRN Jhon Robertson MD   enoxaparin 40 mg Subcutaneous Daily Torres Dowell MD   fenofibrate 48 mg Oral Daily Torres Dowell MD   gabapentin 100 mg Oral TID Torres Dowell MD   hydroxychloroquine 200 mg Oral BID Torres Dowell MD   insulin lispro 1-5 Units Subcutaneous TID Rito Mao MD   insulin lispro 1-5 Units Subcutaneous HS Torres Dowell MD   ondansetron 4 mg Intravenous Q6H PRN Jessica Calero MD   pravastatin 40 mg Oral Daily With Naty Arnold MD        Today, Patient Was Seen By: Jessica Calero MD    ** Please Note: This note has been constructed using a voice recognition system   **

## 2020-04-09 NOTE — ASSESSMENT & PLAN NOTE
· Last A1c was was 6 2 in July 2019  Recheck A1c  · Start diabetic diet and ISS    · Hold home [place]

## 2020-04-09 NOTE — ASSESSMENT & PLAN NOTE
· POA  Confirmed COVID-19 positive at outside facility on 3/30/20  Symptoms started approximately 7 days ago with myalgias and febrile episodes at home with T-max up to 101  With dry cough, myalgias, fatigue and weakness  Denies any nausea or vomiting  Denies any diarrhea  Reports decreased p o  Intake due to not having an appetite  · Was noted to have increase O2 requirements last night, patient states that she was having difficulty breathing and still with persistent cough  Patient needs to be on O2 supplementation via NC  · Currently on 2 L N/C, with good saturations  · Zofran p r n  · Tessalon Perles  · Procalcitonin normal  · Pulmonary on board, appreciate recommendations  · If O2 requirements continues to increase, or if with symptomatic decline, would check for inflammatory markers  · Continue with Plaquenil and azithromycin  · Overall showing clinical improvement  Able to tolerate breakfast this morning  Breathing has improved  Will try to wean off oxygen today and will continue to monitor    Hopefully if remains stable while on room air, with likely anticipate discharge tomorrow  · Continue to monitor fever curve  · Monitor CBC

## 2020-04-10 LAB
ANION GAP SERPL CALCULATED.3IONS-SCNC: 9 MMOL/L (ref 4–13)
BUN SERPL-MCNC: 6 MG/DL (ref 5–25)
CALCIUM SERPL-MCNC: 8.2 MG/DL (ref 8.3–10.1)
CHLORIDE SERPL-SCNC: 104 MMOL/L (ref 100–108)
CO2 SERPL-SCNC: 26 MMOL/L (ref 21–32)
CREAT SERPL-MCNC: 0.5 MG/DL (ref 0.6–1.3)
ERYTHROCYTE [DISTWIDTH] IN BLOOD BY AUTOMATED COUNT: 12 % (ref 11.6–15.1)
GFR SERPL CREATININE-BSD FRML MDRD: 112 ML/MIN/1.73SQ M
GLUCOSE SERPL-MCNC: 103 MG/DL (ref 65–140)
GLUCOSE SERPL-MCNC: 109 MG/DL (ref 65–140)
GLUCOSE SERPL-MCNC: 113 MG/DL (ref 65–140)
GLUCOSE SERPL-MCNC: 136 MG/DL (ref 65–140)
GLUCOSE SERPL-MCNC: 150 MG/DL (ref 65–140)
HCT VFR BLD AUTO: 28.6 % (ref 34.8–46.1)
HGB BLD-MCNC: 9.6 G/DL (ref 11.5–15.4)
MCH RBC QN AUTO: 30.5 PG (ref 26.8–34.3)
MCHC RBC AUTO-ENTMCNC: 33.6 G/DL (ref 31.4–37.4)
MCV RBC AUTO: 91 FL (ref 82–98)
PLATELET # BLD AUTO: 235 THOUSANDS/UL (ref 149–390)
PMV BLD AUTO: 11.8 FL (ref 8.9–12.7)
POTASSIUM SERPL-SCNC: 3.7 MMOL/L (ref 3.5–5.3)
RBC # BLD AUTO: 3.15 MILLION/UL (ref 3.81–5.12)
SODIUM SERPL-SCNC: 139 MMOL/L (ref 136–145)
WBC # BLD AUTO: 5 THOUSAND/UL (ref 4.31–10.16)

## 2020-04-10 PROCEDURE — 80048 BASIC METABOLIC PNL TOTAL CA: CPT | Performed by: INTERNAL MEDICINE

## 2020-04-10 PROCEDURE — 82948 REAGENT STRIP/BLOOD GLUCOSE: CPT

## 2020-04-10 PROCEDURE — 99232 SBSQ HOSP IP/OBS MODERATE 35: CPT | Performed by: INTERNAL MEDICINE

## 2020-04-10 PROCEDURE — 85027 COMPLETE CBC AUTOMATED: CPT | Performed by: INTERNAL MEDICINE

## 2020-04-10 RX ORDER — BENZONATATE 100 MG/1
100 CAPSULE ORAL 3 TIMES DAILY
Status: DISCONTINUED | OUTPATIENT
Start: 2020-04-10 | End: 2020-04-12 | Stop reason: HOSPADM

## 2020-04-10 RX ORDER — GUAIFENESIN/DEXTROMETHORPHAN 100-10MG/5
10 SYRUP ORAL EVERY 4 HOURS
Status: DISCONTINUED | OUTPATIENT
Start: 2020-04-10 | End: 2020-04-12 | Stop reason: HOSPADM

## 2020-04-10 RX ADMIN — ASPIRIN 81 MG 81 MG: 81 TABLET ORAL at 08:28

## 2020-04-10 RX ADMIN — AZITHROMYCIN 250 MG: 250 TABLET, FILM COATED ORAL at 12:01

## 2020-04-10 RX ADMIN — PRAVASTATIN SODIUM 40 MG: 40 TABLET ORAL at 16:42

## 2020-04-10 RX ADMIN — GABAPENTIN 100 MG: 100 CAPSULE ORAL at 20:16

## 2020-04-10 RX ADMIN — GUAIFENESIN AND DEXTROMETHORPHAN 10 ML: 100; 10 SYRUP ORAL at 16:42

## 2020-04-10 RX ADMIN — INSULIN LISPRO 1 UNITS: 100 INJECTION, SOLUTION INTRAVENOUS; SUBCUTANEOUS at 13:58

## 2020-04-10 RX ADMIN — GABAPENTIN 100 MG: 100 CAPSULE ORAL at 16:42

## 2020-04-10 RX ADMIN — ENOXAPARIN SODIUM 40 MG: 40 INJECTION SUBCUTANEOUS at 08:28

## 2020-04-10 RX ADMIN — GUAIFENESIN AND DEXTROMETHORPHAN 10 ML: 100; 10 SYRUP ORAL at 12:02

## 2020-04-10 RX ADMIN — FENOFIBRATE 48 MG: 48 TABLET, FILM COATED ORAL at 08:27

## 2020-04-10 RX ADMIN — GUAIFENESIN AND DEXTROMETHORPHAN 10 ML: 100; 10 SYRUP ORAL at 20:17

## 2020-04-10 RX ADMIN — ACETAMINOPHEN 975 MG: 325 TABLET, FILM COATED ORAL at 22:05

## 2020-04-10 RX ADMIN — ACETAMINOPHEN 975 MG: 325 TABLET, FILM COATED ORAL at 09:35

## 2020-04-10 RX ADMIN — BENZONATATE 100 MG: 100 CAPSULE ORAL at 16:42

## 2020-04-10 RX ADMIN — GUAIFENESIN AND DEXTROMETHORPHAN 10 ML: 100; 10 SYRUP ORAL at 23:38

## 2020-04-10 RX ADMIN — HYDROXYCHLOROQUINE SULFATE 200 MG: 200 TABLET, FILM COATED ORAL at 16:43

## 2020-04-10 RX ADMIN — HYDROXYCHLOROQUINE SULFATE 200 MG: 200 TABLET, FILM COATED ORAL at 08:27

## 2020-04-10 RX ADMIN — BENZONATATE 100 MG: 100 CAPSULE ORAL at 20:16

## 2020-04-10 RX ADMIN — GABAPENTIN 100 MG: 100 CAPSULE ORAL at 08:28

## 2020-04-10 RX ADMIN — GUAIFENESIN AND DEXTROMETHORPHAN 10 ML: 100; 10 SYRUP ORAL at 01:16

## 2020-04-10 NOTE — ASSESSMENT & PLAN NOTE
· POA  Confirmed COVID-19 positive at outside facility on 3/30/20  Symptoms started approximately 7 days ago with myalgias and febrile episodes at home with T-max up to 101  With dry cough, myalgias, fatigue and weakness  Denies any nausea or vomiting  Denies any diarrhea  Reports decreased p o  Intake due to not having an appetite  · Was doing well yesterday with weaning off O2, was also saturating well on room air  However around 11:00 p m  Last night, patient complained of shortness of breath  Was placed on 1 L with good response, however despite being on 2 L has been saturating around 91%  Cough has mildly improved  Patient however still complaining of shortness of breath  · Zofran p r n  · Tessalon Perles  · Procalcitonin normal  · Pulmonary on board, appreciate recommendations  · If O2 requirements continues to increase, or if with symptomatic decline, would check for inflammatory markers  · Continue with Plaquenil and azithromycin, QTC this morning 413  · Would again try to wean off O2 today, would monitor on room air  Hopefully by tomorrow patient would show good clinical response and would be stable for discharge    · Continue to monitor fever curve  · Monitor CBC

## 2020-04-10 NOTE — PROGRESS NOTES
Progress Note - Pulmonary   Yolanda Palacios 44 y o  female MRN: [id number]  Unit/Bed#: S -21 Encounter: [id number]          Assessment:  68-year-old woman admitted on April 6 with COVID 19 pneumonia  COVID 19 infection-positive on March 30, 2020  Respiratory distress secondary to above  Hyperlipidemia  Pre diabetes     Recommendations  Continue plaquenil (last dose on 4/10)   Continue azithromycin for 5 days total (last dose on 4/12)   Supplemental oxygen if needed to maintain sats greater than 90%  Would obtain ambulatory desat study  Continue DVT prophylaxis  Would encourage self proning/positional therapy especially at night  Will check inflammatory markers if she has escalating O2 requirement  Zofran for nausea  Cont robitussin and tessalon perles   Can likely be discharged tomorrow  Would check ekg prior to discharge  recommend adding fiber to her diet  Subjective:   States that she was more short of breath last night  Doing okay right now  She is having hard time self proning  Appetite is returning  Still having loose bowel movements  Would like her cough medicine schedule  No other complaints period  Objective: Has required up to 2L NC today   EKG 4/8 QtC 422    Vitals: Blood pressure 98/50, pulse 77, temperature 98 6 °F (37 °C), temperature source Oral, resp  rate 20, height 5' 2" (1 575 m), weight 65 kg (143 lb 4 8 oz), SpO2 91 % , 2L , Body mass index is 26 21 kg/m²  No intake or output data in the 24 hours ending 04/10/20 0920      Physical Exam  Gen: Awake, alert, oriented x 3, no acute distress  Full physical exam deferred due to COVID-19 Precautions, limit exposures and PPE consumption  Patient visualized through window during phone interview/assessment  Labs: I have personally reviewed pertinent lab results    Results from last 7 days   Lab Units 04/10/20  0446 04/09/20  0524 04/08/20  0541 04/07/20  0505 04/06/20  0710   WBC Thousand/uL 5 00 5 19 4 08* 2 97* 3 92* HEMOGLOBIN g/dL 9 6* 10 8* 10 4* 10 6* 12 9   HEMATOCRIT % 28 6* 32 3* 31 3* 31 7* 37 7   PLATELETS Thousands/uL 235 229 178 139* 151   NEUTROS PCT %  --   --   --  46 52   MONOS PCT %  --   --   --  13* 13*      Results from last 7 days   Lab Units 04/10/20  0446 04/09/20  0524 04/08/20  0541  04/06/20  0710   POTASSIUM mmol/L 3 7 3 7 3 6   < > 3 6   CHLORIDE mmol/L 104 103 104   < > 99*   CO2 mmol/L 26 26 26   < > 30   BUN mg/dL 6 5 4*   < > 11   CREATININE mg/dL 0 50* 0 64 0 53*   < > 0 74   CALCIUM mg/dL 8 2* 8 4 8 0*   < > 8 8   ALK PHOS U/L  --   --   --   --  63   ALT U/L  --   --   --   --  25   AST U/L  --   --   --   --  19    < > = values in this interval not displayed  Results from last 7 days   Lab Units 04/06/20  0710   LACTIC ACID mmol/L 0 8     0   Lab Value Date/Time    TROPONINI <0 02 04/06/2020 0710    TROPONINI <0 02 03/25/2019 2206    TROPONINI <0 02 03/25/2019 1814    TROPONINI <0 02 03/25/2019 1453       Microbiology:  COVID-19 positive    Imaging and other studies: I have personally reviewed pertinent reports  Chest x-ray 04/06/2020  Impression:       No acute cardiopulmonary disease         MD [facility]

## 2020-04-10 NOTE — PLAN OF CARE
Problem: RESPIRATORY - ADULT  Goal: Achieves optimal ventilation and oxygenation  Description  INTERVENTIONS:  - Assess for changes in respiratory status  - Assess for changes in mentation and behavior  - Position to facilitate oxygenation and minimize respiratory effort  - Oxygen administered by appropriate delivery if ordered  - Initiate smoking cessation education as indicated  - Encourage broncho-pulmonary hygiene including cough, deep breathe, Incentive Spirometry  - Assess the need for suctioning and aspirate as needed  - Assess and instruct to report SOB or any respiratory difficulty  - Respiratory Therapy support as indicated  Outcome: Progressing     Problem: INFECTION - ADULT  Goal: Absence or prevention of progression during hospitalization  Description  INTERVENTIONS:  - Assess and monitor for signs and symptoms of infection  - Monitor lab/diagnostic results  - Monitor all insertion sites, i e  indwelling lines, tubes, and drains  - Monitor endotracheal if appropriate and nasal secretions for changes in amount and color  - [place] appropriate cooling/warming therapies per order  - Administer medications as ordered  - Instruct and encourage patient and family to use good hand hygiene technique  - Identify and instruct in appropriate isolation precautions for identified infection/condition  Outcome: Progressing     Problem: Nutrition/Hydration-ADULT  Goal: Nutrient/Hydration intake appropriate for improving, restoring or maintaining nutritional needs  Description  Monitor and assess patient's nutrition/hydration status for malnutrition  Collaborate with interdisciplinary team and initiate plan and interventions as ordered  Monitor patient's weight and dietary intake as ordered or per policy  Utilize nutrition screening tool and intervene as necessary  Determine patient's food preferences and provide high-protein, high-caloric foods as appropriate       INTERVENTIONS:  - Monitor oral intake, urinary output, labs, and treatment plans  - Assess nutrition and hydration status and recommend course of action  - Evaluate amount of meals eaten  - Assist patient with eating if necessary   - Allow adequate time for meals  - Recommend/ encourage appropriate diets, oral nutritional supplements, and vitamin/mineral supplements  - Order, calculate, and assess calorie counts as needed  - Recommend, monitor, and adjust tube feedings and TPN/PPN based on assessed needs  - Assess need for intravenous fluids  - Provide specific nutrition/hydration education as appropriate  - Include patient/family/caregiver in decisions related to nutrition  Outcome: Progressing

## 2020-04-10 NOTE — PROGRESS NOTES
Progress Note - Nessa Quivers 1970, 44 y o  female MRN: [id number]    Unit/Bed#: S -01 Encounter: [id number]    Primary Care Provider: Arina Lam MD   Date and time admitted to hospital: 4/6/2020  6:57 AM      * COVID-19 virus detected  Assessment & Plan  · POA  Confirmed COVID-19 positive at outside facility on 3/30/20  Symptoms started approximately 7 days ago with myalgias and febrile episodes at home with T-max up to 101  With dry cough, myalgias, fatigue and weakness  Denies any nausea or vomiting  Denies any diarrhea  Reports decreased p o  Intake due to not having an appetite  · Was doing well yesterday with weaning off O2, was also saturating well on room air  However around 11:00 p m  Last night, patient complained of shortness of breath  Was placed on 1 L with good response, however despite being on 2 L has been saturating around 91%  Cough has mildly improved  Patient however still complaining of shortness of breath  · Zofran p r n  · Tessalon Perles  · Procalcitonin normal  · Pulmonary on board, appreciate recommendations  · If O2 requirements continues to increase, or if with symptomatic decline, would check for inflammatory markers  · Continue with Plaquenil and azithromycin, QTC this morning 413  · Would again try to wean off O2 today, would monitor on room air  Hopefully by tomorrow patient would show good clinical response and would be stable for discharge  · Continue to monitor fever curve  · Monitor CBC    Shortness of breath  Assessment & Plan  · Improving  In the setting of positive COVID-19 infection detected at outside facility on 3/30/20  Complaints of dry cough and progressive dyspnea  · Chest x-ray on admission showed no acute pulmonary process  Patient was saturating well on room air, initially 97% though had decreasing sats to 90%    · Currently on 2 L O2 NC, saturating at 94-97%  · Continue with Plaquenil and azithromycin  · Respiratory protocol  · Incentive spirometry  · Tessalon Perles t i d   · Supportive care  · Wean off O2 as tolerated and continue to monitor sats keep above 92%  · See above problem for further management    Hyperlipidemia  Assessment & Plan  · Resume home pravastatin and fenofibrate    Prediabetes  Assessment & Plan  · Last A1c was was 6 2 in 2019  Recheck A1c  · Start diabetic diet and ISS  · Hold home Synjardy        VTE Pharmacologic Prophylaxis:   Pharmacologic: Enoxaparin (Lovenox)  Mechanical VTE Prophylaxis in Place: Yes    Discussions with Specialists or Other Care Team Provider:  Discussed with the nurse, attending, CM, updated Pulm    Education and Discussions with Family / Patient:  Discussed with the patient, patient stated that she will update her family members    Current Length of Stay: 3 day(s)    Current Patient Status: Inpatient     Discharge Plan / Estimated Discharge Date: To be determined based on clinical course, hopefully within 24 hours    Code Status: Level 1 - Full Code      Subjective:   Patient seen and examined this morning  Patient stated that she had difficulty breathing last night  She had to go back on O2 supplementation, currently she is on 2 L  She reports that her cough has mildly improved, but still complaining of shortness of breath this morning  Still with diarrhea  No chest pain, no febrile episodes noted overnight  Objective:     Vitals:   Temp (24hrs), Av 1 °F (37 3 °C), Min:98 6 °F (37 °C), Max:99 5 °F (37 5 °C)    Temp:  [98 6 °F (37 °C)-99 5 °F (37 5 °C)] 98 6 °F (37 °C)  HR:  [74-81] 77  Resp:  [18-20] 20  BP: ()/(50-64) 98/50  SpO2:  [91 %-96 %] 91 %  Body mass index is 26 21 kg/m²  Input and Output Summary (last 24 hours):        Intake/Output Summary (Last 24 hours) at 4/10/2020 1419  Last data filed at 4/10/2020 0935  Gross per 24 hour   Intake 150 ml   Output --   Net 150 ml       Physical Exam:     Physical Exam   Constitutional: She is oriented to person, place, and time  No distress  Appears fatigued, with O2 supplementation   HENT:   Head: Normocephalic and atraumatic  Eyes: Conjunctivae are normal  No scleral icterus  Neck: Normal range of motion  No JVD present  Cardiovascular: Normal rate, regular rhythm and normal heart sounds  Pulmonary/Chest: No respiratory distress  She has no wheezes  On O2 via NC, Decreased breath sounds however with poor effort   Abdominal: Soft  Bowel sounds are normal  There is no tenderness  There is no guarding  Musculoskeletal: Normal range of motion  She exhibits no edema or tenderness  Neurological: She is alert and oriented to person, place, and time  She exhibits normal muscle tone  Skin: Skin is warm  No rash noted  She is not diaphoretic  No erythema  Psychiatric: She has a normal mood and affect  Her behavior is normal    Nursing note and vitals reviewed  Additional Data:     Labs:    Results from last 7 days   Lab Units 04/10/20  0446  04/07/20  0505   WBC Thousand/uL 5 00   < > 2 97*   HEMOGLOBIN g/dL 9 6*   < > 10 6*   HEMATOCRIT % 28 6*   < > 31 7*   PLATELETS Thousands/uL 235   < > 139*   NEUTROS PCT %  --   --  46   LYMPHS PCT %  --   --  41   MONOS PCT %  --   --  13*   EOS PCT %  --   --  0    < > = values in this interval not displayed  Results from last 7 days   Lab Units 04/10/20  0446  04/06/20  0710   POTASSIUM mmol/L 3 7   < > 3 6   CHLORIDE mmol/L 104   < > 99*   CO2 mmol/L 26   < > 30   BUN mg/dL 6   < > 11   CREATININE mg/dL 0 50*   < > 0 74   CALCIUM mg/dL 8 2*   < > 8 8   ALK PHOS U/L  --   --  63   ALT U/L  --   --  25   AST U/L  --   --  19    < > = values in this interval not displayed  * I Have Reviewed All Lab Data Listed Above  * Additional Pertinent Lab Tests Reviewed:  All [place] Admission Reviewed    Imaging:    Imaging Reports Reviewed Today Include: NA  Imaging Personally Reviewed by Myself Includes:  PRIMO    Recent Cultures (last 7 days): Last 24 Hours Medication List:     Current Facility-Administered Medications:  acetaminophen 975 mg Oral Q6H PRN Mariza Kim MD   aspirin 81 mg Oral Daily Mariza Kim MD   azithromycin 250 mg Oral Q24H Blank Rene MD   benzonatate 100 mg Oral TID Raiza Gallego MD   dextromethorphan-guaiFENesin 10 mL Oral Q4H Raiza Gallego MD   enoxaparin 40 mg Subcutaneous Daily Mariza Kim MD   fenofibrate 48 mg Oral Daily Mariza Kim MD   gabapentin 100 mg Oral TID Mariza Kim MD   hydroxychloroquine 200 mg Oral BID Mariza Kim MD   insulin lispro 1-5 Units Subcutaneous TID Yony Welsh MD   insulin lispro 1-5 Units Subcutaneous HS Mariza Kim MD   ondansetron 4 mg Intravenous Q6H PRN Cherelle Shearer MD   pravastatin 40 mg Oral Daily With Israel Ortega MD        Today, Patient Was Seen By: Cherelle Shearer MD    ** Please Note: This note has been constructed using a voice recognition system   **

## 2020-04-10 NOTE — PLAN OF CARE
Problem: RESPIRATORY - ADULT  Goal: Achieves optimal ventilation and oxygenation  Description  INTERVENTIONS:  - Assess for changes in respiratory status  - Assess for changes in mentation and behavior  - Position to facilitate oxygenation and minimize respiratory effort  - Oxygen administered by appropriate delivery if ordered  - Initiate smoking cessation education as indicated  - Encourage broncho-pulmonary hygiene including cough, deep breathe, Incentive Spirometry  - Assess the need for suctioning and aspirate as needed  - Assess and instruct to report SOB or any respiratory difficulty  - Respiratory Therapy support as indicated  Outcome: Progressing     Problem: INFECTION - ADULT  Goal: Absence or prevention of progression during hospitalization  Description  INTERVENTIONS:  - Assess and monitor for signs and symptoms of infection  - Monitor lab/diagnostic results  - Monitor all insertion sites, i e  indwelling lines, tubes, and drains  - Monitor endotracheal if appropriate and nasal secretions for changes in amount and color  - [place] appropriate cooling/warming therapies per order  - Administer medications as ordered  - Instruct and encourage patient and family to use good hand hygiene technique  - Identify and instruct in appropriate isolation precautions for identified infection/condition  Outcome: Progressing     Problem: Nutrition/Hydration-ADULT  Goal: Nutrient/Hydration intake appropriate for improving, restoring or maintaining nutritional needs  Description  Monitor and assess patient's nutrition/hydration status for malnutrition  Collaborate with interdisciplinary team and initiate plan and interventions as ordered  Monitor patient's weight and dietary intake as ordered or per policy  Utilize nutrition screening tool and intervene as necessary  Determine patient's food preferences and provide high-protein, high-caloric foods as appropriate       INTERVENTIONS:  - Monitor oral intake, urinary output, labs, and treatment plans  - Assess nutrition and hydration status and recommend course of action  - Evaluate amount of meals eaten  - Assist patient with eating if necessary   - Allow adequate time for meals  - Recommend/ encourage appropriate diets, oral nutritional supplements, and vitamin/mineral supplements  - Order, calculate, and assess calorie counts as needed  - Recommend, monitor, and adjust tube feedings and TPN/PPN based on assessed needs  - Assess need for intravenous fluids  - Provide specific nutrition/hydration education as appropriate  - Include patient/family/caregiver in decisions related to nutrition  Outcome: Progressing

## 2020-04-11 PROBLEM — E87.6 HYPOKALEMIA: Status: ACTIVE | Noted: 2020-04-11

## 2020-04-11 LAB
ANION GAP SERPL CALCULATED.3IONS-SCNC: 7 MMOL/L (ref 4–13)
BUN SERPL-MCNC: 4 MG/DL (ref 5–25)
CALCIUM SERPL-MCNC: 8.3 MG/DL (ref 8.3–10.1)
CHLORIDE SERPL-SCNC: 105 MMOL/L (ref 100–108)
CO2 SERPL-SCNC: 29 MMOL/L (ref 21–32)
CREAT SERPL-MCNC: 0.63 MG/DL (ref 0.6–1.3)
ERYTHROCYTE [DISTWIDTH] IN BLOOD BY AUTOMATED COUNT: 11.9 % (ref 11.6–15.1)
GFR SERPL CREATININE-BSD FRML MDRD: 104 ML/MIN/1.73SQ M
GLUCOSE SERPL-MCNC: 105 MG/DL (ref 65–140)
GLUCOSE SERPL-MCNC: 113 MG/DL (ref 65–140)
GLUCOSE SERPL-MCNC: 152 MG/DL (ref 65–140)
GLUCOSE SERPL-MCNC: 84 MG/DL (ref 65–140)
GLUCOSE SERPL-MCNC: 87 MG/DL (ref 65–140)
HCT VFR BLD AUTO: 26.6 % (ref 34.8–46.1)
HGB BLD-MCNC: 9 G/DL (ref 11.5–15.4)
MCH RBC QN AUTO: 31 PG (ref 26.8–34.3)
MCHC RBC AUTO-ENTMCNC: 33.8 G/DL (ref 31.4–37.4)
MCV RBC AUTO: 92 FL (ref 82–98)
PLATELET # BLD AUTO: 285 THOUSANDS/UL (ref 149–390)
PMV BLD AUTO: 11.3 FL (ref 8.9–12.7)
POTASSIUM SERPL-SCNC: 3.4 MMOL/L (ref 3.5–5.3)
RBC # BLD AUTO: 2.9 MILLION/UL (ref 3.81–5.12)
SODIUM SERPL-SCNC: 141 MMOL/L (ref 136–145)
WBC # BLD AUTO: 4.83 THOUSAND/UL (ref 4.31–10.16)

## 2020-04-11 PROCEDURE — 80048 BASIC METABOLIC PNL TOTAL CA: CPT | Performed by: INTERNAL MEDICINE

## 2020-04-11 PROCEDURE — 99232 SBSQ HOSP IP/OBS MODERATE 35: CPT | Performed by: INTERNAL MEDICINE

## 2020-04-11 PROCEDURE — 85027 COMPLETE CBC AUTOMATED: CPT | Performed by: INTERNAL MEDICINE

## 2020-04-11 PROCEDURE — 82948 REAGENT STRIP/BLOOD GLUCOSE: CPT

## 2020-04-11 RX ORDER — POTASSIUM CHLORIDE 20 MEQ/1
40 TABLET, EXTENDED RELEASE ORAL ONCE
Status: COMPLETED | OUTPATIENT
Start: 2020-04-11 | End: 2020-04-11

## 2020-04-11 RX ADMIN — HYDROXYCHLOROQUINE SULFATE 200 MG: 200 TABLET, FILM COATED ORAL at 09:06

## 2020-04-11 RX ADMIN — GUAIFENESIN AND DEXTROMETHORPHAN 10 ML: 100; 10 SYRUP ORAL at 20:08

## 2020-04-11 RX ADMIN — ACETAMINOPHEN 975 MG: 325 TABLET, FILM COATED ORAL at 09:06

## 2020-04-11 RX ADMIN — GUAIFENESIN AND DEXTROMETHORPHAN 10 ML: 100; 10 SYRUP ORAL at 09:00

## 2020-04-11 RX ADMIN — GUAIFENESIN AND DEXTROMETHORPHAN 10 ML: 100; 10 SYRUP ORAL at 11:28

## 2020-04-11 RX ADMIN — GABAPENTIN 100 MG: 100 CAPSULE ORAL at 09:07

## 2020-04-11 RX ADMIN — FENOFIBRATE 48 MG: 48 TABLET, FILM COATED ORAL at 09:05

## 2020-04-11 RX ADMIN — GABAPENTIN 100 MG: 100 CAPSULE ORAL at 17:00

## 2020-04-11 RX ADMIN — ONDANSETRON 4 MG: 2 INJECTION INTRAMUSCULAR; INTRAVENOUS at 12:25

## 2020-04-11 RX ADMIN — POTASSIUM CHLORIDE 40 MEQ: 1500 TABLET, EXTENDED RELEASE ORAL at 18:57

## 2020-04-11 RX ADMIN — BENZONATATE 100 MG: 100 CAPSULE ORAL at 23:14

## 2020-04-11 RX ADMIN — PRAVASTATIN SODIUM 40 MG: 40 TABLET ORAL at 17:00

## 2020-04-11 RX ADMIN — BENZONATATE 100 MG: 100 CAPSULE ORAL at 17:00

## 2020-04-11 RX ADMIN — GABAPENTIN 100 MG: 100 CAPSULE ORAL at 23:14

## 2020-04-11 RX ADMIN — AZITHROMYCIN 250 MG: 250 TABLET, FILM COATED ORAL at 11:00

## 2020-04-11 RX ADMIN — ASPIRIN 81 MG 81 MG: 81 TABLET ORAL at 09:06

## 2020-04-11 RX ADMIN — GUAIFENESIN AND DEXTROMETHORPHAN 10 ML: 100; 10 SYRUP ORAL at 17:00

## 2020-04-11 RX ADMIN — BENZONATATE 100 MG: 100 CAPSULE ORAL at 09:07

## 2020-04-11 RX ADMIN — GUAIFENESIN AND DEXTROMETHORPHAN 10 ML: 100; 10 SYRUP ORAL at 23:14

## 2020-04-11 RX ADMIN — ENOXAPARIN SODIUM 40 MG: 40 INJECTION SUBCUTANEOUS at 09:06

## 2020-04-11 NOTE — PLAN OF CARE
Problem: RESPIRATORY - ADULT  Goal: Achieves optimal ventilation and oxygenation  Description  INTERVENTIONS:  - Assess for changes in respiratory status  - Assess for changes in mentation and behavior  - Position to facilitate oxygenation and minimize respiratory effort  - Oxygen administered by appropriate delivery if ordered  - Initiate smoking cessation education as indicated  - Encourage broncho-pulmonary hygiene including cough, deep breathe, Incentive Spirometry  - Assess the need for suctioning and aspirate as needed  - Assess and instruct to report SOB or any respiratory difficulty  - Respiratory Therapy support as indicated  Outcome: Progressing     Problem: INFECTION - ADULT  Goal: Absence or prevention of progression during hospitalization  Description  INTERVENTIONS:  - Assess and monitor for signs and symptoms of infection  - Monitor lab/diagnostic results  - Monitor all insertion sites, i e  indwelling lines, tubes, and drains  - Monitor endotracheal if appropriate and nasal secretions for changes in amount and color  - [place] appropriate cooling/warming therapies per order  - Administer medications as ordered  - Instruct and encourage patient and family to use good hand hygiene technique  - Identify and instruct in appropriate isolation precautions for identified infection/condition  Outcome: Progressing

## 2020-04-11 NOTE — PROGRESS NOTES
Progress Note - Deepika Payne 1970, 44 y o  female MRN: [id number]    Unit/Bed#: S -01 Encounter: [id number]    Primary Care Provider: Kasi Tripp MD   Date and time admitted to hospital: 4/6/2020  6:57 AM        * COVID-19 virus detected  Assessment & Plan  · POA  Confirmed COVID-19 positive at outside facility on 3/30/20  Symptoms started approximately 7 days ago with myalgias and febrile episodes at home with T-max up to 101  With dry cough, myalgias, fatigue and weakness  Denies any nausea or vomiting  Denies any diarrhea  Reports decreased p o  Intake due to not having an appetite  · Was doing well yesterday with weaning off O2, was also saturating well on room air  However around 11:00 p m  Last night, patient complained of shortness of breath  Was placed on 1 L with good response, however despite being on 2 L has been saturating around 91%  Cough has mildly improved  Patient however still complaining of shortness of breath  · Zofran p r n  · Tessalon Perles  · Procalcitonin normal  · Pulmonary on board, appreciate recommendations  · If O2 requirements continues to increase, or if with symptomatic decline, would check for inflammatory markers  · Continue with Plaquenil and azithromycin  · Would again try to wean off O2 today, would monitor on room air  Hopefully by tomorrow patient would show good clinical response and would be stable for discharge  · Check O2 sat on RA     Prediabetes  Assessment & Plan  · Last A1c was was 6 2 in July 2019  Recheck A1c  · Start diabetic diet and ISS  · Hold home Synjardy    Hypokalemia  Assessment & Plan  Replete K          VTE Pharmacologic Prophylaxis:   Pharmacologic: Enoxaparin (Lovenox)  Mechanical VTE Prophylaxis in Place: Yes    Patient Centered Rounds:    Discussions with Specialists or Other Care Team Provider:     Education and Discussions with Family / Patient:     Time Spent for Care: 20 minutes    More than 50% of total time spent on counseling and coordination of care as described above  Current Length of Stay: 4 day(s)    Current Patient Status: Inpatient   Certification Statement: The patient will continue to require additional inpatient hospital stay due to above    Discharge Plan / Estimated Discharge Date: May DC Am if stable    Code Status: Level 1 - Full Code      Subjective:   Pt felt that she had bad moments at times with nausea and dyspnea but overall improved  Current O2 sat stable on RA  Still having loose stool  Appetites have improved     Objective:     Vitals:   Temp (24hrs), Av 7 °F (37 1 °C), Min:97 9 °F (36 6 °C), Max:100 1 °F (37 8 °C)    Temp:  [97 9 °F (36 6 °C)-100 1 °F (37 8 °C)] 97 9 °F (36 6 °C)  HR:  [76-84] 76  Resp:  [20] 20  BP: (100-126)/(50-78) 118/62  SpO2:  [91 %-100 %] 97 %  Body mass index is 26 21 kg/m²  Input and Output Summary (last 24 hours): Intake/Output Summary (Last 24 hours) at 2020 1802  Last data filed at 2020 1345  Gross per 24 hour   Intake 300 ml   Output --   Net 300 ml       Physical Exam:     Physical Exam   Constitutional: She is oriented to person, place, and time  No distress  Cardiovascular: Normal rate, regular rhythm and normal heart sounds  Pulmonary/Chest: Effort normal  She has no rales  Coarse breath sound left middle and lower lung fields  Musculoskeletal: She exhibits no edema  Neurological: She is alert and oriented to person, place, and time  Skin: Skin is warm and dry  She is not diaphoretic  Psychiatric: She has a normal mood and affect   Her behavior is normal          Additional Data:     Labs:    Results from last 7 days   Lab Units 20  0505  20  0505   WBC Thousand/uL 4 83   < > 2 97*   HEMOGLOBIN g/dL 9 0*   < > 10 6*   HEMATOCRIT % 26 6*   < > 31 7*   PLATELETS Thousands/uL 285   < > 139*   NEUTROS PCT %  --   --  46   LYMPHS PCT %  --   --  41   MONOS PCT %  --   --  13*   EOS PCT %  --   --  0    < > = values in this interval not displayed  Results from last 7 days   Lab Units 04/11/20  0505  04/06/20  0710   POTASSIUM mmol/L 3 4*   < > 3 6   CHLORIDE mmol/L 105   < > 99*   CO2 mmol/L 29   < > 30   BUN mg/dL 4*   < > 11   CREATININE mg/dL 0 63   < > 0 74   CALCIUM mg/dL 8 3   < > 8 8   ALK PHOS U/L  --   --  63   ALT U/L  --   --  25   AST U/L  --   --  19    < > = values in this interval not displayed  Recent Cultures (last 7 days):           Last 24 Hours Medication List:     Current Facility-Administered Medications:  acetaminophen 975 mg Oral Q6H PRN Connie Pate MD   aspirin 81 mg Oral Daily Connie Pate MD   benzonatate 100 mg Oral TID Radha Gloria MD   dextromethorphan-guaiFENesin 10 mL Oral Q4H Radha Gloria MD   enoxaparin 40 mg Subcutaneous Daily Connie Pate MD   fenofibrate 48 mg Oral Daily Connie Pate MD   gabapentin 100 mg Oral TID Connie Pate MD   insulin lispro 1-5 Units Subcutaneous TID Kg Moreno MD   insulin lispro 1-5 Units Subcutaneous HS Connie Pate MD   ondansetron 4 mg Intravenous Q6H PRN Major Sullivan MD   potassium chloride 40 mEq Oral Once Tucker Lara MD   pravastatin 40 mg Oral Daily With Valarie Kline MD        Today, Patient Was Seen By: Tucker Lara MD    ** Please Note: This note has been constructed using a voice recognition system   **

## 2020-04-11 NOTE — ASSESSMENT & PLAN NOTE
· Last A1c was was 6 2 in July 2019  Recheck A1c  · Start diabetic diet and ISS    · Hold home [place]

## 2020-04-11 NOTE — DISCHARGE SUMMARY
Discharge- Xavier Cardona 1970, 44 y o  female MRN: [id number]    Unit/Bed#: S -01 Encounter: [id number]    Primary Care Provider: Shilpi Amador MD   Date and time admitted to hospital: 4/6/2020  6:57 AM      * COVID-19 virus detected  Assessment & Plan  · POA  Confirmed COVID-19 positive at outside facility on 3/30/20  Symptoms started approximately 7 days ago with myalgias and febrile episodes at home with T-max up to 101  With dry cough, myalgias, fatigue and weakness  Denies any nausea or vomiting  Denies any diarrhea  Reports decreased p o  Intake due to not having an appetite  · Has been stable and saturating well on room air  · Patient reports no worsening shortness of breath  Cough is also improving  Appetite is also coming back  Patient is eager to go home today  · Procalcitonin normal  · Pulmonary on board, appreciate recommendations  · Will discharge on Robitussin and Tessalon Perles as needed for cough  · Follow-up with PCP for further outpatient workup  · Please follow guidelines regarding COVID-19 infection including self quarantine, social distant sitting, frequent hand washing, wiping of surfaces that you may have come into contact with frequently, no sharing of utensils  · Patient is stable for discharge today    Shortness of breath  Assessment & Plan  · Improved  In the setting of positive COVID-19 infection detected at outside facility on 3/30/20  Complaints of dry cough and progressive dyspnea  · Chest x-ray on admission showed no acute pulmonary process  Patient was saturating well on room air, initially 97% though had decreasing sats to 90%    · Overnight, has stable O2 sats on room air  · Completed dose of azithromycin and Plaquenil  · Can continue Robitussin and Tessalon Perles for cough  · Stable for discharge today  · Follow-up with PCP for further outpatient workup    Hyperlipidemia  Assessment & Plan  · Resume home pravastatin and fenofibrate    Prediabetes  Assessment & Plan  · Resume home meds  · Diabetic diet  · Follow-up with PCP/Endocrinology      Discharging Resident Physician: Leticia Cisneros MD  Attending: Jey Maria MD  PCP: Jennette Sicard, MD  Admission Date: 4/6/2020  Discharge Date: 04/12/20    Disposition:     Home    Reason for Admission: worsening shortness of breath    Consultations During Hospital Stay:  · Pulmonology    Procedures Performed:     · None    Significant Findings / Test Results:     · Chest xray: no acute cardiopulmonary disease  · Procalcitonin: normal    Incidental Findings:   · None     Test Results Pending at Discharge (will require follow up): · None     Outpatient Tests Requested:  · Follow up with PCP for further outpatient workup    Complications:  None    Hospital Course:     Shayla Espana is a 44 y o  female patient who originally presented to the hospital on 4/6/2020 due to worsening shortness of breath  Patient experienced flu like symptoms including fever, myalgias and decreased appetite 1 week prior to admission  She had herself checked at an outside facility and she tested positive for COVID 19 infection  Patient states she felt okay until she stated noticing worsening shortness of breath and dry cough prompting her to go to the ED hence subsequent admission  She was monitored and was placed on O2 supplementation  Patient was also started on azithromycin and plaquenil  Pulmonology was consulted for further evaluation and management  Patient's EKG was monitored  Patient had fluctuating O2 needs, was eventually weaned off O2 and was noted to be stable and saturating well on room air  Patient finished course of azithromycin and Plaquenil  Patient will be discharged on Robitussin and Tessalon Perles as needed for cough  Instructed the patient to follow-up with her PCP for monitoring    Instructed the patient to also call her PCP if she has worsening cough, shortness of breath, worsening diarrhea  Patient is eager to go home today  Patient is stable for discharge  Condition at Discharge: stable     Discharge Day Visit / Exam:     Subjective:  Patient seen and examined today  Patient reported that cough is improving and that she did not have any episodes of shortness of breath overnight  Her sats have been stable on room air all throughout the night  Patient feels like her appetite is coming back  Patient is eager to go home  Vitals: Blood Pressure: 122/80 (04/12/20 0700)  Pulse: 78 (04/12/20 0700)  Temperature: 97 6 °F (36 4 °C) (04/12/20 0700)  Temp Source: Oral (04/12/20 0700)  Respirations: 18 (04/12/20 0700)  Height: 5' 2" (157 5 cm) (04/06/20 1240)  Weight - Scale: 65 kg (143 lb 4 8 oz) (04/08/20 1712)  SpO2: 94 % (04/12/20 0800)  Exam:   Physical Exam   Constitutional: She is oriented to person, place, and time  No distress  HENT:   Head: Normocephalic and atraumatic  Eyes: Conjunctivae are normal  No scleral icterus  Neck: Normal range of motion  No JVD present  Cardiovascular: Normal rate, regular rhythm, normal heart sounds and intact distal pulses  Pulmonary/Chest: Effort normal and breath sounds normal  No respiratory distress  Abdominal: Soft  Bowel sounds are normal  There is no tenderness  There is no guarding  Musculoskeletal: Normal range of motion  She exhibits no edema or tenderness  Lymphadenopathy:     She has no cervical adenopathy  Neurological: She is alert and oriented to person, place, and time  She exhibits normal muscle tone  Skin: Skin is warm  No rash noted  She is not diaphoretic  No erythema  Psychiatric: She has a normal mood and affect  Her behavior is normal  Thought content normal    Nursing note and vitals reviewed        Discussion with Family:  Discussed with the patient, patient stated that she will update her family members    Discharge instructions/Information to patient and family:   See after visit summary for information provided to patient and family  Provisions for Follow-Up Care:  See after visit summary for information related to follow-up care and any pertinent home health orders  Planned Readmission: None     Discharge Medications:  See after visit summary for reconciled discharge medications provided to patient and family        ** Please Note: This note has been constructed using a voice recognition system **

## 2020-04-11 NOTE — ASSESSMENT & PLAN NOTE
· POA  Confirmed COVID-19 positive at outside facility on 3/30/20  Symptoms started approximately 7 days ago with myalgias and febrile episodes at home with T-max up to 101  With dry cough, myalgias, fatigue and weakness  Denies any nausea or vomiting  Denies any diarrhea  Reports decreased p o  Intake due to not having an appetite  · Was doing well yesterday with weaning off O2, was also saturating well on room air  However around 11:00 p m  Last night, patient complained of shortness of breath  Was placed on 1 L with good response, however despite being on 2 L has been saturating around 91%  Cough has mildly improved  Patient however still complaining of shortness of breath  · Zofran p r n  · Tessalon Perles  · Procalcitonin normal  · Pulmonary on board, appreciate recommendations  · If O2 requirements continues to increase, or if with symptomatic decline, would check for inflammatory markers  · Continue with Plaquenil and azithromycin  · Would again try to wean off O2 today, would monitor on room air  Hopefully by tomorrow patient would show good clinical response and would be stable for discharge    · Check O2 sat on RA

## 2020-04-12 VITALS
HEIGHT: 62 IN | OXYGEN SATURATION: 100 % | TEMPERATURE: 97.6 F | WEIGHT: 143.3 LBS | DIASTOLIC BLOOD PRESSURE: 80 MMHG | HEART RATE: 78 BPM | RESPIRATION RATE: 18 BRPM | SYSTOLIC BLOOD PRESSURE: 122 MMHG | BODY MASS INDEX: 26.37 KG/M2

## 2020-04-12 PROBLEM — E87.6 HYPOKALEMIA: Status: RESOLVED | Noted: 2020-04-11 | Resolved: 2020-04-12

## 2020-04-12 LAB
ALBUMIN SERPL BCP-MCNC: 2.5 G/DL (ref 3.5–5)
ALP SERPL-CCNC: 62 U/L (ref 46–116)
ALT SERPL W P-5'-P-CCNC: 25 U/L (ref 12–78)
ANION GAP SERPL CALCULATED.3IONS-SCNC: 8 MMOL/L (ref 4–13)
AST SERPL W P-5'-P-CCNC: 15 U/L (ref 5–45)
BASOPHILS # BLD AUTO: 0.01 THOUSANDS/ΜL (ref 0–0.1)
BASOPHILS NFR BLD AUTO: 0 % (ref 0–1)
BILIRUB SERPL-MCNC: 0.41 MG/DL (ref 0.2–1)
BUN SERPL-MCNC: 3 MG/DL (ref 5–25)
CALCIUM SERPL-MCNC: 8.6 MG/DL (ref 8.3–10.1)
CHLORIDE SERPL-SCNC: 106 MMOL/L (ref 100–108)
CO2 SERPL-SCNC: 29 MMOL/L (ref 21–32)
CREAT SERPL-MCNC: 0.6 MG/DL (ref 0.6–1.3)
EOSINOPHIL # BLD AUTO: 0.13 THOUSAND/ΜL (ref 0–0.61)
EOSINOPHIL NFR BLD AUTO: 3 % (ref 0–6)
ERYTHROCYTE [DISTWIDTH] IN BLOOD BY AUTOMATED COUNT: 11.9 % (ref 11.6–15.1)
GFR SERPL CREATININE-BSD FRML MDRD: 106 ML/MIN/1.73SQ M
GLUCOSE SERPL-MCNC: 103 MG/DL (ref 65–140)
GLUCOSE SERPL-MCNC: 105 MG/DL (ref 65–140)
GLUCOSE SERPL-MCNC: 139 MG/DL (ref 65–140)
HCT VFR BLD AUTO: 28.9 % (ref 34.8–46.1)
HGB BLD-MCNC: 9.8 G/DL (ref 11.5–15.4)
IMM GRANULOCYTES # BLD AUTO: 0.02 THOUSAND/UL (ref 0–0.2)
IMM GRANULOCYTES NFR BLD AUTO: 0 % (ref 0–2)
LYMPHOCYTES # BLD AUTO: 1.16 THOUSANDS/ΜL (ref 0.6–4.47)
LYMPHOCYTES NFR BLD AUTO: 24 % (ref 14–44)
MCH RBC QN AUTO: 30.7 PG (ref 26.8–34.3)
MCHC RBC AUTO-ENTMCNC: 33.9 G/DL (ref 31.4–37.4)
MCV RBC AUTO: 91 FL (ref 82–98)
MONOCYTES # BLD AUTO: 0.52 THOUSAND/ΜL (ref 0.17–1.22)
MONOCYTES NFR BLD AUTO: 11 % (ref 4–12)
NEUTROPHILS # BLD AUTO: 3.07 THOUSANDS/ΜL (ref 1.85–7.62)
NEUTS SEG NFR BLD AUTO: 62 % (ref 43–75)
NRBC BLD AUTO-RTO: 0 /100 WBCS
PLATELET # BLD AUTO: 344 THOUSANDS/UL (ref 149–390)
PMV BLD AUTO: 11 FL (ref 8.9–12.7)
POTASSIUM SERPL-SCNC: 4 MMOL/L (ref 3.5–5.3)
PROT SERPL-MCNC: 6.4 G/DL (ref 6.4–8.2)
RBC # BLD AUTO: 3.19 MILLION/UL (ref 3.81–5.12)
SODIUM SERPL-SCNC: 143 MMOL/L (ref 136–145)
WBC # BLD AUTO: 4.91 THOUSAND/UL (ref 4.31–10.16)

## 2020-04-12 PROCEDURE — 82948 REAGENT STRIP/BLOOD GLUCOSE: CPT

## 2020-04-12 PROCEDURE — 99239 HOSP IP/OBS DSCHRG MGMT >30: CPT | Performed by: INTERNAL MEDICINE

## 2020-04-12 PROCEDURE — 85025 COMPLETE CBC W/AUTO DIFF WBC: CPT | Performed by: INTERNAL MEDICINE

## 2020-04-12 PROCEDURE — 80053 COMPREHEN METABOLIC PANEL: CPT | Performed by: INTERNAL MEDICINE

## 2020-04-12 RX ORDER — GUAIFENESIN/DEXTROMETHORPHAN 100-10MG/5
10 SYRUP ORAL EVERY 4 HOURS
Qty: 118 ML | Refills: 0 | Status: SHIPPED | OUTPATIENT
Start: 2020-04-12

## 2020-04-12 RX ORDER — BENZONATATE 100 MG/1
100 CAPSULE ORAL 3 TIMES DAILY
Qty: 20 CAPSULE | Refills: 0 | Status: SHIPPED | OUTPATIENT
Start: 2020-04-12

## 2020-04-12 RX ADMIN — ASPIRIN 81 MG 81 MG: 81 TABLET ORAL at 08:55

## 2020-04-12 RX ADMIN — GABAPENTIN 100 MG: 100 CAPSULE ORAL at 08:55

## 2020-04-12 RX ADMIN — GUAIFENESIN AND DEXTROMETHORPHAN 10 ML: 100; 10 SYRUP ORAL at 12:56

## 2020-04-12 RX ADMIN — ENOXAPARIN SODIUM 40 MG: 40 INJECTION SUBCUTANEOUS at 08:55

## 2020-04-12 RX ADMIN — FENOFIBRATE 48 MG: 48 TABLET, FILM COATED ORAL at 08:55

## 2020-04-12 RX ADMIN — ACETAMINOPHEN 975 MG: 325 TABLET, FILM COATED ORAL at 04:16

## 2020-04-12 RX ADMIN — GUAIFENESIN AND DEXTROMETHORPHAN 10 ML: 100; 10 SYRUP ORAL at 08:55

## 2020-04-12 RX ADMIN — BENZONATATE 100 MG: 100 CAPSULE ORAL at 08:55

## 2020-04-12 RX ADMIN — GUAIFENESIN AND DEXTROMETHORPHAN 10 ML: 100; 10 SYRUP ORAL at 04:15

## 2020-04-12 NOTE — ASSESSMENT & PLAN NOTE
· Improved  In the setting of positive COVID-19 infection detected at outside facility on 3/30/20  Complaints of dry cough and progressive dyspnea  · Chest x-ray on admission showed no acute pulmonary process  Patient was saturating well on room air, initially 97% though had decreasing sats to 90%    · Overnight, has stable O2 sats on room air  · Completed dose of azithromycin and Plaquenil  · Can continue Robitussin and Tessalon Perles for cough  · Stable for discharge today  · Follow-up with PCP for further outpatient workup

## 2020-04-12 NOTE — DISCHARGE INSTRUCTIONS
You were treated for Novel Coronavirus (COVID-19)  Once you have fully recovered from COVID-19, you may be able to help patients currently fighting the infection by donating plasma  This is the same process as donating blood  There are case reports about treating COVID-19 patients with convalescent plasma  This treatment is experimental, but doctors hope it will be lifesaving for many seriously ill patients  When a person becomes ill with COVID-19, it can take a patient time to develop antibodies needed to combat the disease  While some patients can become seriously ill and require the assistance of a ventilator, 80% of those diagnosed do not become seriously ill  These patients recover and have antibodies in their plasma  By collecting this plasma and giving it to ill patients we hope we can provide a boost to the patient's antibodies and help stimulate recovery  This is particularly important in patients who have a suppressed immune system  If you are willing to be a donor, please visit [facility] Website at [email or web address]  or call Nuzhat Brink at [phone] Ext  [phone]  You may also contact Katelynn at [phone] for additional details

## 2020-04-12 NOTE — ASSESSMENT & PLAN NOTE
· POA  Confirmed COVID-19 positive at outside facility on 3/30/20  Symptoms started approximately 7 days ago with myalgias and febrile episodes at home with T-max up to 101  With dry cough, myalgias, fatigue and weakness  Denies any nausea or vomiting  Denies any diarrhea  Reports decreased p o  Intake due to not having an appetite  · Has been stable and saturating well on room air  · Patient reports no worsening shortness of breath  Cough is also improving  Appetite is also coming back    Patient is eager to go home today  · Procalcitonin normal  · Pulmonary on board, appreciate recommendations  · Will discharge on Robitussin and Tessalon Perles as needed for cough  · Follow-up with PCP for further outpatient workup  · Please follow guidelines regarding COVID-19 infection including self quarantine, social distant sitting, frequent hand washing, wiping of surfaces that you may have come into contact with frequently, no sharing of utensils  · Patient is stable for discharge today

## 2020-04-12 NOTE — PLAN OF CARE
Problem: RESPIRATORY - ADULT  Goal: Achieves optimal ventilation and oxygenation  Description  INTERVENTIONS:  - Assess for changes in respiratory status  - Assess for changes in mentation and behavior  - Position to facilitate oxygenation and minimize respiratory effort  - Oxygen administered by appropriate delivery if ordered  - Initiate smoking cessation education as indicated  - Encourage broncho-pulmonary hygiene including cough, deep breathe, Incentive Spirometry  - Assess the need for suctioning and aspirate as needed  - Assess and instruct to report SOB or any respiratory difficulty  - Respiratory Therapy support as indicated  Outcome: Adequate for Discharge     Problem: INFECTION - ADULT  Goal: Absence or prevention of progression during hospitalization  Description  INTERVENTIONS:  - Assess and monitor for signs and symptoms of infection  - Monitor lab/diagnostic results  - Monitor all insertion sites, i e  indwelling lines, tubes, and drains  - Monitor endotracheal if appropriate and nasal secretions for changes in amount and color  - [place] appropriate cooling/warming therapies per order  - Administer medications as ordered  - Instruct and encourage patient and family to use good hand hygiene technique  - Identify and instruct in appropriate isolation precautions for identified infection/condition  Outcome: Adequate for Discharge     Problem: Nutrition/Hydration-ADULT  Goal: Nutrient/Hydration intake appropriate for improving, restoring or maintaining nutritional needs  Description  Monitor and assess patient's nutrition/hydration status for malnutrition  Collaborate with interdisciplinary team and initiate plan and interventions as ordered  Monitor patient's weight and dietary intake as ordered or per policy  Utilize nutrition screening tool and intervene as necessary  Determine patient's food preferences and provide high-protein, high-caloric foods as appropriate       INTERVENTIONS:  - Monitor oral intake, urinary output, labs, and treatment plans  - Assess nutrition and hydration status and recommend course of action  - Evaluate amount of meals eaten  - Assist patient with eating if necessary   - Allow adequate time for meals  - Recommend/ encourage appropriate diets, oral nutritional supplements, and vitamin/mineral supplements  - Order, calculate, and assess calorie counts as needed  - Recommend, monitor, and adjust tube feedings and TPN/PPN based on assessed needs  - Assess need for intravenous fluids  - Provide specific nutrition/hydration education as appropriate  - Include patient/family/caregiver in decisions related to nutrition  Outcome: Adequate for Discharge

## 2020-04-14 NOTE — UTILIZATION REVIEW
Notification of Discharge  This is a Notification of Discharge from our facility [address]  Please be advised that this patient has been discharge from our facility  Below you will find the admission and discharge date and time including the patients disposition  PRESENTATION DATE: 4/6/2020  6:57 AM  OBS ADMISSION DATE:   IP ADMISSION DATE: 4/7/20 1602   DISCHARGE DATE: 4/12/2020  1:30 PM  DISPOSITION: Home/Self Care Home/Self Care   Admission Orders listed below:  Admission Orders (From admission, onward)     Ordered        04/07/20 1602  Inpatient Admission  Once         04/06/20 0905  Place in Observation  Once                   Please contact the UR Department if additional information is required to close this patient's authorization/case  [address] [facility] Utilization Review Department  Main: [phone] x carefully listen to the prompts  All voicemails are confidential   [email or web address]  Send all requests for admission clinical reviews, approved or denied determinations and any other requests to dedicated fax number below belonging to the campus where the patient is receiving treatment   List of dedicated fax numbers:  [address] DENIALS (Administrative/Medical Necessity) [phone]   [address] (Maternity/NICU/Pediatrics) [phone]   Kennedy Millern [phone]   [facility] [phone]   Jackie Hooper [phone]    [facility] [address] [facility] [phone]   [facility]  [phone]   [address] [facility] [address] [facility]  [address] [facility] [address] [facility] [phone]

## 2024-10-08 ENCOUNTER — ANNUAL EXAM (OUTPATIENT)
Dept: OBGYN CLINIC | Facility: CLINIC | Age: 54
End: 2024-10-08
Payer: COMMERCIAL

## 2024-10-08 VITALS
HEIGHT: 62 IN | DIASTOLIC BLOOD PRESSURE: 70 MMHG | BODY MASS INDEX: 27.49 KG/M2 | SYSTOLIC BLOOD PRESSURE: 132 MMHG | WEIGHT: 149.4 LBS

## 2024-10-08 DIAGNOSIS — N92.0 MENORRHAGIA WITH REGULAR CYCLE: Primary | ICD-10-CM

## 2024-10-08 DIAGNOSIS — Z01.419 ENCOUNTER FOR GYNECOLOGICAL EXAMINATION WITHOUT ABNORMAL FINDING: ICD-10-CM

## 2024-10-08 PROCEDURE — 99386 PREV VISIT NEW AGE 40-64: CPT | Performed by: OBSTETRICS & GYNECOLOGY

## 2024-10-08 RX ORDER — TIRZEPATIDE 5 MG/.5ML
INJECTION, SOLUTION SUBCUTANEOUS
COMMUNITY
Start: 2024-09-11

## 2024-10-08 RX ORDER — TIRZEPATIDE 7.5 MG/.5ML
7.5 INJECTION, SOLUTION SUBCUTANEOUS
COMMUNITY
Start: 2024-09-18

## 2024-10-08 RX ORDER — MECLIZINE HYDROCHLORIDE 25 MG/1
25 TABLET ORAL 3 TIMES DAILY PRN
COMMUNITY
Start: 2024-07-10 | End: 2025-07-10

## 2024-10-08 RX ORDER — FENOFIBRATE 145 MG/1
145 TABLET, COATED ORAL DAILY
COMMUNITY
Start: 2024-09-13

## 2024-10-08 RX ORDER — LISINOPRIL 10 MG/1
10 TABLET ORAL DAILY
COMMUNITY
Start: 2024-07-10

## 2024-10-08 NOTE — PROGRESS NOTES
Ambulatory Visit  Name: Yolanda Hubbard      : 1970      MRN: [id number]  Encounter Provider: Zuri Lyon MD  Encounter Date: 10/8/2024   Encounter department: [facility]    Assessment & Plan  Menorrhagia with regular cycle    Orders:    US pelvis complete w transvaginal; Future    Encounter for gynecological examination without abnormal finding           History of Present Illness     Yolanda Hubbard is a 53 y.o. female who presents for evaluation of abnormal uterine bleeding.  She has been bleeding daily for approximately 2 years.  She had a history of abnormal bleeding beginning with 2 years of amenorrhea followed by almost constant bleeding.  Her Pap smears have always been normal.  She had an ultrasound and was giving consideration to an endometrial ablation approximately 4 years ago when COVID began and has not been seen by gynecologist since that time.  Her recent CBC was within normal limits with a hemoglobin of 12.  A Pap smear was done today.  We will repeat the ultrasound.  She has no pain or other symptoms.  Her major ongoing medical problem is her diabetes for which she has a strong family history.  We will contact her when the results become available.      Review of Systems   Constitutional:  Negative for chills, diaphoresis, fatigue, fever and unexpected weight change.   HENT:  Negative for congestion, sinus pressure, sinus pain, tinnitus and trouble swallowing.    Eyes:  Negative for visual disturbance.   Respiratory:  Negative for cough, chest tightness and shortness of breath.    Cardiovascular:  Negative for chest pain, palpitations and leg swelling.   Gastrointestinal:  Negative for abdominal distention, abdominal pain, anal bleeding, constipation, diarrhea, nausea, rectal pain and vomiting.   Endocrine: Negative for heat intolerance.   Genitourinary:  Negative for difficulty urinating, dysuria, flank pain, frequency, genital sores, hematuria and urgency.  "  Musculoskeletal:  Negative for arthralgias, back pain and joint swelling.   Skin:  Negative for rash.   Allergic/Immunologic: Negative for environmental allergies and food allergies.   Neurological:  Negative for headaches.   Hematological:  Negative for adenopathy. Does not bruise/bleed easily.   Psychiatric/Behavioral:  Negative for decreased concentration and dysphoric mood. The patient is not nervous/anxious.            Objective     /70 (BP Location: Left arm, Patient Position: Sitting, Cuff Size: Adult)   Ht 5' 2\" (1.575 m)   Wt 67.8 kg (149 lb 6.4 oz)   BMI 27.33 kg/m²     Physical Exam  Vitals reviewed. Exam conducted with a chaperone present.   Constitutional:       Appearance: Normal appearance.   HENT:      Mouth/Throat:      Mouth: Mucous membranes are moist.      Pharynx: Oropharynx is clear.   Eyes:      Conjunctiva/sclera: Conjunctivae normal.      Pupils: Pupils are equal, round, and reactive to light.   Cardiovascular:      Rate and Rhythm: Normal rate and regular rhythm.      Pulses: Normal pulses.      Heart sounds: Normal heart sounds.   Pulmonary:      Effort: Pulmonary effort is normal.      Breath sounds: Normal breath sounds.   Abdominal:      General: Bowel sounds are normal.      Palpations: Abdomen is soft.   Genitourinary:     General: Normal vulva.      Exam position: Lithotomy position.      Eloy stage (genital): 5.      Vagina: Normal.      Cervix: Normal.      Uterus: Normal.       Adnexa: Right adnexa normal and left adnexa normal.      Rectum: Normal.   Musculoskeletal:         General: Normal range of motion.      Cervical back: Neck supple.   Skin:     General: Skin is warm and dry.   Neurological:      General: No focal deficit present.      Mental Status: She is alert and oriented to person, place, and time.   Psychiatric:         Mood and Affect: Mood normal.         "

## 2024-10-29 ENCOUNTER — HOSPITAL ENCOUNTER (OUTPATIENT)
Dept: ULTRASOUND IMAGING | Facility: HOSPITAL | Age: 54
Discharge: HOME | End: 2024-10-29
Attending: OBSTETRICS & GYNECOLOGY
Payer: COMMERCIAL

## 2024-10-29 DIAGNOSIS — N92.0 MENORRHAGIA WITH REGULAR CYCLE: ICD-10-CM

## 2024-10-29 PROCEDURE — 76830 TRANSVAGINAL US NON-OB: CPT

## 2024-10-29 PROCEDURE — 76856 US EXAM PELVIC COMPLETE: CPT

## 2024-11-04 ENCOUNTER — TELEPHONE (OUTPATIENT)
Age: 54
End: 2024-11-04

## 2024-11-04 NOTE — TELEPHONE ENCOUNTER
"Patient calling in for ultrasound results. States she is concerned about results and also unsure why is states unable to see one of her ovaries. Ultrasound report reviewed and marked as \"significant notification.\"    Routing to provider and clinical pool as fyi.  "

## 2024-11-05 ENCOUNTER — NURSE TRIAGE (OUTPATIENT)
Age: 54
End: 2024-11-05

## 2024-11-05 NOTE — TELEPHONE ENCOUNTER
Provider already reviewed results- patient needs an EMB.     Called patient, phone rang but didn't go to VM

## 2024-11-05 NOTE — TELEPHONE ENCOUNTER
Regarding: significant findings  ----- Message from Ailyn GUZMAN sent at 11/5/2024  9:20 AM EST -----  Significant findings on pelvic u/s done 10/29.

## 2024-11-12 ENCOUNTER — PROCEDURE VISIT (OUTPATIENT)
Dept: OBGYN CLINIC | Facility: CLINIC | Age: 54
End: 2024-11-12
Payer: COMMERCIAL

## 2024-11-12 VITALS
HEIGHT: 62 IN | SYSTOLIC BLOOD PRESSURE: 130 MMHG | DIASTOLIC BLOOD PRESSURE: 78 MMHG | BODY MASS INDEX: 26.46 KG/M2 | WEIGHT: 143.8 LBS

## 2024-11-12 DIAGNOSIS — N95.0 PMB (POSTMENOPAUSAL BLEEDING): Primary | ICD-10-CM

## 2024-11-12 PROCEDURE — 58100 BIOPSY OF UTERUS LINING: CPT | Performed by: OBSTETRICS & GYNECOLOGY

## 2024-11-12 NOTE — PROGRESS NOTES
"Endometrial biopsy    Date/Time: 11/12/2024 11:31 AM    Performed by: Zuri Lyon MD  Authorized by: Zuri Lyon MD  Universal Protocol:  procedure performed by consultantConsent: Verbal consent obtained. Written consent obtained.  Risks and benefits: risks, benefits and alternatives were discussed  Consent given by: patient  Time out: Immediately prior to procedure a \"time out\" was called to verify the correct patient, procedure, equipment, support staff and site/side marked as required.  Patient understanding: patient states understanding of the procedure being performed  Patient consent: the patient's understanding of the procedure matches consent given  Procedure consent: procedure consent matches procedure scheduled  Relevant documents: relevant documents present and verified  Test results: test results available and properly labeled  Site marked: the operative site was not marked  Radiology Images displayed and confirmed. If images not available, report reviewed: imaging studies available  Required items: required blood products, implants, devices, and special equipment available  Patient identity confirmed: verbally with patient    Indication:     Indications: Post-menopausal bleeding      Chronicity of post-menopausal bleeding:  New    Progression of post-menopausal bleeding:  Unchanged  Procedure:     Procedure: endometrial biopsy with P[place]      A bivalve speculum was placed in the vagina: yes      Cervix cleaned and prepped: yes      A paracervical block was performed: no      An intracervical block was performed: no      The cervix was dilated: no      Uterus sounded: yes      Specimen collected: specimen collected and sent to pathology      Patient tolerated procedure well with no complications: yes    Findings:     Uterus size:  Non-gravid    Cervix: normal      Adnexa: normal        "

## 2024-11-18 ENCOUNTER — RESULTS FOLLOW-UP (OUTPATIENT)
Dept: OBGYN CLINIC | Facility: CLINIC | Age: 54
End: 2024-11-18

## 2024-11-19 ENCOUNTER — TELEPHONE (OUTPATIENT)
Age: 54
End: 2024-11-19

## 2024-11-19 NOTE — TELEPHONE ENCOUNTER
Patient called in for pathology results, and was notified of Dr Black's recommendations, attempted to schedule appt, unable to schedule appt as there isn't availability, contacted Ousmane in the office and pt was warm transferred

## 2024-11-26 ENCOUNTER — OFFICE VISIT (OUTPATIENT)
Dept: OBGYN CLINIC | Facility: CLINIC | Age: 54
End: 2024-11-26
Payer: COMMERCIAL

## 2024-11-26 ENCOUNTER — TELEPHONE (OUTPATIENT)
Dept: OBGYN CLINIC | Facility: CLINIC | Age: 54
End: 2024-11-26

## 2024-11-26 VITALS
WEIGHT: 148.8 LBS | HEIGHT: 62 IN | SYSTOLIC BLOOD PRESSURE: 114 MMHG | BODY MASS INDEX: 27.38 KG/M2 | DIASTOLIC BLOOD PRESSURE: 68 MMHG

## 2024-11-26 DIAGNOSIS — N95.0 PMB (POSTMENOPAUSAL BLEEDING): Primary | ICD-10-CM

## 2024-11-26 PROCEDURE — 99213 OFFICE O/P EST LOW 20 MIN: CPT | Performed by: OBSTETRICS & GYNECOLOGY

## 2024-11-26 RX ORDER — AZITHROMYCIN 250 MG/1
TABLET, FILM COATED ORAL
COMMUNITY
Start: 2024-11-20

## 2024-11-26 NOTE — TELEPHONE ENCOUNTER
.Called and spoke with pt.   Surgery is now scheduled.   Please see the [facility] OB GYN Department Surgery Scheduling Sheet in this encounter for further information.

## 2024-11-26 NOTE — PROGRESS NOTES
"Name: Yolanda Hubbard      : 1970      MRN: [id number]  Encounter Provider: Zuri Lyon MD  Encounter Date: 2024   Encounter department: [facility] [place] [facility]  :  Assessment & Plan  PMB (postmenopausal bleeding)             History of Present Illness     HPI  Yolanda Hubbard is a 53 y.o. female who presents with a history of postmenopausal bleeding for approximately 2 years.  An ultrasound revealed a markedly thickened endometrial stripe of 19 mm.  Atypical cells were observed on endometrial biopsy.  After discussion of options, risk and benefits the patient will undergo a D&C hysteroscopy.'s are signed.  All of her questions were answered.      Review of Systems   Constitutional:  Negative for chills, diaphoresis, fatigue, fever and unexpected weight change.   HENT:  Negative for congestion, sinus pressure, sinus pain, tinnitus and trouble swallowing.    Eyes:  Negative for visual disturbance.   Respiratory:  Negative for cough, chest tightness and shortness of breath.    Cardiovascular:  Negative for chest pain, palpitations and leg swelling.   Gastrointestinal:  Negative for abdominal distention, abdominal pain, anal bleeding, constipation, diarrhea, nausea, rectal pain and vomiting.   Endocrine: Negative for heat intolerance.   Genitourinary:  Negative for difficulty urinating, dysuria, flank pain, frequency, genital sores, hematuria and urgency.   Musculoskeletal:  Negative for arthralgias, back pain and joint swelling.   Skin:  Negative for rash.   Allergic/Immunologic: Negative for environmental allergies and food allergies.   Neurological:  Negative for headaches.   Hematological:  Negative for adenopathy. Does not bruise/bleed easily.   Psychiatric/Behavioral:  Negative for decreased concentration and dysphoric mood. The patient is not nervous/anxious.           Objective   /68 (BP Location: Left arm, Patient Position: Sitting, Cuff Size: Adult)   Ht 5' 2\" (1.575 m)   Wt 67.5 kg " (148 lb 12.8 oz)   BMI 27.22 kg/m²      Physical Exam  Vitals reviewed. Exam conducted with a chaperone present.   Constitutional:       Appearance: Normal appearance.   HENT:      Mouth/Throat:      Mouth: Mucous membranes are moist.      Pharynx: Oropharynx is clear.   Eyes:      Conjunctiva/sclera: Conjunctivae normal.      Pupils: Pupils are equal, round, and reactive to light.   Cardiovascular:      Rate and Rhythm: Normal rate and regular rhythm.      Pulses: Normal pulses.      Heart sounds: Normal heart sounds.   Pulmonary:      Effort: Pulmonary effort is normal.      Breath sounds: Normal breath sounds.   Abdominal:      General: Bowel sounds are normal.      Palpations: Abdomen is soft.   Genitourinary:     General: Normal vulva.      Exam position: Lithotomy position.      Eloy stage (genital): 5.      Vagina: Normal.      Cervix: Normal.      Uterus: Normal.       Adnexa: Right adnexa normal and left adnexa normal.      Rectum: Normal.   Musculoskeletal:         General: Normal range of motion.      Cervical back: Neck supple.   Skin:     General: Skin is warm and dry.   Neurological:      General: No focal deficit present.      Mental Status: She is alert and oriented to person, place, and time.   Psychiatric:         Mood and Affect: Mood normal.

## 2024-11-26 NOTE — TELEPHONE ENCOUNTER
----- Message from Zuri Lyon MD sent at 2024  1:12 PM EST -----  [facility] GYN Department  Surgery Scheduling Sheet    Patient Name: Yolanda Hubbard  : 1970    Provider: Zuri Lyon MD     Needed: no; Language: N/A    Procedure: exam under anesthesia, dilation and curettage , and hysteroscopy    Diagnosis: pmb    Special Needs or Equipment: symphion    Anesthesia: IV sedation with anesthesia    Length of stay: outpatient  Does patient have comorbid conditions that will require close perioperative monitoring prior to safe discharge: no    -The patient has comorbid conditions that will require close perioperative monitoring prior to safe discharge, including N/A.   -This may require acute care beyond the usual and routine recovery period. As such, inpatient admission post-operatively is expected and appropriate, and anticipated hospital length of stay will be >2 midnights.    Pre-Admission Testing Needed: no   Labs that should be ordered: NA    Order PAT that is recommended in prep for procedure?: No    Medical Clearance Needed: no; Provider: N/A    MA Form Signed (tubals/hysterectomy): Not Indicated    Surgical Drink Given: no     How many days out of work: 2 day(s)     How many days no drivin day(s)       Is pre op appt needed?  no  Interval for post op appt: 2 week(s)       For Surgical Scheduler:     Surgery Scheduled On: MON. 24-MORNING  [place]: [facility]    Pre-op Appt: COMPLETED 24  Post op Appt: 25  Consult/Medical clearance appt: N/A

## 2024-12-05 ENCOUNTER — ANESTHESIA EVENT (OUTPATIENT)
Dept: PERIOP | Facility: HOSPITAL | Age: 54
End: 2024-12-05
Payer: COMMERCIAL

## 2024-12-05 NOTE — PRE-PROCEDURE INSTRUCTIONS
Pre-Surgery Instructions:   Medication Instructions    fenofibrate (TRICOR) 145 mg tablet Take night before surgery    gabapentin (NEURONTIN) 100 mg capsule Take day of surgery.    lisinopril (ZESTRIL) 10 mg tablet Take night before surgery    Mounjaro 7.5 MG/0.5ML Stop taking 7 days prior to surgery.    pravastatin (PRAVACHOL) 40 mg tablet Take night before surgery   Medication instructions for day surgery reviewed. Please use only a sip of water to take your instructed medications. Avoid all over the counter vitamins, supplements and NSAIDS for one week prior to surgery per anesthesia guidelines. Tylenol is ok to take as needed.     You will receive a call one business day prior to surgery with an arrival time and hospital directions. If your surgery is scheduled on a Monday, the hospital will be calling you on the Friday prior to your surgery. If you have not heard from anyone by 8pm, please call the hospital supervisor through the hospital  at [phone]. ([place] [phone] or [place] [phone]).    Do not eat or drink anything after midnight the night before your surgery, including candy, mints, lifesavers, or chewing gum. Do not drink alcohol 24hrs before your surgery. Try not to smoke at least 24hrs before your surgery.       Follow the pre surgery showering instructions as listed in the “My Surgical Experience Booklet” or otherwise provided by your surgeon's office. Do not use a blade to shave the surgical area 1 week before surgery. It is okay to use a clean electric clippers up to 24 hours before surgery. Do not apply any lotions, creams, including makeup, cologne, deodorant, or perfumes after showering on the day of your surgery. Do not use dry shampoo, hair spray, hair gel, or any type of hair products.     No contact lenses, eye make-up, or artificial eyelashes. Remove nail polish, including gel polish, and any artificial, gel, or acrylic nails if possible. Remove all jewelry including  rings and body piercing jewelry.     Wear causal clothing that is easy to take on and off. Consider your type of surgery.    Keep any valuables, jewelry, piercings at home. Please bring any specially ordered equipment (sling, braces) if indicated.    Arrange for a responsible person to drive you to and from the hospital on the day of your surgery. Please confirm the visitor policy for the day of your procedure when you receive your phone call with an arrival time.     Call the surgeon's office with any new illnesses, exposures, or additional questions prior to surgery.    Please reference your “My Surgical Experience Booklet” for additional information to prepare for your upcoming surgery.

## 2024-12-12 PROCEDURE — NC001 PR NO CHARGE: Performed by: OBSTETRICS & GYNECOLOGY

## 2024-12-12 NOTE — H&P
History of Present Illness     Yolanda Hubbard is a 53 y.o. female who presents for evaluation of abnormal uterine bleeding.  She has been bleeding daily for approximately 2 years.  She had a history of abnormal bleeding beginning with 2 years of amenorrhea followed by almost constant bleeding.  Her Pap smears have always been normal.  She had an ultrasound and was giving consideration to an endometrial ablation approximately 4 years ago when COVID began and has not been seen by gynecologist since that time.  Her recent CBC was within normal limits with a hemoglobin of 12.  A Pap smear was wnl. An ultrasound revealed a markedly thickened endometrial stripe of 19 mm. Atypical cells were observed on endometrial biopsy. After discussion of options, risk and benefits the patient will undergo a D&C hysteroscopy.'s are signed. All of her questions were answered.          Review of Systems   Constitutional:  Negative for chills, diaphoresis, fatigue, fever and unexpected weight change.   HENT:  Negative for congestion, sinus pressure, sinus pain, tinnitus and trouble swallowing.    Eyes:  Negative for visual disturbance.   Respiratory:  Negative for cough, chest tightness and shortness of breath.    Cardiovascular:  Negative for chest pain, palpitations and leg swelling.   Gastrointestinal:  Negative for abdominal distention, abdominal pain, anal bleeding, constipation, diarrhea, nausea, rectal pain and vomiting.   Endocrine: Negative for heat intolerance.   Genitourinary:  Negative for difficulty urinating, dysuria, flank pain, frequency, genital sores, hematuria and urgency.   Musculoskeletal:  Negative for arthralgias, back pain and joint swelling.   Skin:  Negative for rash.   Allergic/Immunologic: Negative for environmental allergies and food allergies.   Neurological:  Negative for headaches.   Hematological:  Negative for adenopathy. Does not bruise/bleed easily.   Psychiatric/Behavioral:  Negative for decreased  "concentration and dysphoric mood. The patient is not nervous/anxious.                Objective[]Expand by Default     /70 (BP Location: Left arm, Patient Position: Sitting, Cuff Size: Adult)   Ht 5' 2\" (1.575 m)   Wt 67.8 kg (149 lb 6.4 oz)   BMI 27.33 kg/m²      Physical Exam  Vitals reviewed. Exam conducted with a chaperone present.   Constitutional:       Appearance: Normal appearance.   HENT:      Mouth/Throat:      Mouth: Mucous membranes are moist.      Pharynx: Oropharynx is clear.   Eyes:      Conjunctiva/sclera: Conjunctivae normal.      Pupils: Pupils are equal, round, and reactive to light.   Cardiovascular:      Rate and Rhythm: Normal rate and regular rhythm.      Pulses: Normal pulses.      Heart sounds: Normal heart sounds.   Pulmonary:      Effort: Pulmonary effort is normal.      Breath sounds: Normal breath sounds.   Abdominal:      General: Bowel sounds are normal.      Palpations: Abdomen is soft.   Genitourinary:     General: Normal vulva.      Exam position: Lithotomy position.      Eloy stage (genital): 5.      Vagina: Normal.      Cervix: Normal.      Uterus: Normal.       Adnexa: Right adnexa normal and left adnexa normal.      Rectum: Normal.   Musculoskeletal:         General: Normal range of motion.      Cervical back: Neck supple.   Skin:     General: Skin is warm and dry.   Neurological:      General: No focal deficit present.      Mental Status: She is alert and oriented to person, place, and time.   Psychiatric:         Mood and Affect: Mood normal.   "

## 2024-12-16 ENCOUNTER — ANESTHESIA (OUTPATIENT)
Dept: PERIOP | Facility: HOSPITAL | Age: 54
End: 2024-12-16
Payer: COMMERCIAL

## 2024-12-16 ENCOUNTER — HOSPITAL ENCOUNTER (OUTPATIENT)
Facility: HOSPITAL | Age: 54
Setting detail: OUTPATIENT SURGERY
Discharge: HOME | End: 2024-12-16
Attending: OBSTETRICS & GYNECOLOGY | Admitting: OBSTETRICS & GYNECOLOGY
Payer: COMMERCIAL

## 2024-12-16 VITALS
DIASTOLIC BLOOD PRESSURE: 57 MMHG | BODY MASS INDEX: 26.81 KG/M2 | HEIGHT: 62 IN | OXYGEN SATURATION: 100 % | HEART RATE: 70 BPM | TEMPERATURE: 98 F | WEIGHT: 145.7 LBS | SYSTOLIC BLOOD PRESSURE: 119 MMHG | RESPIRATION RATE: 20 BRPM

## 2024-12-16 DIAGNOSIS — N95.0 PMB (POSTMENOPAUSAL BLEEDING): ICD-10-CM

## 2024-12-16 PROBLEM — Z98.890 S/P DILATION AND CURETTAGE: Status: ACTIVE | Noted: 2024-12-16

## 2024-12-16 LAB
EXT PREGNANCY TEST URINE: NEGATIVE
EXT. CONTROL: NORMAL
GLUCOSE SERPL-MCNC: 104 MG/DL (ref 65–140)
GLUCOSE SERPL-MCNC: 115 MG/DL (ref 65–140)

## 2024-12-16 PROCEDURE — 88342 IMHCHEM/IMCYTCHM 1ST ANTB: CPT | Performed by: SPECIALIST

## 2024-12-16 PROCEDURE — 88341 IMHCHEM/IMCYTCHM EA ADD ANTB: CPT | Performed by: SPECIALIST

## 2024-12-16 PROCEDURE — 81025 URINE PREGNANCY TEST: CPT | Performed by: OBSTETRICS & GYNECOLOGY

## 2024-12-16 PROCEDURE — 88305 TISSUE EXAM BY PATHOLOGIST: CPT | Performed by: SPECIALIST

## 2024-12-16 PROCEDURE — 58558 HYSTEROSCOPY BIOPSY: CPT | Performed by: OBSTETRICS & GYNECOLOGY

## 2024-12-16 PROCEDURE — 82948 REAGENT STRIP/BLOOD GLUCOSE: CPT

## 2024-12-16 RX ORDER — ACETAMINOPHEN 325 MG/1
975 TABLET ORAL EVERY 6 HOURS PRN
Status: CANCELLED | OUTPATIENT
Start: 2024-12-16

## 2024-12-16 RX ORDER — KETOROLAC TROMETHAMINE 30 MG/ML
INJECTION, SOLUTION INTRAMUSCULAR; INTRAVENOUS AS NEEDED
Status: DISCONTINUED | OUTPATIENT
Start: 2024-12-16 | End: 2024-12-16

## 2024-12-16 RX ORDER — MIDAZOLAM HYDROCHLORIDE 2 MG/2ML
INJECTION, SOLUTION INTRAMUSCULAR; INTRAVENOUS AS NEEDED
Status: DISCONTINUED | OUTPATIENT
Start: 2024-12-16 | End: 2024-12-16

## 2024-12-16 RX ORDER — SODIUM CHLORIDE, SODIUM LACTATE, POTASSIUM CHLORIDE, CALCIUM CHLORIDE 600; 310; 30; 20 MG/100ML; MG/100ML; MG/100ML; MG/100ML
INJECTION, SOLUTION INTRAVENOUS CONTINUOUS PRN
Status: DISCONTINUED | OUTPATIENT
Start: 2024-12-16 | End: 2024-12-16

## 2024-12-16 RX ORDER — IBUPROFEN 600 MG/1
600 TABLET, FILM COATED ORAL EVERY 6 HOURS PRN
Status: CANCELLED | OUTPATIENT
Start: 2024-12-16

## 2024-12-16 RX ORDER — ONDANSETRON 2 MG/ML
4 INJECTION INTRAMUSCULAR; INTRAVENOUS ONCE AS NEEDED
Status: DISCONTINUED | OUTPATIENT
Start: 2024-12-16 | End: 2024-12-16 | Stop reason: HOSPADM

## 2024-12-16 RX ORDER — FENTANYL CITRATE 50 UG/ML
INJECTION, SOLUTION INTRAMUSCULAR; INTRAVENOUS AS NEEDED
Status: DISCONTINUED | OUTPATIENT
Start: 2024-12-16 | End: 2024-12-16

## 2024-12-16 RX ORDER — MAGNESIUM HYDROXIDE 1200 MG/15ML
LIQUID ORAL AS NEEDED
Status: DISCONTINUED | OUTPATIENT
Start: 2024-12-16 | End: 2024-12-16 | Stop reason: HOSPADM

## 2024-12-16 RX ORDER — HYDROMORPHONE HCL IN WATER/PF 6 MG/30 ML
0.2 PATIENT CONTROLLED ANALGESIA SYRINGE INTRAVENOUS
Status: DISCONTINUED | OUTPATIENT
Start: 2024-12-16 | End: 2024-12-16 | Stop reason: HOSPADM

## 2024-12-16 RX ORDER — ONDANSETRON 2 MG/ML
INJECTION INTRAMUSCULAR; INTRAVENOUS AS NEEDED
Status: DISCONTINUED | OUTPATIENT
Start: 2024-12-16 | End: 2024-12-16

## 2024-12-16 RX ORDER — SODIUM CHLORIDE, SODIUM LACTATE, POTASSIUM CHLORIDE, CALCIUM CHLORIDE 600; 310; 30; 20 MG/100ML; MG/100ML; MG/100ML; MG/100ML
50 INJECTION, SOLUTION INTRAVENOUS CONTINUOUS
Status: CANCELLED | OUTPATIENT
Start: 2024-12-16

## 2024-12-16 RX ORDER — FENTANYL CITRATE/PF 50 MCG/ML
50 SYRINGE (ML) INJECTION
Status: DISCONTINUED | OUTPATIENT
Start: 2024-12-16 | End: 2024-12-16 | Stop reason: HOSPADM

## 2024-12-16 RX ADMIN — FENTANYL CITRATE 50 MCG: 50 INJECTION, SOLUTION INTRAMUSCULAR; INTRAVENOUS at 11:38

## 2024-12-16 RX ADMIN — FENTANYL CITRATE 50 MCG: 50 INJECTION, SOLUTION INTRAMUSCULAR; INTRAVENOUS at 11:31

## 2024-12-16 RX ADMIN — ONDANSETRON 4 MG: 2 INJECTION INTRAMUSCULAR; INTRAVENOUS at 11:18

## 2024-12-16 RX ADMIN — MIDAZOLAM HYDROCHLORIDE 2 MG: 1 INJECTION, SOLUTION INTRAMUSCULAR; INTRAVENOUS at 11:18

## 2024-12-16 RX ADMIN — SODIUM CHLORIDE, SODIUM LACTATE, POTASSIUM CHLORIDE, AND CALCIUM CHLORIDE: .6; .31; .03; .02 INJECTION, SOLUTION INTRAVENOUS at 09:44

## 2024-12-16 RX ADMIN — KETOROLAC TROMETHAMINE 15 MG: 30 INJECTION, SOLUTION INTRAMUSCULAR at 11:43

## 2024-12-16 NOTE — OP NOTE
OPERATIVE REPORT  PATIENT NAME: Yolanda Hubbard    :  1970  MRN: [id number]  Pt Location:  OR ROOM 01    SURGERY DATE: 2024    Surgeons and Role:     * Zuri Lyon MD - Primary     * Shruthi Priest MD- Assisting     Preop Diagnosis:  PMB (postmenopausal bleeding) [N95.0]    Post-Op Diagnosis Codes:     * PMB (postmenopausal bleeding) [N95.0]    Procedure(s):  EXAM UNDER ANESTHESIA; DILATATION AND CURETTAGE (D&C) WITH HYSTEROSCOPY    Specimen(s):  ID Type Source Tests Collected by Time Destination   1 : ENDOMETRIAL CURRETTINGS Tissue Endometrium TISSUE EXAM Zuri Lyon MD 2024 1138        Estimated Blood Loss:   Minimal    Drains:  None    Anesthesia Type:   IV Sedation with Anesthesia    Operative Indications:  PMB (postmenopausal bleeding) [N95.0]      Operative Findings:  1.  External genitalia grossly normal in appearance.  No ulcerations, no lacerations, no lesions.    2.  Vagina and cervix were  grossly normal in appearance without any lacerations or lesions.   3. Uterus sounded to 8 cm.  4. Hysteroscopic examination revealed proliferative endometrial lining.  Bilateral ostia were not visualized secondary to amount of tissue present. .        Complications:   None apparent     Procedure and Technique:  The patient was taken to the operating room where a time out was performed to confirm correct patient and correct procedure. General LMA anesthesia (LMA) was administered and the patient was positioned on the OR table in the dorsal lithotomy position. All pressure points were padded and a fransico hugger was placed to maintain control of core body temperature. The patient was prepped and draped in the usual sterile fashion.    A weighted speculum was inserted into the vagina and a Juan A retractor was used to visualize the anterior lip of the cervix, which was then grasped with a single toothed tenaculum. The uterus was sounded to 8cm. The cervix was serially dilated to 23 [place]  using Zuleta dilators for introduction of the hysteroscope.     Hysteroscope was introduced under direct visualization using normal saline solution as the distention media. Hysteroscope was advanced to the uterine fundus and the entire uterine cavity was inspected in a systematic manner with findings as described above Hysteroscope was withdrawn and sharp curetting was performed, starting at the 12'oclock position and rotating a total of 360 degrees to cover all surfaces. Endometrial tissue was obtained and sent for pathology. The hysteroscope was then re-introduced under direct visualization, again using normal saline solution as the distention media. Entire uterine cavity was inspected in a systematic manner. Adequate curetting was confirmed and hysteroscopy was withdrawn.     The single toothed tenaculum was removed from the anterior lip of the cervix. Good hemostasis was confirmed at the tenaculum puncture sites. Weighted speculum was then removed from the vagina. At the conclusion of the procedure, all needle, sponge, and instrument counts were noted to be correct x2. A fluid deficit of 340cc was noted.     Dr. Lyon was present and participated in all key portions of the case.        Patient Disposition:  PACU              SIGNATURE: Shruthi Priest MD  DATE: December 16, 2024  TIME: 11:48 AM

## 2024-12-16 NOTE — ANESTHESIA POSTPROCEDURE EVALUATION
Post-Op Assessment Note    CV Status:  Stable  Pain Score: 0    Pain management: adequate       Mental Status:  Sleepy and arousable   PONV Controlled:  None   Airway Patency:  Patent     Post Op Vitals Reviewed: Yes    No anethesia notable event occurred.    Staff: CRNA           Last Filed PACU Vitals:  Vitals Value Taken Time   Temp 97.6    Pulse 58    BP 86/48    Resp 14    SpO2 99        Modified Radha:  No data recorded

## 2024-12-16 NOTE — INTERVAL H&P NOTE
H&P reviewed. After examining the patient I find no changes in the patients condition since the H&P had been written.    Vitals:    12/16/24 0943   BP: 109/59   Pulse: 70   Resp: 18   Temp: 97.5 °F (36.4 °C)   SpO2: 99%

## 2024-12-16 NOTE — ANESTHESIA PREPROCEDURE EVALUATION
Procedure:  EXAM UNDER ANESTHESIA; DILATATION AND CURETTAGE (D&C) WITH HYSTEROSCOPY (Uterus)    Relevant Problems   ANESTHESIA (within normal limits)      CARDIO   (+) Hyperlipidemia      ENDO (within normal limits)      GI/HEPATIC (within normal limits)      /RENAL (within normal limits)      GYN (within normal limits)      HEMATOLOGY (within normal limits)      MUSCULOSKELETAL (within normal limits)      NEURO/PSYCH   (+) Diabetic polyneuropathy associated with type 2 diabetes mellitus (HCC)   (+) QUANG (generalized anxiety disorder)      PULMONARY   (+) Shortness of breath        Physical Exam    Airway    Mallampati score: II         Dental       Cardiovascular  Cardiovascular exam normal    Pulmonary  Pulmonary exam normal     Other Findings  post-pubertal.      Anesthesia Plan  ASA Score- 2     Anesthesia Type- IV sedation with anesthesia with ASA Monitors.         Additional Monitors:     Airway Plan:            Plan Factors-Exercise tolerance (METS): >4 METS.    Chart reviewed.    Patient summary reviewed.    Patient is not a current smoker. Patient not instructed to abstain from smoking on day of procedure. Patient did not smoke on day of surgery.            Induction-     Postoperative Plan-         Informed Consent- Anesthetic plan and risks discussed with patient.  I personally reviewed this patient with the CRNA. Discussed and agreed on the Anesthesia Plan with the CRNA..

## 2024-12-16 NOTE — ANESTHESIA POSTPROCEDURE EVALUATION
Post-Op Assessment Note    Last Filed PACU Vitals:  Vitals Value Taken Time   Temp 97.9 °F (36.6 °C) 12/16/24 1222   Pulse 68 12/16/24 1227   /55 12/16/24 1222   Resp 17 12/16/24 1227   SpO2 98 % 12/16/24 1227   Vitals shown include unfiled device data.    Modified Radha:  Activity: 2 (12/16/2024 12:22 PM)  Respiration: 2 (12/16/2024 12:22 PM)  Circulation: 2 (12/16/2024 12:22 PM)  Consciousness: 2 (12/16/2024 12:22 PM)  Oxygen Saturation: 2 (12/16/2024 12:22 PM)  Modified Radha Score: 10 (12/16/2024 12:22 PM)

## 2024-12-20 PROCEDURE — 88305 TISSUE EXAM BY PATHOLOGIST: CPT | Performed by: SPECIALIST

## 2024-12-20 PROCEDURE — 88341 IMHCHEM/IMCYTCHM EA ADD ANTB: CPT | Performed by: SPECIALIST

## 2024-12-20 PROCEDURE — 88342 IMHCHEM/IMCYTCHM 1ST ANTB: CPT | Performed by: SPECIALIST

## 2024-12-23 ENCOUNTER — TELEPHONE (OUTPATIENT)
Age: 54
End: 2024-12-23

## 2024-12-27 NOTE — TELEPHONE ENCOUNTER
Patient calling in again for test results.  She was advised provider is OOO until 1/6.  Pt verbalized understanding, no further questions or concerns at this time.

## 2025-01-07 ENCOUNTER — DOCUMENTATION (OUTPATIENT)
Dept: HEMATOLOGY ONCOLOGY | Facility: CLINIC | Age: 55
End: 2025-01-07

## 2025-01-07 ENCOUNTER — OFFICE VISIT (OUTPATIENT)
Dept: OBGYN CLINIC | Facility: CLINIC | Age: 55
End: 2025-01-07

## 2025-01-07 VITALS
HEIGHT: 62 IN | SYSTOLIC BLOOD PRESSURE: 102 MMHG | WEIGHT: 145.2 LBS | BODY MASS INDEX: 26.72 KG/M2 | DIASTOLIC BLOOD PRESSURE: 66 MMHG

## 2025-01-07 DIAGNOSIS — E11.42 DIABETIC POLYNEUROPATHY ASSOCIATED WITH TYPE 2 DIABETES MELLITUS (HCC): ICD-10-CM

## 2025-01-07 DIAGNOSIS — C54.1 ENDOMETRIAL CANCER (HCC): Primary | ICD-10-CM

## 2025-01-07 PROCEDURE — 99024 POSTOP FOLLOW-UP VISIT: CPT | Performed by: OBSTETRICS & GYNECOLOGY

## 2025-01-07 NOTE — PROGRESS NOTES
Chart rvw'd on 1/7/2025      Referred by:  Dr. Zuri Lyon    Diagnosis:  Endometrial cancer    Imaging:  10/29/2024 US pelvis complete w transvaginal-  Abnormally thickened and heterogeneous endometrium suggestive of endometrial hyperplasia though malignancy is not excluded. Recommend direct visualization and tissue sampling.       Pathology:  N/A    Scheduled appointments:  N/A    Surgery:  12/16/2024 EUA, D&C with hysteroscopy      Post surgical pathology:  MMRINTACT     RESULTS OF IMMUNOHISTOCHEMICAL ANALYSIS FOR MISMATCH REPAIR PROTEIN LOSS  INTERPRETATION (block A3): No loss of nuclear expression of MMR proteins: low probability of MSI-H        RESULTS:  Antibody            Clone                 Description                     Results  MLH1                 M1                     Mismatch repair protein  Intact nuclear expression  MSH2                [id number]        Mismatch repair protein  Intact nuclear expression  MSH6                SP93                  Mismatch repair protein  Intact nuclear expression  PMS2                 A16                    Mismatch repair protein  Intact nuclear expression      A. Endometrium, endometrial curettings:    - Endometrioid adenocarcinoma. FIGO grade 1. See comment.      Comment: Immunohistochemical stains performed with adequate controls on block A3 demonstrates that the tumor is positive for PAX8, ER, MA, Vimentin, CAM5.2, p16 (patchy) and negative for CD10, P504S, and Napsin A. P53 shows wild type staining. The immunophenotype is compatible with the diagnosis.

## 2025-01-07 NOTE — PROGRESS NOTES
"Assessment      Doing well postoperatively.  Operative findings again reviewed. Pathology report discussed.      Plan     1. Continue any current medications.  2. Wound care discussed.  3. Activity restrictions: none  4. Anticipated return to work: now.  5. Follow up:  in near future      for gyn onc referral.       Subjective     Yolanda Hubbard is a 54 y.o. female who presents to the clinic 2 weeks status post operative hysteroscopy for abnormal uterine bleeding. Eating a regular diet without difficulty. Bowel movements are normal. The patient is not having any pain.    The following portions of the patient's history were reviewed and updated as appropriate: allergies, current medications, past family history, past medical history, past social history, past surgical history, and problem list.    Review of Systems  Pertinent items are noted in HPI.      Objective     /66 (BP Location: Left arm, Patient Position: Sitting, Cuff Size: Adult)   Ht 5' 2\" (1.575 m)   Wt 65.9 kg (145 lb 3.2 oz)   LMP  (LMP Unknown) Comment: Abnormal bleeding- currently bleeding  BMI 26.56 kg/m²   General:  alert and oriented, in no acute distress   Abdomen: soft, bowel sounds active, non-tender   Incision:   healing well, no drainage, no erythema, no hernia, no seroma, no swelling, no dehiscence, incision well approximated           "

## 2025-01-14 ENCOUNTER — DOCUMENTATION (OUTPATIENT)
Dept: OTHER | Facility: HOSPITAL | Age: 55
End: 2025-01-14

## 2025-01-14 ENCOUNTER — OFFICE VISIT (OUTPATIENT)
Dept: GYNECOLOGIC ONCOLOGY | Facility: CLINIC | Age: 55
End: 2025-01-14
Payer: COMMERCIAL

## 2025-01-14 VITALS
OXYGEN SATURATION: 97 % | HEART RATE: 87 BPM | TEMPERATURE: 97.3 F | DIASTOLIC BLOOD PRESSURE: 74 MMHG | BODY MASS INDEX: 26.04 KG/M2 | SYSTOLIC BLOOD PRESSURE: 126 MMHG | WEIGHT: 141.5 LBS | HEIGHT: 62 IN

## 2025-01-14 DIAGNOSIS — C54.1 ENDOMETRIAL CANCER (HCC): Primary | ICD-10-CM

## 2025-01-14 DIAGNOSIS — C54.1 ENDOMETRIAL CANCER (HCC): ICD-10-CM

## 2025-01-14 DIAGNOSIS — E11.42 DIABETIC POLYNEUROPATHY ASSOCIATED WITH TYPE 2 DIABETES MELLITUS (HCC): ICD-10-CM

## 2025-01-14 PROCEDURE — 99459 PELVIC EXAMINATION: CPT | Performed by: OBSTETRICS & GYNECOLOGY

## 2025-01-14 PROCEDURE — 99205 OFFICE O/P NEW HI 60 MIN: CPT | Performed by: OBSTETRICS & GYNECOLOGY

## 2025-01-14 RX ORDER — TIRZEPATIDE 7.5 MG/.5ML
INJECTION, SOLUTION SUBCUTANEOUS
COMMUNITY
Start: 2024-12-27

## 2025-01-14 RX ORDER — METRONIDAZOLE 500 MG/100ML
500 INJECTION, SOLUTION INTRAVENOUS ONCE
OUTPATIENT
Start: 2025-01-14 | End: 2025-01-14

## 2025-01-14 RX ORDER — SODIUM CHLORIDE, SODIUM LACTATE, POTASSIUM CHLORIDE, CALCIUM CHLORIDE 600; 310; 30; 20 MG/100ML; MG/100ML; MG/100ML; MG/100ML
125 INJECTION, SOLUTION INTRAVENOUS CONTINUOUS
OUTPATIENT
Start: 2025-01-14

## 2025-01-14 RX ORDER — HEPARIN SODIUM 5000 [USP'U]/ML
5000 INJECTION, SOLUTION INTRAVENOUS; SUBCUTANEOUS
OUTPATIENT
Start: 2025-01-14 | End: 2025-01-15

## 2025-01-14 RX ORDER — CEFAZOLIN SODIUM 2 G/50ML
2000 SOLUTION INTRAVENOUS ONCE
OUTPATIENT
Start: 2025-01-14 | End: 2025-01-14

## 2025-01-14 RX ORDER — MEGESTROL ACETATE 40 MG/1
40 TABLET ORAL 2 TIMES DAILY
Qty: 60 TABLET | Refills: 3 | Status: SHIPPED | OUTPATIENT
Start: 2025-01-14 | End: 2025-01-15

## 2025-01-14 RX ORDER — ACETAMINOPHEN 325 MG/1
975 TABLET ORAL ONCE
OUTPATIENT
Start: 2025-01-14 | End: 2025-01-14

## 2025-01-14 NOTE — PATIENT INSTRUCTIONS
1. Nothing to eat or drink after midnight prior to the operation.    2. Please avoid ibuprofen, aspirin, fish oil for 7 days prior to surgery  3. Medications to take the morning of surgery with a sip of water: gabapentin  4.  If taking Mounjaro, please stop 7 days prior to surgery.  You may restart immediately after the operation.

## 2025-01-14 NOTE — PROGRESS NOTES
Name: Yolanda Hubbard      : 1970      MRN: [id number]  Encounter Provider: Cl Fernandez MD  Encounter Date: 2025   [facility]  :  Assessment & Plan  Endometrial cancer (HCC)  54-year-old with biopsy-proven grade 1 endometrial cancer, pMMR, p53 wild-type.  Her performance status is 0.  1.  We discussed the pathophysiology of endometrioid type endometrial cancers.  2.  I discussed treatment options for her endometrial cancer including hormonal therapy, primary radiation therapy, surgical management.  She understands that standard of care is surgical management followed by adjuvant therapy if needed.  3.  I discussed the risks and benefits of examination under anesthesia, robotic assisted total laparoscopic hysterectomy, bilateral salpingo-oophorectomy, lymph node dissection, possible exploratory laparotomy and all other indicated procedures.  She understands the risks and benefits of the operation including the risks of wound infections, wound separations, pelvic and abdominal infections, blood clots, anesthesia complications, reoperation, damage to other organs, death and agrees to proceed as outlined.  Consent for surgery was obtained by me in the office.  4.  She understands that adjuvant therapy is prescribed based on surgical stage and that treatment options include vaginal brachytherapy, pelvic radiation therapy, adjuvant chemotherapy plus or minus immunotherapy, observation.  5.  We reviewed perioperative medication management  6.  She would like to postpone the surgery due to financial reasons.  Therefore suggest starting palliative Megace at 40 mg twice daily.  She understands the risks of Megace including the risks of weight gain, thromboembolism, glucose intolerance, worsening vaginal bleeding and she agrees to proceed as outlined.  7.  CT abdomen pelvis to evaluate for metastatic disease given length of abnormal  bleeding    Thank you for the courtesy of this consultation.  All questions were answered by the end of the visit.    Orders:    Case request operating room: HYSTERECTOMY LAPAROSCOPIC TOTAL (LTH) W/ ROBOTICS; Standing    Type and screen; Future    CBC and Platelet; Future    Basic metabolic panel; Future    HEMOGLOBIN A1C W/ EAG ESTIMATION; Future    EKG 12 lead; Future    XR chest pa and lateral; Future    Ambulatory Referral to Gynecologic Oncology    megestrol (MEGACE) 40 mg tablet; Take 1 tablet (40 mg total) by mouth 2 (two) times a day 2 tablets by mouth twice daily    CT abdomen pelvis w contrast; Future    Diabetic polyneuropathy associated with type 2 diabetes mellitus (HCC)    Lab Results   Component Value Date    HGBA1C 6.4 (H) 09/11/2024     We discussed perioperative management of Mounjaro.  Repeat hemoglobin A1c prior to surgery.               History of Present Illness   Reason for Visit / CC: Biopsy-proven grade 1 endometrial cancer   Yolanda Hubbard is a 54 y.o. female   54-year-old with biopsy-proven grade 1 endometrial cancer presents as a consultation from Dr. Lyon to discuss treatment options.  She has been having abnormal uterine bleeding/postmenopausal bleeding for 4 years.  Pelvic ultrasound 1020 dyed 54 revealed the uterus to measure 7 x 6.2 cm with endometrial thickness of 1.9 cm.  The right ovary was normal.  The left ovary was not seen.  There is no free fluid.  She was noted to have EIN suspicious for carcinoma by endometrial biopsy 11/12/2024.  She then had dilation and curettage on 12/16/2024 which revealed a grade 1 endometrial cancer, pMMR, p53 wild-type.  She is a type II diabetic with a hemoglobin A1c most recently of 6.4%.  She is still having small amounts of postmenopausal bleeding.  No pelvic or abdominal pain.  She is able to perform her actives of daily living.      Pertinent Medical History   Type 2 diabetes with neuropathy      Review of Systems   Constitutional:  Negative  for activity change and unexpected weight change.   HENT: Negative.     Eyes: Negative.    Respiratory: Negative.     Cardiovascular: Negative.    Gastrointestinal:  Negative for abdominal distention and abdominal pain.   Endocrine: Negative.    Genitourinary:  Positive for vaginal bleeding. Negative for pelvic pain.   Musculoskeletal: Negative.    Skin: Negative.    Allergic/Immunologic: Negative.    Neurological: Negative.    Hematological: Negative.    Psychiatric/Behavioral: Negative.      A complete review of systems is negative other than that noted above in the HPI.  Past Medical History   Past Medical History:   Diagnosis Date    Diabetes mellitus (HCC)     Hyperlipidemia     Hypertension     Lyme disease     treated 6 years ago - 1+ month medication     Past Surgical History:   Procedure Laterality Date    CHOLECYSTECTOMY      COLONOSCOPY      DILATION AND CURETTAGE OF UTERUS      LAPAROSCOPY      MD HYSTEROSCOPY BX ENDOMETRIUM&/POLYPC W/WO D&C N/A 2024    Procedure: EXAM UNDER ANESTHESIA; DILATATION AND CURETTAGE (D&C) WITH HYSTEROSCOPY;  Surgeon: Zuri Lyon MD;  Location:  MAIN OR;  Service: Gynecology    WISDOM TOOTH EXTRACTION       Family History   Problem Relation Age of Onset    Deep vein thrombosis Mother     Hypertension Mother     COPD Father     Diabetes Father     Hypertension Sister     Heart failure Maternal Grandmother          age 69    Heart disease Maternal Grandfather     Heart disease Paternal Grandfather       reports that she quit smoking about 15 years ago. Her smoking use included cigarettes. She started smoking about 33 years ago. She has a 18 pack-year smoking history. She has never used smokeless tobacco. She reports that she does not drink alcohol and does not use drugs.  Current Outpatient Medications on File Prior to Visit   Medication Sig Dispense Refill    fenofibrate (TRICOR) 145 mg tablet Take 145 mg by mouth daily at bedtime      gabapentin (NEURONTIN)  "100 mg capsule Take 1 capsule (100 mg total) by mouth 3 (three) times a day 90 capsule 0    lisinopril (ZESTRIL) 10 mg tablet Take 10 mg by mouth daily at bedtime      Mounjaro 7.5 MG/0.5ML SOAJ INJECT 7.5 MG UNDER THE SKIN ONE TIME PER WEEK      Mounjaro 7.5 MG/0.5ML Inject 7.5 mg under the skin      pravastatin (PRAVACHOL) 40 mg tablet Take 1 tablet (40 mg total) by mouth daily with dinner 30 tablet 0    azithromycin (ZITHROMAX) 250 mg tablet Take 2 tablets by mouth on day one; then one tablet daily for 4 days. (Patient not taking: Reported on 1/14/2025)      fenofibrate (TRICOR) 54 MG tablet Take 1 tablet (54 mg total) by mouth daily (Patient not taking: Reported on 1/14/2025) 90 tablet 1    [DISCONTINUED] aspirin 81 mg chewable tablet Chew 1 tablet (81 mg total) daily (Patient not taking: Reported on 10/8/2024) 30 tablet 0    [DISCONTINUED] benzonatate (TESSALON PERLES) 100 mg capsule Take 1 capsule (100 mg total) by mouth 3 (three) times a day (Patient not taking: Reported on 10/8/2024) 20 capsule 0    [DISCONTINUED] dextromethorphan-guaiFENesin (ROBITUSSIN DM)  mg/5 mL syrup Take 10 mL by mouth every 4 (four) hours (Patient not taking: Reported on 10/8/2024) 118 mL 0    [DISCONTINUED] meclizine (ANTIVERT) 25 mg tablet Take 25 mg by mouth Three times daily as needed (Patient not taking: Reported on 10/8/2024)      [DISCONTINUED] Mounjaro 5 MG/0.5ML INJECT 5 MG SUBCUTANEOUSLY WEEKLY (Patient not taking: Reported on 10/8/2024)      [DISCONTINUED] SYNJARDY XR  MG TB24 Take 15 mg by mouth daily in the early morning (Patient not taking: Reported on 10/8/2024)       No current facility-administered medications on file prior to visit.     Allergies   Allergen Reactions    Semaglutide Rash         Objective   /74   Pulse 87   Temp (!) 97.3 °F (36.3 °C)   Ht 5' 2\" (1.575 m)   Wt 64.2 kg (141 lb 8 oz)   LMP  (LMP Unknown) Comment: Abnormal bleeding- currently bleeding  SpO2 97%   BMI 25.88 kg/m² "     Body mass index is 25.88 kg/m².  Pain Screening:  Pain Score: 0-No pain  ECOG   0  Physical Exam  Vitals reviewed. Exam conducted with a chaperone present.   Constitutional:       General: She is not in acute distress.     Appearance: Normal appearance. She is well-developed and normal weight. She is not ill-appearing, toxic-appearing or diaphoretic.   HENT:      Head: Normocephalic and atraumatic.   Eyes:      General: No scleral icterus.     Extraocular Movements: Extraocular movements intact.      Conjunctiva/sclera: Conjunctivae normal.   Neck:      Thyroid: No thyromegaly.   Cardiovascular:      Rate and Rhythm: Normal rate and regular rhythm.      Heart sounds: Normal heart sounds.   Pulmonary:      Effort: Pulmonary effort is normal.      Breath sounds: Normal breath sounds.   Abdominal:      General: There is no distension.      Palpations: Abdomen is soft. There is no mass.      Tenderness: There is no abdominal tenderness. There is no guarding or rebound.      Hernia: No hernia is present.   Genitourinary:     Comments: The external female genitalia is normal. The bartholin's, uretheral and skenes glands are normal. The urethral meatus is normal (midline with no lesions). Anus without fissure or lesion. Speculum exam reveals an atrophic vagina without lesion or discharge. Cervix is normal appearing without visible lesions. No bleeding. No significant cystocele or rectocele noted. Bimanual exam notes a uterus with normal contour, mobility. No tenderness. Adnexa without masses or tenderness. Bladder is without fullness, mass or tenderness.    Musculoskeletal:         General: No swelling or tenderness.      Cervical back: Normal range of motion and neck supple.      Right lower leg: No edema.      Left lower leg: No edema.   Lymphadenopathy:      Cervical: No cervical adenopathy.   Skin:     General: Skin is warm and dry.      Coloration: Skin is not jaundiced or pale.      Findings: No lesion or rash.  "  Neurological:      General: No focal deficit present.      Mental Status: She is alert and oriented to person, place, and time. Mental status is at baseline.      Cranial Nerves: No cranial nerve deficit.      Motor: No weakness.      Gait: Gait normal.   Psychiatric:         Mood and Affect: Mood normal.         Behavior: Behavior normal.         Thought Content: Thought content normal.         Judgment: Judgment normal.          Labs: I have reviewed pertinent labs.   No results found for: \"\"  Lab Results   Component Value Date/Time    Potassium 4.2 09/11/2024 03:25 PM    Chloride 106 09/11/2024 03:25 PM    Carbon Dioxide 25 09/11/2024 03:25 PM    BUN 16 09/11/2024 03:25 PM    Creatinine 0.76 09/11/2024 03:25 PM    Calcium 10.1 09/11/2024 03:25 PM    AST 17 09/11/2024 03:25 PM    ALT 21 09/11/2024 03:25 PM    Alkaline Phosphatase 51 09/11/2024 03:25 PM    eGFRcr 92 09/11/2024 03:25 PM       No results found for: \"\"    Radiology Results Review : I have reviewed images/report studies in PACS as described above (in the HPI).  Other Study Results Review : Pathology reports reviewed.      "

## 2025-01-14 NOTE — PROGRESS NOTES
[facility]  Documentation of Informed Consent      I, Vita Aldridge, Clinical Research Coordinator, met with Yolanda Hubbard on Date: 1/14/ at Time: 12:09pm to conduct the informed consent process for enrollment into Research Study: [employer] 2021-05 . Vita Aldridge, Yolanda Hubbard, and Yolanda's daughter and sister were present during the consent discussion and the signing of the consent form.   The current IRB approved informed consent form was reviewed in its entirety. Yolanda Hubbard was given sufficient time to review the study information and to ask questions, and all questions were answered to the satisfaction of Yolandaantonina Hubbard. A copy of the signed informed consent form was provided to Yolanda Hubbard. The informed consent document was signed before any research procedures were performed.   W-9 was completed by patient, and copy was made and given to patient.  Blood draw for study was completed by Concepcion LOPEZ on 1/14/2025 at 12:14pm.

## 2025-01-14 NOTE — ASSESSMENT & PLAN NOTE
Lab Results   Component Value Date    HGBA1C 6.4 (H) 09/11/2024     We discussed perioperative management of Mounjaro.  Repeat hemoglobin A1c prior to surgery.

## 2025-01-14 NOTE — ASSESSMENT & PLAN NOTE
54-year-old with biopsy-proven grade 1 endometrial cancer, pMMR, p53 wild-type.  Her performance status is 0.  1.  We discussed the pathophysiology of endometrioid type endometrial cancers.  2.  I discussed treatment options for her endometrial cancer including hormonal therapy, primary radiation therapy, surgical management.  She understands that standard of care is surgical management followed by adjuvant therapy if needed.  3.  I discussed the risks and benefits of examination under anesthesia, robotic assisted total laparoscopic hysterectomy, bilateral salpingo-oophorectomy, lymph node dissection, possible exploratory laparotomy and all other indicated procedures.  She understands the risks and benefits of the operation including the risks of wound infections, wound separations, pelvic and abdominal infections, blood clots, anesthesia complications, reoperation, damage to other organs, death and agrees to proceed as outlined.  Consent for surgery was obtained by me in the office.  4.  She understands that adjuvant therapy is prescribed based on surgical stage and that treatment options include vaginal brachytherapy, pelvic radiation therapy, adjuvant chemotherapy plus or minus immunotherapy, observation.  5.  We reviewed perioperative medication management  6.  She would like to postpone the surgery due to financial reasons.  Therefore suggest starting palliative Megace at 40 mg twice daily.  She understands the risks of Megace including the risks of weight gain, thromboembolism, glucose intolerance, worsening vaginal bleeding and she agrees to proceed as outlined.  7.  CT abdomen pelvis to evaluate for metastatic disease given length of abnormal bleeding    Thank you for the courtesy of this consultation.  All questions were answered by the end of the visit.    Orders:    Case request operating room: HYSTERECTOMY LAPAROSCOPIC TOTAL (LTH) W/ ROBOTICS; Standing    Type and screen; Future    CBC and Platelet;  Future    Basic metabolic panel; Future    HEMOGLOBIN A1C W/ EAG ESTIMATION; Future    EKG 12 lead; Future    XR chest pa and lateral; Future    Ambulatory Referral to Gynecologic Oncology    megestrol (MEGACE) 40 mg tablet; Take 1 tablet (40 mg total) by mouth 2 (two) times a day 2 tablets by mouth twice daily    CT abdomen pelvis w contrast; Future

## 2025-01-15 ENCOUNTER — PATIENT OUTREACH (OUTPATIENT)
Dept: HEMATOLOGY ONCOLOGY | Facility: CLINIC | Age: 55
End: 2025-01-15

## 2025-01-15 RX ORDER — MEGESTROL ACETATE 40 MG/1
40 TABLET ORAL 2 TIMES DAILY
Qty: 60 TABLET | Refills: 3 | Status: SHIPPED | OUTPATIENT
Start: 2025-01-15

## 2025-01-15 NOTE — PROGRESS NOTES
I reached out to Yolanda  to complete the Distress Thermometer, review for any barriers to care and to offer any supportive services that may be needed. I left VM with the reason for my call including my direct phone number .    I reached out and spoke with Yolanda  now that consults have been completed with the oncology teams to review for any barriers to care and offer supportive services as needed. Distress Thermometer completed at this time. Patient scored 4/10. Based on responses to DT, no indication for referral to SW needed at this time. . I reviewed and updated the members assigned to the care team in [facility].   She knows the members of the care team as well as how and when to contact them with any needs.   She verbalizes managing the schedules well.   She is currently able to drive and denies any transportation needs.    She denies any uncontrolled symptoms. Discussed role of Palliative Care in symptom and side effect management. Declined referral at this time.  She states that she is eating and drinking as per usual with no unintentional weight loss.     Patient does not smoke.   She states she is well supported by family and friends.  Community support groups discussed including the Cancer Support Community of the [facility]. Patient declined information at this time.   She feels she has adequate insurance coverage and denies any financial concerns at this time.     Based on individual needs I will follow up in about 6 weeks. I have provided my direct contact information and welcome them to contact me if needs as discussed above change. She was appreciative for the call.    normal/regular rate and rhythm/S1 S2 present/no gallops/no rub/vascular S1 S2 present/no gallops/no rub/bradycardia/vascular

## 2025-01-22 ENCOUNTER — APPOINTMENT (OUTPATIENT)
Dept: LAB | Facility: CLINIC | Age: 55
End: 2025-01-22
Payer: COMMERCIAL

## 2025-01-22 DIAGNOSIS — C54.1 ENDOMETRIAL CANCER (HCC): ICD-10-CM

## 2025-01-22 LAB
ANION GAP SERPL CALCULATED.3IONS-SCNC: 9 MMOL/L (ref 4–13)
BUN SERPL-MCNC: 14 MG/DL (ref 5–25)
CALCIUM SERPL-MCNC: 9.8 MG/DL (ref 8.4–10.2)
CHLORIDE SERPL-SCNC: 107 MMOL/L (ref 96–108)
CO2 SERPL-SCNC: 24 MMOL/L (ref 21–32)
CREAT SERPL-MCNC: 0.79 MG/DL (ref 0.6–1.3)
GFR SERPL CREATININE-BSD FRML MDRD: 83 ML/MIN/1.73SQ M
GLUCOSE P FAST SERPL-MCNC: 135 MG/DL (ref 65–99)
POTASSIUM SERPL-SCNC: 4.4 MMOL/L (ref 3.5–5.3)
SODIUM SERPL-SCNC: 140 MMOL/L (ref 135–147)

## 2025-01-22 PROCEDURE — 36415 COLL VENOUS BLD VENIPUNCTURE: CPT

## 2025-01-22 PROCEDURE — 80048 BASIC METABOLIC PNL TOTAL CA: CPT

## 2025-01-28 ENCOUNTER — HOSPITAL ENCOUNTER (OUTPATIENT)
Dept: CT IMAGING | Facility: HOSPITAL | Age: 55
Discharge: HOME | End: 2025-01-28
Attending: OBSTETRICS & GYNECOLOGY
Payer: COMMERCIAL

## 2025-01-28 DIAGNOSIS — C54.1 ENDOMETRIAL CANCER (HCC): ICD-10-CM

## 2025-01-28 PROCEDURE — 74177 CT ABD & PELVIS W/CONTRAST: CPT

## 2025-01-28 RX ADMIN — IOHEXOL 100 ML: 350 INJECTION, SOLUTION INTRAVENOUS at 15:13

## 2025-02-06 ENCOUNTER — RESULTS FOLLOW-UP (OUTPATIENT)
Age: 55
End: 2025-02-06

## 2025-02-06 DIAGNOSIS — C54.1 ENDOMETRIAL CANCER (HCC): ICD-10-CM

## 2025-02-06 RX ORDER — MEGESTROL ACETATE 40 MG/1
40 TABLET ORAL 2 TIMES DAILY
Qty: 180 TABLET | Refills: 0 | Status: SHIPPED | OUTPATIENT
Start: 2025-02-06

## 2025-04-28 ENCOUNTER — TELEPHONE (OUTPATIENT)
Dept: GYNECOLOGIC ONCOLOGY | Facility: CLINIC | Age: 55
End: 2025-04-28

## 2025-04-28 ENCOUNTER — ANESTHESIA EVENT (OUTPATIENT)
Dept: PERIOP | Facility: HOSPITAL | Age: 55
End: 2025-04-28
Payer: COMMERCIAL

## 2025-04-28 NOTE — TELEPHONE ENCOUNTER
Called patient back and went over all pre-admission labs, xray and EKG that are needed. Patient will go tomorrow morning to complete these tests.

## 2025-04-28 NOTE — PRE-PROCEDURE INSTRUCTIONS
Pre-Surgery Instructions:   Medication Instructions    fenofibrate (TRICOR) 145 mg tablet Take night before surgery    gabapentin (NEURONTIN) 100 mg capsule Take day of surgery.    lisinopril (ZESTRIL) 10 mg tablet Take night before surgery    megestrol (MEGACE) 40 mg tablet Take day of surgery.    Mounjaro 7.5 MG/0.5ML Stop taking 7 days prior to surgery.-LD 4/13    pravastatin (PRAVACHOL) 40 mg tablet Take night before surgery    Medication instructions for day of surgery reviewed. Please take all instructed medications with only a sip of water.       You will receive a call one business day prior to surgery with an arrival time and hospital directions. If your surgery is scheduled on a Monday, the hospital will be calling you on the Friday prior to your surgery. If you have not heard from anyone by 8pm, please call the hospital supervisor through the hospital  at [phone]. ([place] [phone] or [place] [phone]).    Do not eat or drink anything after midnight the night before your surgery, including candy, mints, lifesavers, or chewing gum. Do not drink alcohol 24hrs before your surgery. Try not to smoke at least 24hrs before your surgery.       Follow the pre surgery showering instructions as listed in the “My Surgical Experience Booklet” or otherwise provided by your surgeon's office. Do not use a blade to shave the surgical area 1 week before surgery. It is okay to use a clean electric clippers up to 24 hours before surgery. Do not apply any lotions, creams, including makeup, cologne, deodorant, or perfumes after showering on the day of your surgery. Do not use dry shampoo, hair spray, hair gel, or any type of hair products.     No contact lenses, eye make-up, or artificial eyelashes. Remove nail polish, including gel polish, and any artificial, gel, or acrylic nails if possible. Remove all jewelry including rings and body piercing jewelry.     Wear causal clothing that is easy to take on  and off. Consider your type of surgery.    Keep any valuables, jewelry, piercings at home. Please bring any specially ordered equipment (sling, braces) if indicated.    Arrange for a responsible person to drive you to and from the hospital on the day of your surgery. Please confirm the visitor policy for the day of your procedure when you receive your phone call with an arrival time.     Call the surgeon's office with any new illnesses, exposures, or additional questions prior to surgery.    Please reference your “My Surgical Experience Booklet” for additional information to prepare for your upcoming surgery.

## 2025-04-28 NOTE — TELEPHONE ENCOUNTER
Patient has questions regarding orders for upcoming blood work and xray. She has surgery scheduled on 5/21 and wants to know when she should have labs and imaging done?

## 2025-04-28 NOTE — TELEPHONE ENCOUNTER
Received Paperwork   What type of form FMLA   Scanned blank form into patient's Epic chart Yes   Method received form In Person drop off   Provider responsible for form Dr Fernandez    Informed patient our office turn around time for completing patient forms is 5-7 business days. Yes, informed patient of turn around time     Comments Please do not fax to HR. Patients employer still needs to fill out their portion.

## 2025-04-28 NOTE — TELEPHONE ENCOUNTER
Attempted to contact patient - left a detailed voicemail for patient to sub it blank FMLA forms   Statement Selected

## 2025-04-29 ENCOUNTER — APPOINTMENT (OUTPATIENT)
Dept: LAB | Facility: CLINIC | Age: 55
End: 2025-04-29
Attending: OBSTETRICS & GYNECOLOGY
Payer: COMMERCIAL

## 2025-04-29 ENCOUNTER — HOSPITAL ENCOUNTER (OUTPATIENT)
Dept: RADIOLOGY | Facility: HOSPITAL | Age: 55
Discharge: HOME | End: 2025-04-29
Payer: COMMERCIAL

## 2025-04-29 ENCOUNTER — LAB REQUISITION (OUTPATIENT)
Dept: LAB | Facility: HOSPITAL | Age: 55
End: 2025-04-29
Payer: COMMERCIAL

## 2025-04-29 DIAGNOSIS — C54.1 ENDOMETRIAL SARCOMA (HCC): Primary | ICD-10-CM

## 2025-04-29 DIAGNOSIS — C54.1 ENDOMETRIAL CANCER (HCC): ICD-10-CM

## 2025-04-29 DIAGNOSIS — C54.1 MALIGNANT NEOPLASM OF ENDOMETRIUM (HCC): ICD-10-CM

## 2025-04-29 LAB
ABO GROUP BLD: NORMAL
ATRIAL RATE: 85 BPM
BLD GP AB SCN SERPL QL: NEGATIVE
ERYTHROCYTE [DISTWIDTH] IN BLOOD BY AUTOMATED COUNT: 13.1 % (ref 11.6–15.1)
EST. AVERAGE GLUCOSE BLD GHB EST-MCNC: 143 MG/DL
HBA1C MFR BLD: 6.6 %
HCT VFR BLD AUTO: 41.1 % (ref 34.8–46.1)
HGB BLD-MCNC: 14.3 G/DL (ref 11.5–15.4)
MCH RBC QN AUTO: 30.9 PG (ref 26.8–34.3)
MCHC RBC AUTO-ENTMCNC: 34.8 G/DL (ref 31.4–37.4)
MCV RBC AUTO: 89 FL (ref 82–98)
P AXIS: 35 DEGREES
PLATELET # BLD AUTO: 320 THOUSANDS/UL (ref 149–390)
PMV BLD AUTO: 11.4 FL (ref 8.9–12.7)
PR INTERVAL: 106 MS
QRS AXIS: 9 DEGREES
QRSD INTERVAL: 80 MS
QT INTERVAL: 356 MS
QTC INTERVAL: 423 MS
RBC # BLD AUTO: 4.63 MILLION/UL (ref 3.81–5.12)
RH BLD: POSITIVE
SPECIMEN EXPIRATION DATE: NORMAL
T WAVE AXIS: 50 DEGREES
VENTRICULAR RATE: 85 BPM
WBC # BLD AUTO: 9.33 THOUSAND/UL (ref 4.31–10.16)

## 2025-04-29 PROCEDURE — 71046 X-RAY EXAM CHEST 2 VIEWS: CPT

## 2025-04-29 PROCEDURE — 36415 COLL VENOUS BLD VENIPUNCTURE: CPT

## 2025-04-29 PROCEDURE — 86900 BLOOD TYPING SEROLOGIC ABO: CPT | Performed by: OBSTETRICS & GYNECOLOGY

## 2025-04-29 PROCEDURE — 86901 BLOOD TYPING SEROLOGIC RH(D): CPT | Performed by: OBSTETRICS & GYNECOLOGY

## 2025-04-29 PROCEDURE — 86850 RBC ANTIBODY SCREEN: CPT | Performed by: OBSTETRICS & GYNECOLOGY

## 2025-04-29 PROCEDURE — 93010 ELECTROCARDIOGRAM REPORT: CPT | Performed by: INTERNAL MEDICINE

## 2025-05-05 ENCOUNTER — ANESTHESIA (OUTPATIENT)
Dept: PERIOP | Facility: HOSPITAL | Age: 55
End: 2025-05-05
Payer: COMMERCIAL

## 2025-05-05 ENCOUNTER — HOSPITAL ENCOUNTER (OUTPATIENT)
Facility: HOSPITAL | Age: 55
Setting detail: OUTPATIENT SURGERY
Discharge: HOME | End: 2025-05-05
Attending: OBSTETRICS & GYNECOLOGY | Admitting: OBSTETRICS & GYNECOLOGY
Payer: COMMERCIAL

## 2025-05-05 VITALS
WEIGHT: 141.54 LBS | RESPIRATION RATE: 17 BRPM | BODY MASS INDEX: 26.05 KG/M2 | SYSTOLIC BLOOD PRESSURE: 94 MMHG | HEART RATE: 58 BPM | OXYGEN SATURATION: 98 % | DIASTOLIC BLOOD PRESSURE: 57 MMHG | TEMPERATURE: 96.7 F | HEIGHT: 62 IN

## 2025-05-05 DIAGNOSIS — C54.1 ENDOMETRIAL CANCER (HCC): ICD-10-CM

## 2025-05-05 LAB
ABO GROUP BLD: NORMAL
ABO GROUP BLD: NORMAL
BLD GP AB SCN SERPL QL: NEGATIVE
GLUCOSE SERPL-MCNC: 123 MG/DL (ref 65–140)
GLUCOSE SERPL-MCNC: 181 MG/DL (ref 65–140)
RH BLD: POSITIVE
RH BLD: POSITIVE
SPECIMEN EXPIRATION DATE: NORMAL

## 2025-05-05 PROCEDURE — 88309 TISSUE EXAM BY PATHOLOGIST: CPT | Performed by: PATHOLOGY

## 2025-05-05 PROCEDURE — 88342 IMHCHEM/IMCYTCHM 1ST ANTB: CPT | Performed by: PATHOLOGY

## 2025-05-05 PROCEDURE — 82948 REAGENT STRIP/BLOOD GLUCOSE: CPT

## 2025-05-05 PROCEDURE — 38900 IO MAP OF SENT LYMPH NODE: CPT | Performed by: OBSTETRICS & GYNECOLOGY

## 2025-05-05 PROCEDURE — 86850 RBC ANTIBODY SCREEN: CPT | Performed by: OBSTETRICS & GYNECOLOGY

## 2025-05-05 PROCEDURE — 58571 TLH W/T/O 250 G OR LESS: CPT | Performed by: OBSTETRICS & GYNECOLOGY

## 2025-05-05 PROCEDURE — 88112 CYTOPATH CELL ENHANCE TECH: CPT | Performed by: PATHOLOGY

## 2025-05-05 PROCEDURE — 86900 BLOOD TYPING SEROLOGIC ABO: CPT | Performed by: OBSTETRICS & GYNECOLOGY

## 2025-05-05 PROCEDURE — 38570 LAPAROSCOPY LYMPH NODE BIOP: CPT | Performed by: OBSTETRICS & GYNECOLOGY

## 2025-05-05 PROCEDURE — NC001 PR NO CHARGE: Performed by: OBSTETRICS & GYNECOLOGY

## 2025-05-05 PROCEDURE — S2900 ROBOTIC SURGICAL SYSTEM: HCPCS | Performed by: OBSTETRICS & GYNECOLOGY

## 2025-05-05 PROCEDURE — NC001 PR NO CHARGE: Performed by: PHYSICIAN ASSISTANT

## 2025-05-05 PROCEDURE — 86901 BLOOD TYPING SEROLOGIC RH(D): CPT | Performed by: OBSTETRICS & GYNECOLOGY

## 2025-05-05 PROCEDURE — 88307 TISSUE EXAM BY PATHOLOGIST: CPT | Performed by: PATHOLOGY

## 2025-05-05 RX ORDER — HEPARIN SODIUM 5000 [USP'U]/ML
5000 INJECTION, SOLUTION INTRAVENOUS; SUBCUTANEOUS
Status: COMPLETED | OUTPATIENT
Start: 2025-05-05 | End: 2025-05-05

## 2025-05-05 RX ORDER — MIDAZOLAM HYDROCHLORIDE 2 MG/2ML
INJECTION, SOLUTION INTRAMUSCULAR; INTRAVENOUS AS NEEDED
Status: DISCONTINUED | OUTPATIENT
Start: 2025-05-05 | End: 2025-05-05

## 2025-05-05 RX ORDER — DEXAMETHASONE SODIUM PHOSPHATE 10 MG/ML
INJECTION, SOLUTION INTRAMUSCULAR; INTRAVENOUS AS NEEDED
Status: DISCONTINUED | OUTPATIENT
Start: 2025-05-05 | End: 2025-05-05

## 2025-05-05 RX ORDER — BUPIVACAINE HYDROCHLORIDE 2.5 MG/ML
INJECTION, SOLUTION EPIDURAL; INFILTRATION; INTRACAUDAL; PERINEURAL AS NEEDED
Status: DISCONTINUED | OUTPATIENT
Start: 2025-05-05 | End: 2025-05-05 | Stop reason: HOSPADM

## 2025-05-05 RX ORDER — ACETAMINOPHEN 325 MG/1
975 TABLET ORAL EVERY 6 HOURS PRN
Status: DISCONTINUED | OUTPATIENT
Start: 2025-05-05 | End: 2025-05-05 | Stop reason: HOSPADM

## 2025-05-05 RX ORDER — DIPHENHYDRAMINE HYDROCHLORIDE 50 MG/ML
12.5 INJECTION, SOLUTION INTRAMUSCULAR; INTRAVENOUS ONCE AS NEEDED
Status: DISCONTINUED | OUTPATIENT
Start: 2025-05-05 | End: 2025-05-05 | Stop reason: HOSPADM

## 2025-05-05 RX ORDER — ROCURONIUM BROMIDE 10 MG/ML
INJECTION, SOLUTION INTRAVENOUS AS NEEDED
Status: DISCONTINUED | OUTPATIENT
Start: 2025-05-05 | End: 2025-05-05

## 2025-05-05 RX ORDER — SODIUM CHLORIDE 9 MG/ML
INJECTION, SOLUTION INTRAVENOUS CONTINUOUS PRN
Status: DISCONTINUED | OUTPATIENT
Start: 2025-05-05 | End: 2025-05-05

## 2025-05-05 RX ORDER — ACETAMINOPHEN 325 MG/1
975 TABLET ORAL ONCE
Status: COMPLETED | OUTPATIENT
Start: 2025-05-05 | End: 2025-05-05

## 2025-05-05 RX ORDER — FENTANYL CITRATE 50 UG/ML
INJECTION, SOLUTION INTRAMUSCULAR; INTRAVENOUS AS NEEDED
Status: DISCONTINUED | OUTPATIENT
Start: 2025-05-05 | End: 2025-05-05

## 2025-05-05 RX ORDER — OXYCODONE HYDROCHLORIDE 5 MG/1
5 TABLET ORAL EVERY 4 HOURS PRN
Qty: 5 TABLET | Refills: 0 | Status: SHIPPED | OUTPATIENT
Start: 2025-05-05 | End: 2025-05-15

## 2025-05-05 RX ORDER — OXYCODONE HYDROCHLORIDE 5 MG/1
10 TABLET ORAL EVERY 4 HOURS PRN
Refills: 0 | Status: DISCONTINUED | OUTPATIENT
Start: 2025-05-05 | End: 2025-05-05 | Stop reason: HOSPADM

## 2025-05-05 RX ORDER — PROPOFOL 10 MG/ML
INJECTION, EMULSION INTRAVENOUS AS NEEDED
Status: DISCONTINUED | OUTPATIENT
Start: 2025-05-05 | End: 2025-05-05

## 2025-05-05 RX ORDER — PHENYLEPHRINE HCL IN 0.9% NACL 1 MG/10 ML
SYRINGE (ML) INTRAVENOUS AS NEEDED
Status: DISCONTINUED | OUTPATIENT
Start: 2025-05-05 | End: 2025-05-05

## 2025-05-05 RX ORDER — KETOROLAC TROMETHAMINE 30 MG/ML
INJECTION, SOLUTION INTRAMUSCULAR; INTRAVENOUS AS NEEDED
Status: DISCONTINUED | OUTPATIENT
Start: 2025-05-05 | End: 2025-05-05

## 2025-05-05 RX ORDER — GLYCOPYRROLATE 0.2 MG/ML
INJECTION INTRAMUSCULAR; INTRAVENOUS AS NEEDED
Status: DISCONTINUED | OUTPATIENT
Start: 2025-05-05 | End: 2025-05-05

## 2025-05-05 RX ORDER — OXYCODONE HYDROCHLORIDE 5 MG/1
5 TABLET ORAL EVERY 4 HOURS PRN
Refills: 0 | Status: DISCONTINUED | OUTPATIENT
Start: 2025-05-05 | End: 2025-05-05 | Stop reason: HOSPADM

## 2025-05-05 RX ORDER — METRONIDAZOLE 500 MG/100ML
500 INJECTION, SOLUTION INTRAVENOUS ONCE
Status: COMPLETED | OUTPATIENT
Start: 2025-05-05 | End: 2025-05-05

## 2025-05-05 RX ORDER — OXYCODONE HYDROCHLORIDE 5 MG/1
5 TABLET ORAL EVERY 6 HOURS PRN
Qty: 5 TABLET | Refills: 0 | Status: SHIPPED | OUTPATIENT
Start: 2025-05-05 | End: 2025-05-15

## 2025-05-05 RX ORDER — IBUPROFEN 600 MG/1
600 TABLET, FILM COATED ORAL EVERY 6 HOURS PRN
Status: DISCONTINUED | OUTPATIENT
Start: 2025-05-05 | End: 2025-05-05 | Stop reason: HOSPADM

## 2025-05-05 RX ORDER — ONDANSETRON 2 MG/ML
INJECTION INTRAMUSCULAR; INTRAVENOUS AS NEEDED
Status: DISCONTINUED | OUTPATIENT
Start: 2025-05-05 | End: 2025-05-05

## 2025-05-05 RX ORDER — ONDANSETRON 2 MG/ML
4 INJECTION INTRAMUSCULAR; INTRAVENOUS ONCE AS NEEDED
Status: DISCONTINUED | OUTPATIENT
Start: 2025-05-05 | End: 2025-05-05 | Stop reason: HOSPADM

## 2025-05-05 RX ORDER — SODIUM CHLORIDE, SODIUM LACTATE, POTASSIUM CHLORIDE, CALCIUM CHLORIDE 600; 310; 30; 20 MG/100ML; MG/100ML; MG/100ML; MG/100ML
INJECTION, SOLUTION INTRAVENOUS CONTINUOUS PRN
Status: DISCONTINUED | OUTPATIENT
Start: 2025-05-05 | End: 2025-05-05

## 2025-05-05 RX ORDER — LIDOCAINE HYDROCHLORIDE 10 MG/ML
INJECTION, SOLUTION EPIDURAL; INFILTRATION; INTRACAUDAL; PERINEURAL AS NEEDED
Status: DISCONTINUED | OUTPATIENT
Start: 2025-05-05 | End: 2025-05-05

## 2025-05-05 RX ORDER — HYDROMORPHONE HCL/PF 1 MG/ML
0.5 SYRINGE (ML) INJECTION
Status: DISCONTINUED | OUTPATIENT
Start: 2025-05-05 | End: 2025-05-05 | Stop reason: HOSPADM

## 2025-05-05 RX ORDER — INDOCYANINE GREEN AND WATER 25 MG
KIT INJECTION AS NEEDED
Status: DISCONTINUED | OUTPATIENT
Start: 2025-05-05 | End: 2025-05-05 | Stop reason: HOSPADM

## 2025-05-05 RX ORDER — CEFAZOLIN SODIUM 2 G/50ML
2000 SOLUTION INTRAVENOUS ONCE
Status: COMPLETED | OUTPATIENT
Start: 2025-05-05 | End: 2025-05-05

## 2025-05-05 RX ORDER — FENTANYL CITRATE/PF 50 MCG/ML
50 SYRINGE (ML) INJECTION
Status: DISCONTINUED | OUTPATIENT
Start: 2025-05-05 | End: 2025-05-05 | Stop reason: HOSPADM

## 2025-05-05 RX ORDER — ACETAMINOPHEN 10 MG/ML
1000 INJECTION, SOLUTION INTRAVENOUS ONCE
Status: COMPLETED | OUTPATIENT
Start: 2025-05-05 | End: 2025-05-05

## 2025-05-05 RX ORDER — KETAMINE HCL IN NACL, ISO-OSM 100MG/10ML
SYRINGE (ML) INJECTION AS NEEDED
Status: DISCONTINUED | OUTPATIENT
Start: 2025-05-05 | End: 2025-05-05

## 2025-05-05 RX ORDER — SODIUM CHLORIDE, SODIUM LACTATE, POTASSIUM CHLORIDE, CALCIUM CHLORIDE 600; 310; 30; 20 MG/100ML; MG/100ML; MG/100ML; MG/100ML
125 INJECTION, SOLUTION INTRAVENOUS CONTINUOUS
Status: DISCONTINUED | OUTPATIENT
Start: 2025-05-05 | End: 2025-05-05 | Stop reason: HOSPADM

## 2025-05-05 RX ADMIN — Medication 100 MCG: at 08:29

## 2025-05-05 RX ADMIN — Medication 100 MCG: at 07:58

## 2025-05-05 RX ADMIN — METRONIDAZOLE: 500 SOLUTION INTRAVENOUS at 07:35

## 2025-05-05 RX ADMIN — ACETAMINOPHEN 975 MG: 325 TABLET, FILM COATED ORAL at 06:30

## 2025-05-05 RX ADMIN — ACETAMINOPHEN 1000 MG: 10 INJECTION INTRAVENOUS at 12:20

## 2025-05-05 RX ADMIN — HEPARIN SODIUM 5000 UNITS: 5000 INJECTION, SOLUTION INTRAVENOUS; SUBCUTANEOUS at 06:30

## 2025-05-05 RX ADMIN — DEXAMETHASONE SODIUM PHOSPHATE 10 MG: 10 INJECTION, SOLUTION INTRAMUSCULAR; INTRAVENOUS at 07:50

## 2025-05-05 RX ADMIN — Medication 100 MCG: at 07:54

## 2025-05-05 RX ADMIN — Medication 20 MG: at 07:42

## 2025-05-05 RX ADMIN — Medication 20 MG: at 08:41

## 2025-05-05 RX ADMIN — CEFAZOLIN SODIUM 2000 MG: 2 SOLUTION INTRAVENOUS at 07:45

## 2025-05-05 RX ADMIN — PROPOFOL 150 MG: 10 INJECTION, EMULSION INTRAVENOUS at 07:39

## 2025-05-05 RX ADMIN — ROCURONIUM BROMIDE 50 MG: 10 INJECTION, SOLUTION INTRAVENOUS at 07:39

## 2025-05-05 RX ADMIN — Medication 100 MCG: at 08:17

## 2025-05-05 RX ADMIN — ONDANSETRON 4 MG: 2 INJECTION INTRAMUSCULAR; INTRAVENOUS at 09:57

## 2025-05-05 RX ADMIN — PHENYLEPHRINE HYDROCHLORIDE 20 MCG/MIN: 10 INJECTION INTRAVENOUS at 08:28

## 2025-05-05 RX ADMIN — ROCURONIUM BROMIDE 20 MG: 10 INJECTION, SOLUTION INTRAVENOUS at 08:41

## 2025-05-05 RX ADMIN — SUGAMMADEX 200 MG: 100 INJECTION, SOLUTION INTRAVENOUS at 10:15

## 2025-05-05 RX ADMIN — KETOROLAC TROMETHAMINE 15 MG: 30 INJECTION, SOLUTION INTRAMUSCULAR; INTRAVENOUS at 10:00

## 2025-05-05 RX ADMIN — PROPOFOL 80 MCG/KG/MIN: 10 INJECTION, EMULSION INTRAVENOUS at 07:42

## 2025-05-05 RX ADMIN — LIDOCAINE HYDROCHLORIDE 60 MG: 10 INJECTION, SOLUTION EPIDURAL; INFILTRATION; INTRACAUDAL; PERINEURAL at 07:39

## 2025-05-05 RX ADMIN — SODIUM CHLORIDE, SODIUM LACTATE, POTASSIUM CHLORIDE, AND CALCIUM CHLORIDE: .6; .31; .03; .02 INJECTION, SOLUTION INTRAVENOUS at 07:20

## 2025-05-05 RX ADMIN — FENTANYL CITRATE 50 MCG: 50 INJECTION INTRAMUSCULAR; INTRAVENOUS at 07:39

## 2025-05-05 RX ADMIN — ROCURONIUM BROMIDE 20 MG: 10 INJECTION, SOLUTION INTRAVENOUS at 09:33

## 2025-05-05 RX ADMIN — GLYCOPYRROLATE 0.1 MG: 0.2 INJECTION, SOLUTION INTRAMUSCULAR; INTRAVENOUS at 08:04

## 2025-05-05 RX ADMIN — Medication 10 MG: at 09:33

## 2025-05-05 RX ADMIN — FENTANYL CITRATE 50 MCG: 50 INJECTION INTRAMUSCULAR; INTRAVENOUS at 10:53

## 2025-05-05 RX ADMIN — MIDAZOLAM 2 MG: 1 INJECTION INTRAMUSCULAR; INTRAVENOUS at 07:28

## 2025-05-05 RX ADMIN — FENTANYL CITRATE 50 MCG: 50 INJECTION INTRAMUSCULAR; INTRAVENOUS at 08:04

## 2025-05-05 RX ADMIN — SODIUM CHLORIDE: 0.9 INJECTION, SOLUTION INTRAVENOUS at 07:41

## 2025-05-05 NOTE — ASSESSMENT & PLAN NOTE
Continue Mounjaro postoperatively  Lab Results   Component Value Date    HGBA1C 6.6 (H) 04/29/2025

## 2025-05-05 NOTE — ANESTHESIA PREPROCEDURE EVALUATION
"Procedure:  ROBOTIC ASSISTED TOTAL LAPAROSCOPIC HYSTERECTOMY, BILATERAL SALPINGO-OOPHORECTOMY, LYMPH NODE DISSECTION, EXAM UNDER ANESTHESIA, POSSIBLE EXPLORATORY LAPAROTOMY AND ALL OTHER INDICATED PROCEDURES (Abdomen)    Relevant Problems   ANESTHESIA (within normal limits)      CARDIO   (+) Hyperlipidemia      ENDO (within normal limits)      GI/HEPATIC (within normal limits)      /RENAL (within normal limits)      GYN   (+) Endometrial cancer (HCC)      HEMATOLOGY (within normal limits)      MUSCULOSKELETAL (within normal limits)      NEURO/PSYCH   (+) Diabetic polyneuropathy associated with type 2 diabetes mellitus (HCC)   (+) QUANG (generalized anxiety disorder)      PULMONARY   (+) Shortness of breath      Recent labs personally reviewed:  Lab Results   Component Value Date    WBC 9.33 04/29/2025    HGB 14.3 04/29/2025     04/29/2025     Lab Results   Component Value Date    K 4.7 03/10/2025    BUN 14 03/10/2025    CREATININE 0.88 03/10/2025     No results found for: \"PTT\"   No results found for: \"INR\"    Lab Results   Component Value Date    HGBA1C 6.6 (H) 04/29/2025       Type and Screen:  B      Physical Exam    Airway    Mallampati score: II  TM Distance: >3 FB  Neck ROM: full     Dental   No notable dental hx     Cardiovascular      Pulmonary      Other Findings  post-pubertal.      Anesthesia Plan  ASA Score- 2     Anesthesia Type- general with ASA Monitors.         Additional Monitors:     Airway Plan: ETT.           Plan Factors-Exercise tolerance (METS): >4 METS.    Chart reviewed.  Imaging results reviewed. Existing labs reviewed. Patient summary reviewed.                  Induction- intravenous.    Postoperative Plan- Plan for postoperative opioid use. Planned trial extubation        Informed Consent- Anesthetic plan and risks discussed with patient.  I personally reviewed this patient with the CRNA. Discussed and agreed on the Anesthesia Plan with the CRNA..      NPO Status:  Vitals Value " Taken Time   Date of last liquid 05/04/25 05/05/25 0616   Time of last liquid 2355 05/05/25 0616   Date of last solid 05/04/25 05/05/25 0616   Time of last solid 2300 05/05/25 0616

## 2025-05-05 NOTE — OP NOTE
OPERATIVE REPORT  PATIENT NAME: Yolanda Hubbard    :  1970  MRN: [id number]  Pt Location: [facility] OR ROOM 14    SURGERY DATE: 2025    Surgeons and Role:     * Cl Fernandez MD - Primary     * Samantha Braxton PA-C - Assisting     * Reena Magaña MD - Assisting     * Denisa Oliva MD - Assisting     * Connie Reynaga MD    Preop Diagnosis:  Endometrial cancer (HCC) [C54.1]    Post-Op Diagnosis Codes:     * Endometrial cancer (HCC) [C54.1]    Procedure(s):  ROBOTIC ASSISTED TOTAL LAPAROSCOPIC HYSTERECTOMY. BILATERAL SALPINGO-OOPHORECTOMY. BILATERAL SENTINEL PELVIC LYMPH NODE DISSECTION. CYSTOSCOPY  LYMPHOGRAPHY WITH INDOCYANINE GREEN (ICG)    Specimen(s):  ID Type Source Tests Collected by Time Destination   1 :  Washing Pelvic Washing NON-GYNECOLOGIC CYTOLOGY Cl Fernandez MD 2025 0819    2 : left pelvic sentinel node Tissue Lymph Node, [place] TISSUE EXAM Cl Fernandez MD 2025 0836    3 : right pelvic sentinel node Tissue Lymph Node, [place] TISSUE EXAM Cl Fernandez MD 2025 0849    4 : Uterus w/Bilateral Ovaries, cervix and Fallopian Tubes Tissue Uterus w/Bilateral Ovaries and Fallopian Tubes TISSUE EXAM Cl Fernandez MD 2025 0934        Estimated Blood Loss:   20 mL    Drains:  [REMOVED] Urethral Catheter Non-latex;Straight-tip 16 Fr. (Removed)   Number of days: 0       Anesthesia Type:   General    Operative Indications:  Endometrial cancer (HCC) [C54.1]  55yo with hx of endometrial cancer. The results of her diagnostic testing, her differential diagnosis and treatment options, and the benefits and risks of these were reviewed. She has a good understanding and has agreed to proceed with definitive surgical management of endometrial cancer with a robotic TLH/BSO, SLND     Operative Findings:  Normal vulva, vagina, cervix.  Uterus sounded to 8 cm.  Atraumatic entry.  Grossly normal abdominal survey including bowel, omentum, liver,  gallbladder.  Normal appearing upper abdomen.   Normal appearing uterus, bilateral fallopian tubes and ovaries. No evidence of extra-uterine disease.   [place] lymph node mapping bilaterally to external iliac nodes.  Both ureters visualized vermiculation throughout the case.  Hemostasis at the conclusion of the case.  Cystoscopy with grossly normal bladder, both ureteral orifices visualized and bilateral uterine jets visualized, too.     Complications:   None     Procedure and Technique:  The patient was brought to the Operating Room where general anesthesia was induced. The abdomen, vagina and perineum were prepped and draped. Attention was turned to patient's vagina.  Atraumatic vaginal retractors were placed to identify the cervix which was grasped with a single tooth tenaculum. The cervix was easily dilated and the uterus sounded to 8 cm.  ICG dye diluted with 20 cc of sterile water was injected at 3:00 and 9:00 of cervix.  One mL was injected approx 1 cm deep and additionally injected superficially.  Total of 4 mL was injected.  A small Robert ring was placed around the cervix.  Then RAMYA cannula was inserted in the uterine cavity without difficulty. The RAMYA balloon was then inflated with water until resistance was met. A Manzo catheter was placed in a sterile fashion.     An incision was made superior to the umbilicus, and a 25 cm umbilicus above the pubic symphysis. With the abdomen under anterior traction the laparoscope was introduced under direct visualization.  Intra-abdominal placement was confirmed visually and with low abdominal pressure. The peritoneal cavity was then insufflated with carbon dioxide on high flow to maintain a pressure of 15 mm Hg throughout the case.  An incision was made 25 cm above the pubic symphysis, through which the robotic trocar was introduced into the abdominal cavity. The laparoscope was then inserted and the peritoneal cavity explored with the above findings. There was no  evidence of visceral injury. Additional robotic ports and a 5mm assistant port were placed under direct visualization.  With the patient in steep Trendelenburg, the robot was docked to the robotic ports and the instruments were placed under direct visualization.     Careful survey of abdomen and pelvis was performed to reveal above notable findings.   The peritoneum was incised in planes lateral and parallel to the ovarian vessels on both sides.  Retroperitoneal space was entered. Avascular spaces were opened.  The ureters were then identified retroperitoneally, coursing well inferiorly to the ovarian vessels.  Firefly camera was activated on robotic system.  Pelvic sentinel lymph nodes were detected bilaterally. Nodes were carefully removed using monopolar cautery taking care to avoid neuronal and vascular injury.      With the uterus on upward traction, a window was created superior to the ureter to skeleontize the ovarian vessels which were then cauterized and divided using the synchroseal. The round ligaments were isolated, cauterized and cut. The posterior peritoneum was dropped to the level of the koh ring. The vesico-uterine peritoneum was incised, and the bladder dissected from the cervix and upper vagina in a meticulous fashion to the level of the koh ring. The uterine vessels were then skeletonized bilaterally and coagulated at the level of the koh ring using bipolar current, then divided. With the bladder mobilized anteriorally and the rectum well away posteriorally, using the monopolar device, an incision was made in the anterior upper vagina at the level of the cervical-vaginal junction.  The incision was continued circumferentially around the upper vagina, controlling upper vaginal blood supply laterally using the biopolar. This allowed completely amputation of the specimen. The vaginal balloon was removed. The uterus, cervix, bilateral fallopian tubes and ovaries were then removed en bloc  transvaginally.     The upper vagina was then closed laparoscopically with stratafix, taking care to avoid bladder injury. The upper vagina was intact and hemostatic. The vaginal balloon was removed.      A cystourethroscopy was performed with intact bladder mucosa, no gross lesions and bilateral ureteral efflux.     The robotic instruments were removed, and the robot undocked. The fascia at the 12 mm ports site was closed with 0 vicryl stitch. The laparoscopic skin incisions were irrigated and made hemostatic using electrocoagulation. They were closed with subcuticular stitches of 4-0 monocryl.      All sponge, needle and instrument counts were correct x2.  Anesthesia was reversed and the patient was taken to the PACU in a stable condition.     Dr. Fernandez was present for the entire procedure.     Patient Disposition:  PACU      SIGNATURE: Denisa Oliva MD  DATE: May 5, 2025  TIME: 10:00 AM

## 2025-05-05 NOTE — ANESTHESIA POSTPROCEDURE EVALUATION
Post-Op Assessment Note    CV Status:  Stable  Pain Score: 0    Pain management: adequate       Mental Status:  Sleepy and arousable   Hydration Status:  Euvolemic   PONV Controlled:  Controlled   Airway Patency:  Patent     Post Op Vitals Reviewed: Yes    No anethesia notable event occurred.    Staff: CRNA, Anesthesiologist           Last Filed PACU Vitals:  Vitals Value Taken Time   Temp 97.6 °F (36.4 °C) 05/05/25 1025   Pulse 77 05/05/25 1028   /56 05/05/25 1027   Resp 25 05/05/25 1028   SpO2 100 % 05/05/25 1028   Vitals shown include unfiled device data.    Modified Radha:     Vitals Value Taken Time   Activity 2 05/05/25 1025   Respiration 2 05/05/25 1025   Circulation 1 05/05/25 1025   Consciousness 1 05/05/25 1025   Oxygen Saturation 1 05/05/25 1025     Modified Radha Score: 7

## 2025-05-05 NOTE — ASSESSMENT & PLAN NOTE
54-year-old with biopsy-proven grade 1 endometrial cancer, pMMR, p53 wild-type.  Her performance status is 0.  1.  Plan for examination under anesthesia, robotic assisted total laparoscopic hysterectomy, bilateral salpingo-oophorectomy, lymph node dissection, possible exploratory laparotomy and all other indicated procedures.  2.  She understands that she will not require Megace postoperatively

## 2025-05-05 NOTE — OP NOTE
OPERATIVE REPORT  PATIENT NAME: Yolanda Hubbard    :  1970  MRN: [id number]  Pt Location: [facility] OR ROOM 14    SURGERY DATE: 2025    Surgeons and Role:     * Cl Fernandez MD - Primary     * Samantha Braxton PA-C - Assisting     * Reena Magaña MD - Assisting     * Denisa Oliva MD - Assisting     * Connie Reynaga MD    Preop Diagnosis:  Endometrial cancer (HCC) [C54.1]    Post-Op Diagnosis Codes:     * Endometrial cancer (HCC) [C54.1]    Procedure(s):  ROBOTIC ASSISTED TOTAL LAPAROSCOPIC HYSTERECTOMY. BILATERAL SALPINGO-OOPHORECTOMY. LYMPH NODE DISSECTION. EXAM UNDER ANESTHESIA    Specimen(s):  ID Type Source Tests Collected by Time Destination   1 :  Washing Pelvic Washing NON-GYNECOLOGIC CYTOLOGY Cl Fernandez MD 2025 0819    2 : left pelvic sentinel node Tissue Lymph Node, [place] TISSUE EXAM Cl Fernandez MD 2025 0836    3 : right pelvic sentinel node Tissue Lymph Node, [place] TISSUE EXAM Cl Fernandez MD 2025 0849    4 : Uterus w/Bilateral Ovaries, cervix and Fallopian Tubes Tissue Uterus w/Bilateral Ovaries and Fallopian Tubes TISSUE EXAM Cl Fernandez MD 2025 0934        Estimated Blood Loss:   Minimal    Drains:  Urethral Catheter Non-latex;Straight-tip 16 Fr. (Active)   Number of days: 0       Anesthesia Type:   General    Operative Indications:  Endometrial cancer (HCC) [C54.1]  53yo with hx of endometrial cancer. The results of her diagnostic testing, her differential diagnosis and treatment options, and the benefits and risks of these were reviewed. She has a good understanding and has agreed to proceed with definitive surgical management of endometrial cancer with a robotic TLH/BSO, SLND    Operative Findings:  Normal vulva, vagina, cervix.  Uterus sounded to 8 cm.  Atraumatic entry.  Grossly normal abdominal survey including bowel, omentum, liver, gallbladder.  Normal appearing upper abdomen.   Normal appearing  uterus, bilateral fallopian tubes and ovaries. No evidence of extra-uterine disease.   [place] lymph node mapping bilaterally to external iliac nodes.  Both ureters visualized vermiculation throughout the case.  Hemostasis at the conclusion of the case.  Cystoscopy with grossly normal bladder, both ureteral orifices visualized and bilateral uterine jets visualized, too.    Complications:   None    Procedure and Technique:  The patient was brought to the Operating Room where general anesthesia was induced. The abdomen, vagina and perineum were prepped and draped. Attention was turned to patient's vagina.  Atraumatic vaginal retractors were placed to identify the cervix which was grasped with a single tooth tenaculum. The cervix was easily dilated and the uterus sounded to 8 cm.  ICG dye diluted with 20 cc of sterile water was injected at 3:00 and 9:00 of cervix.  One mL was injected approx 1 cm deep and additionally injected superficially.  Total of 4 mL was injected.  A small Robert ring was placed around the cervix.  Then RAMYA cannula was inserted in the uterine cavity without difficulty. The RAMYA balloon was then inflated with water until resistance was met. A Manzo catheter was placed in a sterile fashion.    An incision was made superior to the umbilicus, and a 25 cm umbilicus above the pubic symphysis. With the abdomen under anterior traction the laparoscope was introduced under direct visualization.  Intra-abdominal placement was confirmed visually and with low abdominal pressure. The peritoneal cavity was then insufflated with carbon dioxide on high flow to maintain a pressure of 15 mm Hg throughout the case.  An incision was made 25 cm above the pubic symphysis, through which the robotic trocar was introduced into the abdominal cavity. The laparoscope was then inserted and the peritoneal cavity explored with the above findings. There was no evidence of visceral injury. Additional robotic ports and a 5mm  assistant port were placed under direct visualization.  With the patient in steep Trendelenburg, the robot was docked to the robotic ports and the instruments were placed under direct visualization.    Careful survey of abdomen and pelvis was performed to reveal above notable findings.   The peritoneum was incised in planes lateral and parallel to the ovarian vessels on both sides.  Retroperitoneal space was entered. Avascular spaces were opened.  The ureters were then identified retroperitoneally, coursing well inferiorly to the ovarian vessels.  Firefly camera was activated on robotic system.  Pelvic sentinel lymph nodes were detected bilaterally. Nodes were carefully removed using monopolar cautery taking care to avoid neuronal and vascular injury.     With the uterus on upward traction, a window was created superior to the ureter to skeleontize the ovarian vessels which were then cauterized and divided using the synchroseal. The round ligaments were isolated, cauterized and cut. The posterior peritoneum was dropped to the level of the koh ring. The vesico-uterine peritoneum was incised, and the bladder dissected from the cervix and upper vagina in a meticulous fashion to the level of the koh ring. The uterine vessels were then skeletonized bilaterally and coagulated at the level of the koh ring using bipolar current, then divided. With the bladder mobilized anteriorally and the rectum well away posteriorally, using the monopolar device, an incision was made in the anterior upper vagina at the level of the cervical-vaginal junction.  The incision was continued circumferentially around the upper vagina, controlling upper vaginal blood supply laterally using the biopolar. This allowed completely amputation of the specimen. The vaginal balloon was removed. The uterus, cervix, bilateral fallopian tubes and ovaries were then removed en bloc transvaginally.    The upper vagina was then closed laparoscopically with  stratafix, taking care to avoid bladder injury. The upper vagina was intact and hemostatic. The vaginal balloon was removed.     A cystourethroscopy was performed with intact bladder mucosa, no gross lesions and bilateral ureteral efflux.    The robotic instruments were removed, and the robot undocked. The fascia at the 12 mm ports site was closed with 0 vicryl stitch. The laparoscopic skin incisions were irrigated and made hemostatic using electrocoagulation. They were closed with subcuticular stitches of 4-0 monocryl.     All sponge, needle and instrument counts were correct x2.  Anesthesia was reversed and the patient was taken to the PACU in a stable condition.    Dr. Fernandez was present for the entire procedure.    Patient Disposition:  PACU     SIGNATURE: Denisa Oliva MD  DATE: May 5, 2025  TIME: 10:00 AM

## 2025-05-05 NOTE — ANESTHESIA POSTPROCEDURE EVALUATION
Post-Op Assessment Note    CV Status:  Stable    Pain management: adequate       Mental Status:  Alert and awake   Hydration Status:  Euvolemic   PONV Controlled:  Controlled   Airway Patency:  Patent     Post Op Vitals Reviewed: Yes    No anethesia notable event occurred.    Staff: Anesthesiologist, with CRNAs           Last Filed PACU Vitals:  Vitals Value Taken Time   Temp 97.8 °F (36.6 °C) 05/05/25 1045   Pulse 69 05/05/25 1047   /54 05/05/25 1046   Resp 20 05/05/25 1047   SpO2 100 % 05/05/25 1047   Vitals shown include unfiled device data.    Modified Radha:     Vitals Value Taken Time   Activity 2 05/05/25 1045   Respiration 2 05/05/25 1045   Circulation 2 05/05/25 1045   Consciousness 2 05/05/25 1045   Oxygen Saturation 2 05/05/25 1045     Modified Radha Score: 10

## 2025-05-05 NOTE — H&P
H&P - GYN Oncology   Name: Yolanda Hubbard 54 y.o. female I MRN: [id number]  Unit/Bed#: OR POOL I Date of Admission: 5/5/2025   Date of Service: 5/5/2025 I Hospital Day: 0     Assessment & Plan  Endometrial cancer (HCC)  54-year-old with biopsy-proven grade 1 endometrial cancer, pMMR, p53 wild-type.  Her performance status is 0.  1.  Plan for examination under anesthesia, robotic assisted total laparoscopic hysterectomy, bilateral salpingo-oophorectomy, lymph node dissection, possible exploratory laparotomy and all other indicated procedures.  2.  She understands that she will not require Megace postoperatively  Diabetic polyneuropathy associated with type 2 diabetes mellitus (HCC)  Continue Mounjaro postoperatively  Lab Results   Component Value Date    HGBA1C 6.6 (H) 04/29/2025       History of Present Illness   Chief Complaint: Here for surgery  Yolanda Hubbard is a 54 y.o. female who presents with biopsy-proven grade 1 endometrial cancer.  She was diagnosed in December 2024 and desired to postpone the surgery.  She was therefore started on Megace in January 2025.  Her abnormal uterine bleeding resolved.  No other interval change in medications or medical history since her last visit to the office.  No complaints today.        Review of Systems   Constitutional:  Negative for activity change and unexpected weight change.   HENT: Negative.     Eyes: Negative.    Respiratory: Negative.     Cardiovascular: Negative.    Gastrointestinal:  Negative for abdominal distention and abdominal pain.   Endocrine: Negative.    Genitourinary:  Negative for pelvic pain and vaginal bleeding.   Musculoskeletal: Negative.    Skin: Negative.    Allergic/Immunologic: Negative.    Neurological: Negative.    Hematological: Negative.    Psychiatric/Behavioral: Negative.       Historical Information   Past Medical History:   Diagnosis Date    Diabetes mellitus (HCC)     Hyperlipidemia     Hypertension     Lyme disease     treated 6 years ago - 1+  month medication     Past Surgical History:   Procedure Laterality Date    CHOLECYSTECTOMY      COLONOSCOPY      DILATION AND CURETTAGE OF UTERUS      LAPAROSCOPY      [place] HYSTEROSCOPY BX ENDOMETRIUM&/POLYPC W/WO D&C N/A 2024    Procedure: EXAM UNDER ANESTHESIA; DILATATION AND CURETTAGE (D&C) WITH HYSTEROSCOPY;  Surgeon: Zuri Lyon MD;  Location:  MAIN OR;  Service: Gynecology    WISDOM TOOTH EXTRACTION       Social History     Tobacco Use    Smoking status: Former     Current packs/day: 0.00     Average packs/day: 1 pack/day for 18.0 years (18.0 ttl pk-yrs)     Types: Cigarettes     Start date: 3/25/1991     Quit date: 3/25/2009     Years since quittin.1    Smokeless tobacco: Never   Vaping Use    Vaping status: Never Used   Substance and Sexual Activity    Alcohol use: Never    Drug use: Never    Sexual activity: Not Currently     E-Cigarette/Vaping    E-Cigarette Use Never User      E-Cigarette/Vaping Substances    Nicotine No     THC No     CBD No     Flavoring No     Other No     Unknown No      Family history non-contributory  Oncology History:   Cancer Staging   No matching staging information was found for the patient.    Oncology History    No history exists.     Current Facility-Administered Medications   Medication Dose Route Frequency Provider Last Rate Last Admin    ceFAZolin (ANCEF) IVPB (premix in dextrose) 2,000 mg 50 mL  2,000 mg Intravenous Once Cl Fernandez MD        lactated ringers infusion  125 mL/hr Intravenous Continuous Cl Fernandez MD        metroNIDAZOLE (FLAGYL) IVPB (premix) 500 mg 100 mL  500 mg Intravenous Once Cl Fernandez MD         Facility-Administered Medications Ordered in Other Encounters   Medication Dose Route Frequency Provider Last Rate Last Admin    lactated ringers infusion   Intravenous Continuous PRN Patricia Trevino CRNA   New Bag at 25 0793       Objective :  Temp:  [97.4 °F (36.3 °C)] 97.4 °F (36.3  "°C)  HR:  [71] 71  BP: (96)/(52) 96/52  Resp:  [18] 18  SpO2:  [99 %] 99 %  O2 Device: None (Room air)    Physical Exam  Vitals reviewed.   Constitutional:       General: She is not in acute distress.     Appearance: Normal appearance. She is normal weight. She is not ill-appearing, toxic-appearing or diaphoretic.   HENT:      Head: Normocephalic and atraumatic.      Mouth/Throat:      Mouth: Mucous membranes are dry.   Eyes:      Extraocular Movements: Extraocular movements intact.   Cardiovascular:      Rate and Rhythm: Normal rate and regular rhythm.      Heart sounds: Normal heart sounds.   Pulmonary:      Effort: Pulmonary effort is normal.      Breath sounds: Normal breath sounds.   Abdominal:      Palpations: Abdomen is soft.   Musculoskeletal:      Cervical back: Normal range of motion and neck supple.      Right lower leg: No edema.      Left lower leg: No edema.   Skin:     General: Skin is warm and dry.      Coloration: Skin is not jaundiced or pale.      Findings: No bruising or erythema.   Neurological:      General: No focal deficit present.      Mental Status: She is alert and oriented to person, place, and time. Mental status is at baseline.   Psychiatric:         Mood and Affect: Mood normal.         Behavior: Behavior normal.         Thought Content: Thought content normal.         Judgment: Judgment normal.           Lab Results: I have reviewed the following results:  No results found for: \"\"  Lab Results   Component Value Date    K 4.7 03/10/2025     03/10/2025    CO2 24 03/10/2025    BUN 14 03/10/2025    CREATININE 0.88 03/10/2025    GLUF 135 (H) 01/22/2025    CALCIUM 10.1 03/10/2025    AST 20 03/10/2025    ALT 19 03/10/2025    ALKPHOS 50 03/10/2025    EGFR 77 03/10/2025     Lab Results   Component Value Date    WBC 9.33 04/29/2025    HGB 14.3 04/29/2025    HCT 41.1 04/29/2025    MCV 89 04/29/2025     04/29/2025     Lab Results   Component Value Date    NEUTROABS 3.07 " "04/12/2020        Trend:  No results found for: \"\"  Imaging Results Review: I personally reviewed the following image studies in PACS and associated radiology reports: Ultrasound(s). My interpretation of the radiology images/reports is: Normal-appearing uterus, thickened endometrium.  Other Study Results Review: Pathology reports reviewed.      "

## 2025-05-05 NOTE — DISCHARGE INSTR - AVS FIRST PAGE
[facility] - Gynecologic Oncology  Dayton Knight, Erik Wong and Janelle   [phone]    Hysterectomy Discharge Instructions    A hysterectomy is surgery to remove your uterus. Your ovaries, fallopian tubes, cervix, or part of your vagina may also need to be removed. The organs and tissue that will be removed depends on your medical condition.  After a hysterectomy, you will not be able to become pregnant.  If your ovaries are removed, you will go through menopause if you have not already.    DISCHARGE INSTRUCTIONS:     What to expect at home:   Recovery from surgery is generally 2-4 weeks, but sometimes longer for more strenuous activity. It is normal to be very tired during this time.   If you had laparoscopic/robotic surgery, you may experience gas pain, abdominal swelling, or shoulder pain for 24-72 hours after surgery. This is from the carbon dioxide gas put into your abdomen to better visualize your organs. A warm shower, heating pad, and/or walking may help.    Contact your doctor at the number above if:   You have a fever over 100.4o.  You have nausea or vomiting that does not improve after a light meal.  Your abdominal or pelvic pain gets worse, even after you take medicine.  You feel pain or burning when you urinate, or you have trouble urinating that worsens over time. Some burning in the first 1-2 days after surgery is expected after removal of the bladder catheter during surgery.  You have pus or a foul-smelling odor coming from your vagina.  Your wound is red, swollen, or drainage is persistent, thick/cloudy or foul smelling.  Clear/pink drainage or a minimal amount of bleeding from incisions can be normal after laparoscopic surgery.  Your arm or leg feels warm, tender, red, swollen and/or painful.   You have heavy vaginal bleeding that fills 1 or more sanitary pads in 1 hour. It is normal to have a small amount of vaginal bleeding or discharge after surgery  that would require the use of a light pantiliner. This discharge may last up to 6 weeks. The bleeding and discharge should be light and should have no odor.     CALL 911 OR GO TO THE EMERGENCY ROOM IF YOU HAVE: Any shortness of breath, difficulty breathing, or chest pain.    Pain  Pain after surgery is a challenging part of the healing process. Pain may be present for weeks but should gradually get better.   Please take extra strength acetaminophen (Tylenol) 1000 mg every 6 hours and then alternate with a NSAIDs such as ibuprofen (Advil, Motrin) 600 mg (3 tablets) every 6 hours to help decrease swelling, pain, and fever.   NSAIDs can cause stomach bleeding or kidney problems in certain people. If you take blood thinner medicine, always ask your healthcare provider if NSAIDs are safe for you. Always read the medicine label and follow directions.  The maximum dose of Tylenol is 4000 mg in 24 hours. The maximum dose of Advil/Motrin is 2400mg in 24 hours.   For moderate to severe pain, please take oxycodone 5 mg PO every 4-6 hours as needed or other narcotic that was prescribed by provider.     Anticoagulation    If provided with a prescription for anticoagulation such as Apixaban (Eliquis), please take the prescribed dose until completed.     Constipation  Constipation is common and it is normal to not have a bowel movement for up to one week after surgery. You should still be passing gas despite constipation and should be able to tolerate both liquid and solid food without nausea or vomiting.  Please take polyethylene glycol (Miralax) 17 g (1 measured capful or 1 packet) once a day. If you have not had a bowel movement 3 days after surgery, you may take the Miralax two times a day. The alternatives to Miralax include Senna and Colace.     If you have any discomfort because of the need to have a bowel movement, you may add milk of magnesia or magnesium citrate (available at your local pharmacy without a prescription)  at any time. Do not take milk of magnesia or magnesium citrate if you have kidney failure.   If you have loose or watery stools, stop taking the medications.     Take your medicine as directed. Contact your healthcare provider if you think your medicine is not helping or if you have side effects. Tell him or her if you are allergic to any medicine. Keep a list of the medicines, vitamins, and herbs you take. Include the amounts, and when and why you take them. Bring the list of the pill bottles to follow-up visits. Carry your medicine list with you in case of an emergency.    Activity:   Rest as needed. Get up and move around as directed to help prevent blood clots. Start with short walks and slowly increase the distance every day.     Stairs are okay to use. Use two feet on each stair to go up and down during the first several days to weeks after surgery.     Do not lift objects heavier than 10 pounds for 6 weeks whether you have small, laparoscopic incisions or you have a large, laparotomy incision.    Avoid strenuous activity for 2 weeks.     You may shower as soon as you return home. You can use soapy water surrounding the incision and let the water run over it. Pat the incision dry after your shower (see wound care section below)     Do not strain during bowel movements. High-fiber foods and extra liquids can help you prevent constipation. Examples of high-fiber foods are fruit and bran. Prune juice and water are good liquids to drink.      Do not have sex, use tampons, or douche for up to 8 weeks.   Do not go into pools or hot tubs for up to 8 weeks and/or until you have been cleared by your doctor.      It is generally safe to drive if you feel strong enough and your reaction time is not compromised and when no longer taking prescription/opioid pain medication    Ask when you may return to work and to other regular activities.    Wound care:   Care for your abdominal incisions as directed. Carefully wash  around the wound with soap and water. If you have Hibiclens or medicated soap that you were instructed to use before surgery, you may use that to wash with for up to 2 days after surgery.  If not, any mild non-scented, non-abrasive soap is safe.  It is okay to let the soap and water run over your incision. Do not scrub your incision. Dry the area and put on new, clean bandages as directed. Change your bandages when they get wet or dirty.     If you have surgical glue over your incision, this will start to flake off 7-10 days postoperatively. When this occurs, you can peel off the remaining glue.    Surgical glue reactions are common. If this occurs, please take a dose of Benadryl 25-50 mg every 4-6 hours. The maximum dose of Benadryl is 300mg/day. You may also apply over-the-counter hydrocortisone around the incision area (not on top of the incision).     Check your incision every day for redness, swelling, or pus.     Deep breathing:   Take deep breaths and cough 10 times each hour. This will decrease your risk of a lung infection as this will open your airway. Take a deep breath and hold it for as long as you can. Let the air out and then cough strongly.     You may be given an incentive spirometer to help you take deep breaths. Put the plastic piece in your mouth and take a slow, deep breath, then let the air out and cough.     Get support:   This surgery may be life-changing for you and your family. You will no longer be able to get pregnant. Sudden changes in the levels of your hormones may occur and cause mood swings and depression. You may feel angry, sad, frightened, or cry frequently and unexpectedly. These feelings are normal. Talk to your healthcare provider about where you can get support. You can also ask if hormone replacement medicine is right for you. Write down your questions so you remember to ask them during your visits.

## 2025-05-07 PROCEDURE — 88342 IMHCHEM/IMCYTCHM 1ST ANTB: CPT | Performed by: PATHOLOGY

## 2025-05-07 PROCEDURE — 88309 TISSUE EXAM BY PATHOLOGIST: CPT | Performed by: PATHOLOGY

## 2025-05-07 PROCEDURE — 88112 CYTOPATH CELL ENHANCE TECH: CPT | Performed by: PATHOLOGY

## 2025-05-07 PROCEDURE — 88307 TISSUE EXAM BY PATHOLOGIST: CPT | Performed by: PATHOLOGY

## 2025-05-08 ENCOUNTER — DOCUMENTATION (OUTPATIENT)
Dept: HEMATOLOGY ONCOLOGY | Facility: CLINIC | Age: 55
End: 2025-05-08

## 2025-05-08 NOTE — PROGRESS NOTES
In-basket message received from Dr. Fernandez to add patient to the next available gyn [facility] on 5/19/2025. Chart reviewed and prep completed.

## 2025-05-20 ENCOUNTER — DOCUMENTATION (OUTPATIENT)
Dept: GYNECOLOGIC ONCOLOGY | Facility: CLINIC | Age: 55
End: 2025-05-20

## 2025-05-20 NOTE — PROGRESS NOTES
Multidisciplinary Gynecologic Oncology Tumor Case Review       Physician Recommended Plan     Yolanda Hubbard is a 54 y.o. female     Diagnosis: Endometrial adenocarcinoma, endometrioid type, FIGO grade 1  stage IB     Patient was discussed at the Multidisciplinary Gynecologic Oncology Case review on 5/19/2025. The group recommended routine observation.     Follow-up appointment with Dr Fernandez  on 5/21/2025.     NCCN guidelines were available for review.     The final treatment plan will be left to the discretion of the patient and the treating physician.       DISCLAIMERS:    TO THE TREATING PHYSICIAN:  This conference is a meeting of clinicians from various specialty areas who evaluate and discuss patients for whom a multidisciplinary treatment approach is being considered. Please note that the above opinion was a consensus of the conference attendees and is intended only to assist in quality care of the discussed patient.  The responsibility for follow up on the input given during the conference, along with any final decisions regarding plan of care, is that of the patient and the patient's provider. Accordingly, appointments have only been recommended based on this information and have NOT been scheduled unless otherwise noted.      TO THE PATIENT:  This summary is a brief record of major aspects of your cancer treatment. You may choose to share a copy with any of your doctors or nurses. However, this is not a detailed or comprehensive record of your care.

## 2025-05-21 ENCOUNTER — TELEPHONE (OUTPATIENT)
Age: 55
End: 2025-05-21

## 2025-05-21 ENCOUNTER — DOCUMENTATION (OUTPATIENT)
Dept: HEMATOLOGY ONCOLOGY | Facility: CLINIC | Age: 55
End: 2025-05-21

## 2025-05-21 ENCOUNTER — OFFICE VISIT (OUTPATIENT)
Dept: GYNECOLOGIC ONCOLOGY | Facility: CLINIC | Age: 55
End: 2025-05-21
Payer: COMMERCIAL

## 2025-05-21 ENCOUNTER — TELEPHONE (OUTPATIENT)
Dept: GYNECOLOGIC ONCOLOGY | Facility: CLINIC | Age: 55
End: 2025-05-21

## 2025-05-21 VITALS
OXYGEN SATURATION: 97 % | WEIGHT: 137 LBS | BODY MASS INDEX: 25.06 KG/M2 | SYSTOLIC BLOOD PRESSURE: 128 MMHG | DIASTOLIC BLOOD PRESSURE: 82 MMHG | TEMPERATURE: 98.1 F | HEART RATE: 81 BPM

## 2025-05-21 DIAGNOSIS — C54.1 ENDOMETRIAL CANCER (HCC): Primary | ICD-10-CM

## 2025-05-21 PROCEDURE — 99214 OFFICE O/P EST MOD 30 MIN: CPT | Performed by: OBSTETRICS & GYNECOLOGY

## 2025-05-21 NOTE — PROGRESS NOTES
Received Paperwork   What type of form Disability   Scanned blank form into patient's Epic chart Yes   Method received form  In Person drop off   Provider responsible for form Dr. Fernandez   Informed patient our office turn around time for completing patient forms is 5-7 business days. Yes, informed patient of turn around time     Comments

## 2025-05-21 NOTE — LETTER
May 21, 2025     Zuri Lyon MD  [address] [facility]  1st Floor  [place] [address]    Patient: Yolanda Hubbard   YOB: 1970   Date of Visit: 2025       Dear MD Ahsan Mixon MD:    Thank you for referring Yolanda Hubbard to me for evaluation. Below are my notes for this consultation.    If you have questions, please do not hesitate to call me. I look forward to following your patient along with you.         Sincerely,        Cl Fernandez MD        CC: MD Cl Salas MD  2025  2:40 PM  Sign when Signing Visit  Name: Yolanda Hubbard      : 1970      MRN: [id number]  Encounter Provider: Cl Fernandez MD  Encounter Date: 2025   Encounter department: [facility] [place]  :  Assessment & Plan  Endometrial cancer (HCC)  54-year-old with stage Ib grade 1 endometrial cancer, 8 out of 13 mm depth of invasion, no LVSI, p53 wild-type, pMMR, ER positive, suspicious cytology.  She is status post robotic assisted total laparoscopic hysterectomy, bilateral salpingo-oophorectomy, bilateral sentinel pelvic lymph node biopsies, cystoscopy 2025.  She has a bilateral genitofemoral neuropathy.  Performance status is 0.  1.  We discussed the genitofemoral neuropathy.  She understands that it may take several months for the nerves to recover.  2.  We reviewed the pathology results in detail.  She understands that she is at low risk of recurrence even with suspicious peritoneal cytology and that the recommendation for adjuvant treatment is observation.  3. We further discussed the use of ctDNA and that it can be measured to assess the absence or presence of molecular residual disease. The sensitivity of this assay in gyn cancers has not been readily established but is utilized in other disease sites to monitor progression and treatment response. As a result, it may or may not give us clinical information that  is useful at this time but may provide added insight for the future.  She is interested in CT DNA testing.  4.  Return in 4 weeks for postoperative pelvic examination.           Assessment & Plan            History of Present Illness  Reason for Visit / CC: Treatment discussion, postoperative evaluation   Yolanda Hubbard is a 54 y.o. female   History of Present Illness  Returns for postoperative evaluation.  She notes skin tingling and burning at the anterior medial portion of the vulva/medial thigh bilaterally.  She is otherwise doing well.  She is tolerating regular diet, ambulating, voiding, having normal bowel movements, no vaginal bleeding.  She does not require pain medication.  Pertinent Medical History    Type 2 diabetes with neuropathy       Oncology History  Cancer Staging   Endometrial cancer ([facility])  Staging form: Corpus Uteri - Carcinoma, AJCC 8th Edition  - Pathologic: FIGO Stage IB (pT1b, pN0(sn), cM0) - Signed by Cl Fernandez MD on 5/21/2025  Method of lymph node assessment: [place] lymph node biopsy  Histologic grade (G): G1  Histologic grading system: 3 grade system  Oncology History   Endometrial cancer ([facility])   1/14/2025 Initial Diagnosis    Endometrial cancer ([facility])     5/5/2025 Surgery    Robotic assisted total laparoscopic hysterectomy, bilateral salpingo-oophorectomy, bilateral sentinel pelvic lymph node biopsies, cystoscopy  - Grade 1, 8 out of 13 mm depth of invasion, suspicious washings, no LVSI, pMMR, p53 wild-type, ER positive     5/21/2025 -  Cancer Staged    Staging form: Corpus Uteri - Carcinoma, AJCC 8th Edition  - Pathologic: FIGO Stage IB (pT1b, pN0(sn), cM0) - Signed by Cl Fernandez MD on 5/21/2025  Method of lymph node assessment: [place] lymph node biopsy  Histologic grade (G): G1  Histologic grading system: 3 grade system          Review of Systems A complete review of systems is negative other than that noted above in the HPI.  Past Medical History  Past  Medical History:   Diagnosis Date   • Diabetes mellitus (HCC)    • Hyperlipidemia    • Hypertension    • Lyme disease     treated 6 years ago - 1+ month medication     Past Surgical History:   Procedure Laterality Date   • CHOLECYSTECTOMY     • COLONOSCOPY     • DILATION AND CURETTAGE OF UTERUS     • LAPAROSCOPY     • [place] HYSTEROSCOPY BX ENDOMETRIUM&/POLYPC W/WO D&C N/A 2024    Procedure: EXAM UNDER ANESTHESIA; DILATATION AND CURETTAGE (D&C) WITH HYSTEROSCOPY;  Surgeon: Zuri Lyon MD;  Location: [facility];  Service: Gynecology   • [place] INJECTION PROCEDURE LYMPHANGIOGRAPHY N/A 2025    Procedure: LYMPHOGRAPHY WITH INDOCYANINE GREEN (ICG);  Surgeon: Cl Fernandez MD;  Location: [facility];  Service: Gynecology Oncology   • [place] LAPS TOTAL HYSTERECT 250 GM/< W/RMVL TUBE/OVARY N/A 2025    Procedure: ROBOTIC ASSISTED TOTAL LAPAROSCOPIC HYSTERECTOMY, BILATERAL SALPINGO-OOPHORECTOMY, BILATERAL SENTINEL PELVIC LYMPH NODE DISSECTION, CYSTOSCOPY;  Surgeon: Cl Fernandez MD;  Location: [facility];  Service: Gynecology Oncology   • WISDOM TOOTH EXTRACTION       Family History   Problem Relation Age of Onset   • Deep vein thrombosis Mother    • Hypertension Mother    • COPD Father    • Diabetes Father    • Hypertension Sister    • Heart failure Maternal Grandmother          age 69   • Heart disease Maternal Grandfather    • Heart disease Paternal Grandfather       reports that she quit smoking about 16 years ago. Her smoking use included cigarettes. She started smoking about 34 years ago. She has a 18 pack-year smoking history. She has never used smokeless tobacco. She reports that she does not drink alcohol and does not use drugs.  Current Outpatient Medications   Medication Instructions   • fenofibrate (TRICOR) 145 mg, Oral, Daily at bedtime   • gabapentin (NEURONTIN) 100 mg, Oral, 3 times daily   • lisinopril (ZESTRIL) 10 mg, Oral, Daily at bedtime   • Mounjaro 7.5 mg, Subcutaneous   •  "pravastatin (PRAVACHOL) 40 mg, Oral, Daily with dinner   Allergies[1]      Objective  /82 (BP Location: Right arm, Patient Position: Sitting, Cuff Size: Standard)   Pulse 81   Temp 98.1 °F (36.7 °C) (Temporal)   Wt 62.1 kg (137 lb)   LMP  (LMP Unknown) Comment: Abnormal bleeding- currently bleeding  SpO2 97%   BMI 25.06 kg/m²     Body mass index is 25.06 kg/m².  Pain Screening:  Pain Score: 0-No pain  ECOG   0  Physical Exam  Physical Exam       Results    Labs: I have reviewed pertinent labs.   No results found for: \"\"  Lab Results   Component Value Date/Time    Potassium 4.7 03/10/2025 11:46 AM    Chloride 104 03/10/2025 11:46 AM    Carbon Dioxide 24 03/10/2025 11:46 AM    BUN 14 03/10/2025 11:46 AM    Creatinine 0.88 03/10/2025 11:46 AM    Glucose, Fasting 135 (H) 01/22/2025 08:12 AM    Calcium 10.1 03/10/2025 11:46 AM    AST 20 03/10/2025 11:46 AM    ALT 19 03/10/2025 11:46 AM    Alkaline Phosphatase 50 03/10/2025 11:46 AM    eGFRcr 77 03/10/2025 11:46 AM     Lab Results   Component Value Date/Time    WBC 9.33 04/29/2025 09:18 AM    Hemoglobin 14.3 04/29/2025 09:18 AM    Hematocrit 41.1 04/29/2025 09:18 AM    MCV 89 04/29/2025 09:18 AM    Platelets 320 04/29/2025 09:18 AM     No results found for: \"NEUTROABS\"     Trend:  No results found for: \"\"      Other Study Results Review : Pathology reports reviewed.    Administrative Statements  I have spent a total time of 25 minutes in caring for this patient on the day of the visit/encounter including Diagnostic results, Prognosis, Risks and benefits of tx options, Instructions for management, Patient and family education, Impressions, Counseling / Coordination of care, Documenting in the medical record, Reviewing/placing orders in the medical record (including tests, medications, and/or procedures), and Obtaining or reviewing history  only brief time was spent on the postoperative history and physical examination.       [1] "   Allergies  Allergen Reactions   • Semaglutide Rash

## 2025-05-21 NOTE — TELEPHONE ENCOUNTER
Yolanda calling in, she is requesting her FMLA be extended to at least 6/24/25, she would like to revisit the discussion to return to work at her 6/23/25 post op.    Please call her to confirm that this has been completed [phone], thank you!

## 2025-05-21 NOTE — PROGRESS NOTES
Name: Yolanda Hubbard      : 1970      MRN: [id number]  Encounter Provider: Cl Fernandez MD  Encounter Date: 2025   Encounter department: [facility] [place]  :  Assessment & Plan  Endometrial cancer (HCC)  54-year-old with stage Ib grade 1 endometrial cancer, 8 out of 13 mm depth of invasion, no LVSI, p53 wild-type, pMMR, ER positive, suspicious cytology.  She is status post robotic assisted total laparoscopic hysterectomy, bilateral salpingo-oophorectomy, bilateral sentinel pelvic lymph node biopsies, cystoscopy 2025.  She has a bilateral genitofemoral neuropathy.  Performance status is 0.  1.  We discussed the genitofemoral neuropathy.  She understands that it may take several months for the nerves to recover.  2.  We reviewed the pathology results in detail.  She understands that she is at low risk of recurrence even with suspicious peritoneal cytology and that the recommendation for adjuvant treatment is observation.  3. We further discussed the use of ctDNA and that it can be measured to assess the absence or presence of molecular residual disease. The sensitivity of this assay in gyn cancers has not been readily established but is utilized in other disease sites to monitor progression and treatment response. As a result, it may or may not give us clinical information that is useful at this time but may provide added insight for the future.  She is interested in CT DNA testing.  4.  Return in 4 weeks for postoperative pelvic examination.           Assessment & Plan            History of Present Illness   Reason for Visit / CC: Treatment discussion, postoperative evaluation   Yolanda Hubbard is a 54 y.o. female   History of Present Illness  Returns for postoperative evaluation.  She notes skin tingling and burning at the anterior medial portion of the vulva/medial thigh bilaterally.  She is otherwise doing well.  She is tolerating regular diet, ambulating,  voiding, having normal bowel movements, no vaginal bleeding.  She does not require pain medication.  Pertinent Medical History     Type 2 diabetes with neuropathy       Oncology History   Cancer Staging   Endometrial cancer (HCC)  Staging form: Corpus Uteri - Carcinoma, AJCC 8th Edition  - Pathologic: FIGO Stage IB (pT1b, pN0(sn), cM0) - Signed by Cl Fernandez MD on 5/21/2025  Method of lymph node assessment: [place] lymph node biopsy  Histologic grade (G): G1  Histologic grading system: 3 grade system  Oncology History   Endometrial cancer (HCC)   1/14/2025 Initial Diagnosis    Endometrial cancer (HCC)     5/5/2025 Surgery    Robotic assisted total laparoscopic hysterectomy, bilateral salpingo-oophorectomy, bilateral sentinel pelvic lymph node biopsies, cystoscopy  - Grade 1, 8 out of 13 mm depth of invasion, suspicious washings, no LVSI, pMMR, p53 wild-type, ER positive     5/21/2025 -  Cancer Staged    Staging form: Corpus Uteri - Carcinoma, AJCC 8th Edition  - Pathologic: FIGO Stage IB (pT1b, pN0(sn), cM0) - Signed by Cl Fernandez MD on 5/21/2025  Method of lymph node assessment: [place] lymph node biopsy  Histologic grade (G): G1  Histologic grading system: 3 grade system          Review of Systems A complete review of systems is negative other than that noted above in the HPI.  Past Medical History   Past Medical History:   Diagnosis Date    Diabetes mellitus (HCC)     Hyperlipidemia     Hypertension     Lyme disease     treated 6 years ago - 1+ month medication     Past Surgical History:   Procedure Laterality Date    CHOLECYSTECTOMY      COLONOSCOPY      DILATION AND CURETTAGE OF UTERUS      LAPAROSCOPY      [place] HYSTEROSCOPY BX ENDOMETRIUM&/POLYPC W/WO D&C N/A 12/16/2024    Procedure: EXAM UNDER ANESTHESIA; DILATATION AND CURETTAGE (D&C) WITH HYSTEROSCOPY;  Surgeon: Zuri Lyon MD;  Location:  MAIN OR;  Service: Gynecology    [place] INJECTION PROCEDURE LYMPHANGIOGRAPHY N/A  "2025    Procedure: LYMPHOGRAPHY WITH INDOCYANINE GREEN (ICG);  Surgeon: Cl Fernandez MD;  [facility];  Service: Gynecology Oncology    [place] LAPS TOTAL HYSTERECT 250 GM/< W/RMVL TUBE/OVARY N/A 2025    Procedure: ROBOTIC ASSISTED TOTAL LAPAROSCOPIC HYSTERECTOMY, BILATERAL SALPINGO-OOPHORECTOMY, BILATERAL SENTINEL PELVIC LYMPH NODE DISSECTION, CYSTOSCOPY;  Surgeon: Cl Fernandez MD;  [facility];  Service: Gynecology Oncology    WISDOM TOOTH EXTRACTION       Family History   Problem Relation Age of Onset    Deep vein thrombosis Mother     Hypertension Mother     COPD Father     Diabetes Father     Hypertension Sister     Heart failure Maternal Grandmother          age 69    Heart disease Maternal Grandfather     Heart disease Paternal Grandfather       reports that she quit smoking about 16 years ago. Her smoking use included cigarettes. She started smoking about 34 years ago. She has a 18 pack-year smoking history. She has never used smokeless tobacco. She reports that she does not drink alcohol and does not use drugs.  Current Outpatient Medications   Medication Instructions    fenofibrate (TRICOR) 145 mg, Oral, Daily at bedtime    gabapentin (NEURONTIN) 100 mg, Oral, 3 times daily    lisinopril (ZESTRIL) 10 mg, Oral, Daily at bedtime    Mounjaro 7.5 mg, Subcutaneous    pravastatin (PRAVACHOL) 40 mg, Oral, Daily with dinner   Allergies[1]      Objective   /82 (BP Location: Right arm, Patient Position: Sitting, Cuff Size: Standard)   Pulse 81   Temp 98.1 °F (36.7 °C) (Temporal)   Wt 62.1 kg (137 lb)   LMP  (LMP Unknown) Comment: Abnormal bleeding- currently bleeding  SpO2 97%   BMI 25.06 kg/m²     Body mass index is 25.06 kg/m².  Pain Screening:  Pain Score: 0-No pain  ECOG   0  Physical Exam  Physical Exam       Results    Labs: I have reviewed pertinent labs.   No results found for: \"\"  Lab Results   Component Value Date/Time    Potassium 4.7 " "03/10/2025 11:46 AM    Chloride 104 03/10/2025 11:46 AM    Carbon Dioxide 24 03/10/2025 11:46 AM    BUN 14 03/10/2025 11:46 AM    Creatinine 0.88 03/10/2025 11:46 AM    Glucose, Fasting 135 (H) 01/22/2025 08:12 AM    Calcium 10.1 03/10/2025 11:46 AM    AST 20 03/10/2025 11:46 AM    ALT 19 03/10/2025 11:46 AM    Alkaline Phosphatase 50 03/10/2025 11:46 AM    eGFRcr 77 03/10/2025 11:46 AM     Lab Results   Component Value Date/Time    WBC 9.33 04/29/2025 09:18 AM    Hemoglobin 14.3 04/29/2025 09:18 AM    Hematocrit 41.1 04/29/2025 09:18 AM    MCV 89 04/29/2025 09:18 AM    Platelets 320 04/29/2025 09:18 AM     No results found for: \"NEUTROABS\"     Trend:  No results found for: \"\"      Other Study Results Review : Pathology reports reviewed.    Administrative Statements   I have spent a total time of 25 minutes in caring for this patient on the day of the visit/encounter including Diagnostic results, Prognosis, Risks and benefits of tx options, Instructions for management, Patient and family education, Impressions, Counseling / Coordination of care, Documenting in the medical record, Reviewing/placing orders in the medical record (including tests, medications, and/or procedures), and Obtaining or reviewing history  only brief time was spent on the postoperative history and physical examination.       [1]   Allergies  Allergen Reactions    Semaglutide Rash     "

## 2025-05-21 NOTE — ASSESSMENT & PLAN NOTE
54-year-old with stage Ib grade 1 endometrial cancer, 8 out of 13 mm depth of invasion, no LVSI, p53 wild-type, pMMR, ER positive, suspicious cytology.  She is status post robotic assisted total laparoscopic hysterectomy, bilateral salpingo-oophorectomy, bilateral sentinel pelvic lymph node biopsies, cystoscopy 5/5/2025.  She has a bilateral genitofemoral neuropathy.  Performance status is 0.  1.  We discussed the genitofemoral neuropathy.  She understands that it may take several months for the nerves to recover.  2.  We reviewed the pathology results in detail.  She understands that she is at low risk of recurrence even with suspicious peritoneal cytology and that the recommendation for adjuvant treatment is observation.  3. We further discussed the use of ctDNA and that it can be measured to assess the absence or presence of molecular residual disease. The sensitivity of this assay in gyn cancers has not been readily established but is utilized in other disease sites to monitor progression and treatment response. As a result, it may or may not give us clinical information that is useful at this time but may provide added insight for the future.  She is interested in CT DNA testing.  4.  Return in 4 weeks for postoperative pelvic examination.

## 2025-05-21 NOTE — TELEPHONE ENCOUNTER
Called and left a message that appointment was rescheduled.  Appointment was rescheduled to 5/21 at 2 pm in [place] with Dr. Fernandez.  Patient needs to confirm.

## 2025-05-27 ENCOUNTER — PATIENT OUTREACH (OUTPATIENT)
Dept: HEMATOLOGY ONCOLOGY | Facility: CLINIC | Age: 55
End: 2025-05-27

## 2025-05-27 NOTE — PROGRESS NOTES
I reached out to Yolanda tolbert review for any barriers to care and to offer any supportive services that may be needed.  Pt is aware of all scheduled appointments, no other barriers at this time.  She was thankful for the call, I will follow up as needed .

## 2025-05-29 ENCOUNTER — TELEPHONE (OUTPATIENT)
Dept: GYNECOLOGIC ONCOLOGY | Facility: CLINIC | Age: 55
End: 2025-05-29

## 2025-05-29 NOTE — TELEPHONE ENCOUNTER
Received Paperwork   What type of form Disability   Scanned blank form into patient's Epic chart Yes   Method received form In Person drop off   Provider responsible for form Dr Fernandez   Informed patient our office turn around time for completing patient forms is 5-7 business days. Yes, informed patient of turn around time     Comments Please fax to [phone] by 6/3 and call patient to confirm

## 2025-05-30 ENCOUNTER — TELEPHONE (OUTPATIENT)
Dept: GYNECOLOGIC ONCOLOGY | Facility: CLINIC | Age: 55
End: 2025-05-30

## 2025-05-30 NOTE — TELEPHONE ENCOUNTER
Called pt regarding her disability forms being completed and faxed on 5/29/2025 from Dr. Fernandez's office.    She was worried due to the paperwork being sent to her last minute and needing to be done by the 4th.    Patient was relieved and thanked me.

## 2025-06-06 ENCOUNTER — LAB REQUISITION (OUTPATIENT)
Dept: LAB | Facility: HOSPITAL | Age: 55
End: 2025-06-06

## 2025-06-06 DIAGNOSIS — C54.1 MALIGNANT NEOPLASM OF ENDOMETRIUM (HCC): ICD-10-CM

## 2025-06-17 ENCOUNTER — TELEPHONE (OUTPATIENT)
Age: 55
End: 2025-06-17

## 2025-06-17 ENCOUNTER — TELEPHONE (OUTPATIENT)
Dept: GYNECOLOGIC ONCOLOGY | Facility: CLINIC | Age: 55
End: 2025-06-17

## 2025-06-17 NOTE — TELEPHONE ENCOUNTER
Pt call was return about her M10 form.    Explained to the pt I spoke with disability about the issues with the date on her form. Disability was suppose to send a new one since the one sent to us had many parts scratched out.    The form was never sent to us so it was assumed it was dealt with.    Pt informed me they denied her STD and has two days to fill out and fax the forms.     I explained I will complete them ASAP and fax them over.    Called pt back @ 4:24 pm    LMOM informing her that her form was filled out and faxed and if she wanted a copy I can send one via my chart.

## 2025-06-17 NOTE — TELEPHONE ENCOUNTER
Patient calling to request Dr. Fernandez complete the M10 form for disability.   She states her disability was denied because this M10 form- which is in her chart 5/29/25 was not completed.   She has 2 days to have form returned.  She would like a call asap.

## 2025-06-22 NOTE — ASSESSMENT & PLAN NOTE
54-year-old with stage Ib grade 1 endometrial cancer, pMMR, ER positive, p53 wild-type.  She is status post robotic assisted total laparoscopic hysterectomy, bilateral salpingo-oophorectomy, bilateral sentinel pelvic lymph node biopsies, cystoscopy 5/5/2025.  MRD testing is pending.  She is recovering well from surgery.  Performance status is 0.  1.  Return in 6 months for endometrial cancer surveillance  2.  Follow-up results of MRD testing.       
yes

## 2025-06-22 NOTE — PROGRESS NOTES
Name: Yolanda Hubbard      : 1970      MRN: [id number]  Encounter Provider: Cl Fernandez MD  Encounter Date: 2025   [facility]  :  Assessment & Plan  Endometrial cancer (HCC)  54-year-old with stage Ib grade 1 endometrial cancer, pMMR, ER positive, p53 wild-type.  She is status post robotic assisted total laparoscopic hysterectomy, bilateral salpingo-oophorectomy, bilateral sentinel pelvic lymph node biopsies, cystoscopy 2025.  MRD testing is pending.  She is recovering well from surgery.  Performance status is 0.  1.  Return in 6 months for endometrial cancer surveillance  2.  Follow-up results of MRD testing.         Assessment & Plan            History of Present Illness   Reason for Visit / CC: Postoperative evaluation   Yolanad Hubbard is a 54 y.o. female who returns for postoperative evaluation.  She is ambulating, voiding, having bowel movements.  She is performing her actives of daily living without difficulty.  She has some numbness of the lower anterior abdominal wall.  She states that it is improving.  No vaginal bleeding.  No other interval change in medications or medical history since her last visit.  History of Present Illness    Pertinent Medical History     Type 2 diabetes with neuropathy         Oncology History   Cancer Staging   Endometrial cancer (HCC)  Staging form: Corpus Uteri - Carcinoma, AJCC 8th Edition  - Pathologic: FIGO Stage IB (pT1b, pN0(sn), cM0) - Signed by Cl Fernandez MD on 2025  Method of lymph node assessment: [place] lymph node biopsy  Histologic grade (G): G1  Histologic grading system: 3 grade system  Oncology History   Endometrial cancer (HCC)   2025 Initial Diagnosis    Endometrial cancer (HCC)     2025 Surgery    Robotic assisted total laparoscopic hysterectomy, bilateral salpingo-oophorectomy, bilateral sentinel pelvic lymph node biopsies, cystoscopy  - Grade 1, 8  out of 13 mm depth of invasion, suspicious washings, no LVSI, pMMR, p53 wild-type, ER positive     5/21/2025 -  Cancer Staged    Staging form: Corpus Uteri - Carcinoma, AJCC 8th Edition  - Pathologic: FIGO Stage IB (pT1b, pN0(sn), cM0) - Signed by Cl Fernandez MD on 5/21/2025  Method of lymph node assessment: [place] lymph node biopsy  Histologic grade (G): G1  Histologic grading system: 3 grade system          Review of Systems A complete review of systems is negative other than that noted above in the HPI.  Medications Ordered Prior to Encounter[1]      Objective   LMP  (LMP Unknown) Comment: Abnormal bleeding- currently bleeding    There is no height or weight on file to calculate BMI.  Pain Screening:     ECOG   0  Physical Exam  Vitals reviewed. Exam conducted with a chaperone present.   Constitutional:       General: She is not in acute distress.     Appearance: Normal appearance.   HENT:      Head: Normocephalic and atraumatic.      Mouth/Throat:      Mouth: Mucous membranes are moist.   Abdominal:      Palpations: Abdomen is soft. There is no mass.      Tenderness: There is no abdominal tenderness.   Genitourinary:     Comments: The external female genitalia is normal. The bartholin's, uretheral and skenes glands are normal. The urethral meatus is normal (midline with no lesions). Anus without fissure or lesion. Speculum exam reveals a grossly normal vagina. Vagina is intact, without dehiscense. No masses, lesions, discharge or bleeding. No significant cystocele or rectocele noted. Bimanual exam notes a surgical absent cervix, uterus and adnexal structures. No masses or fullness. Bladder is without fullness, mass or tenderness.    Skin:     General: Skin is warm and dry.      Comments: Surgical trocar sites are well healed.      Neurological:      Mental Status: She is alert and oriented to person, place, and time.     Psychiatric:         Mood and Affect: Mood normal.         Behavior: Behavior  "normal.         Thought Content: Thought content normal.         Judgment: Judgment normal.       Physical Exam       Results    Labs: I have reviewed pertinent labs.   No results found for: \"\"  Lab Results   Component Value Date/Time    Potassium 4.7 03/10/2025 11:46 AM    Chloride 104 03/10/2025 11:46 AM    Carbon Dioxide 24 03/10/2025 11:46 AM    BUN 14 03/10/2025 11:46 AM    Creatinine 0.88 03/10/2025 11:46 AM    Glucose, Fasting 135 (H) 01/22/2025 08:12 AM    Calcium 10.1 03/10/2025 11:46 AM    AST 20 03/10/2025 11:46 AM    ALT 19 03/10/2025 11:46 AM    Alkaline Phosphatase 50 03/10/2025 11:46 AM    eGFRcr 77 03/10/2025 11:46 AM     Lab Results   Component Value Date/Time    WBC 9.33 04/29/2025 09:18 AM    Hemoglobin 14.3 04/29/2025 09:18 AM    Hematocrit 41.1 04/29/2025 09:18 AM    MCV 89 04/29/2025 09:18 AM    Platelets 320 04/29/2025 09:18 AM     No results found for: \"NEUTROABS\"     Trend:  No results found for: \"\"                 [1]   Current Outpatient Medications on File Prior to Visit   Medication Sig Dispense Refill    fenofibrate (TRICOR) 145 mg tablet Take 145 mg by mouth daily at bedtime      gabapentin (NEURONTIN) 100 mg capsule Take 1 capsule (100 mg total) by mouth 3 (three) times a day 90 capsule 0    lisinopril (ZESTRIL) 10 mg tablet Take 10 mg by mouth daily at bedtime      Mounjaro 7.5 MG/0.5ML Inject 7.5 mg under the skin      pravastatin (PRAVACHOL) 40 mg tablet Take 1 tablet (40 mg total) by mouth daily with dinner 30 tablet 0     No current facility-administered medications on file prior to visit.     "

## 2025-06-23 ENCOUNTER — OFFICE VISIT (OUTPATIENT)
Dept: GYNECOLOGIC ONCOLOGY | Facility: CLINIC | Age: 55
End: 2025-06-23

## 2025-06-23 VITALS
HEART RATE: 79 BPM | WEIGHT: 135 LBS | DIASTOLIC BLOOD PRESSURE: 80 MMHG | TEMPERATURE: 98.4 F | SYSTOLIC BLOOD PRESSURE: 138 MMHG | BODY MASS INDEX: 24.69 KG/M2 | OXYGEN SATURATION: 97 %

## 2025-06-23 DIAGNOSIS — C54.1 ENDOMETRIAL CANCER (HCC): Primary | ICD-10-CM

## 2025-06-23 PROCEDURE — 99024 POSTOP FOLLOW-UP VISIT: CPT | Performed by: OBSTETRICS & GYNECOLOGY

## 2025-06-23 NOTE — LETTER
2025     Zuri Lyon MD  [address] [facility]  1st [address]  [place] [address]    Patient: Yolanda Hubbard   YOB: 1970   Date of Visit: 2025       Dear MD Ahsan Mixon MD:    Thank you for referring Yolanda Hubbard to me for evaluation. Below are my notes for this consultation.    If you have questions, please do not hesitate to call me. I look forward to following your patient along with you.         Sincerely,        Cl Fernandez MD        CC: MD Cl Salas MD  2025  9:59 AM  Sign when Signing Visit  Name: Yolanda Hubbard      : 1970      MRN: [id number]  Encounter Provider: Cl Fernandez MD  Encounter Date: 2025   Encounter department: [facility] [place]  :  Assessment & Plan  Endometrial cancer (HCC)  54-year-old with stage Ib grade 1 endometrial cancer, pMMR, ER positive, p53 wild-type.  She is status post robotic assisted total laparoscopic hysterectomy, bilateral salpingo-oophorectomy, bilateral sentinel pelvic lymph node biopsies, cystoscopy 2025.  MRD testing is pending.  She is recovering well from surgery.  Performance status is 0.  1.  Return in 6 months for endometrial cancer surveillance  2.  Follow-up results of MRD testing.         Assessment & Plan            History of Present Illness  Reason for Visit / CC: Postoperative evaluation   Yolanda Hubbard is a 54 y.o. female who returns for postoperative evaluation.  She is ambulating, voiding, having bowel movements.  She is performing her actives of daily living without difficulty.  She has some numbness of the lower anterior abdominal wall.  She states that it is improving.  No vaginal bleeding.  No other interval change in medications or medical history since her last visit.  History of Present Illness    Pertinent Medical History    Type 2 diabetes with neuropathy         Oncology History  Cancer Staging    Endometrial cancer (HCC)  Staging form: Corpus Uteri - Carcinoma, AJCC 8th Edition  - Pathologic: FIGO Stage IB (pT1b, pN0(sn), cM0) - Signed by Cl Fernandez MD on 5/21/2025  Method of lymph node assessment: [place] lymph node biopsy  Histologic grade (G): G1  Histologic grading system: 3 grade system  Oncology History   Endometrial cancer (HCC)   1/14/2025 Initial Diagnosis    Endometrial cancer (HCC)     5/5/2025 Surgery    Robotic assisted total laparoscopic hysterectomy, bilateral salpingo-oophorectomy, bilateral sentinel pelvic lymph node biopsies, cystoscopy  - Grade 1, 8 out of 13 mm depth of invasion, suspicious washings, no LVSI, pMMR, p53 wild-type, ER positive     5/21/2025 -  Cancer Staged    Staging form: Corpus Uteri - Carcinoma, AJCC 8th Edition  - Pathologic: FIGO Stage IB (pT1b, pN0(sn), cM0) - Signed by Cl Fernandez MD on 5/21/2025  Method of lymph node assessment: [place] lymph node biopsy  Histologic grade (G): G1  Histologic grading system: 3 grade system          Review of Systems A complete review of systems is negative other than that noted above in the HPI.  Medications Ordered Prior to Encounter[1]      Objective  LMP  (LMP Unknown) Comment: Abnormal bleeding- currently bleeding    There is no height or weight on file to calculate BMI.  Pain Screening:     ECOG   0  Physical Exam  Vitals reviewed. Exam conducted with a chaperone present.   Constitutional:       General: She is not in acute distress.     Appearance: Normal appearance.   HENT:      Head: Normocephalic and atraumatic.      Mouth/Throat:      Mouth: Mucous membranes are moist.   Abdominal:      Palpations: Abdomen is soft. There is no mass.      Tenderness: There is no abdominal tenderness.   Genitourinary:     Comments: The external female genitalia is normal. The bartholin's, uretheral and skenes glands are normal. The urethral meatus is normal (midline with no lesions). Anus without fissure or  "lesion. Speculum exam reveals a grossly normal vagina. Vagina is intact, without dehiscense. No masses, lesions, discharge or bleeding. No significant cystocele or rectocele noted. Bimanual exam notes a surgical absent cervix, uterus and adnexal structures. No masses or fullness. Bladder is without fullness, mass or tenderness.    Skin:     General: Skin is warm and dry.      Comments: Surgical trocar sites are well healed.      Neurological:      Mental Status: She is alert and oriented to person, place, and time.     Psychiatric:         Mood and Affect: Mood normal.         Behavior: Behavior normal.         Thought Content: Thought content normal.         Judgment: Judgment normal.       Physical Exam       Results    Labs: I have reviewed pertinent labs.   No results found for: \"\"  Lab Results   Component Value Date/Time    Potassium 4.7 03/10/2025 11:46 AM    Chloride 104 03/10/2025 11:46 AM    Carbon Dioxide 24 03/10/2025 11:46 AM    BUN 14 03/10/2025 11:46 AM    Creatinine 0.88 03/10/2025 11:46 AM    Glucose, Fasting 135 (H) 01/22/2025 08:12 AM    Calcium 10.1 03/10/2025 11:46 AM    AST 20 03/10/2025 11:46 AM    ALT 19 03/10/2025 11:46 AM    Alkaline Phosphatase 50 03/10/2025 11:46 AM    eGFRcr 77 03/10/2025 11:46 AM     Lab Results   Component Value Date/Time    WBC 9.33 04/29/2025 09:18 AM    Hemoglobin 14.3 04/29/2025 09:18 AM    Hematocrit 41.1 04/29/2025 09:18 AM    MCV 89 04/29/2025 09:18 AM    Platelets 320 04/29/2025 09:18 AM     No results found for: \"NEUTROABS\"     Trend:  No results found for: \"\"                 [1]   Current Outpatient Medications on File Prior to Visit   Medication Sig Dispense Refill   • fenofibrate (TRICOR) 145 mg tablet Take 145 mg by mouth daily at bedtime     • gabapentin (NEURONTIN) 100 mg capsule Take 1 capsule (100 mg total) by mouth 3 (three) times a day 90 capsule 0   • lisinopril (ZESTRIL) 10 mg tablet Take 10 mg by mouth daily at bedtime     • Mounjaro " 7.5 MG/0.5ML Inject 7.5 mg under the skin     • pravastatin (PRAVACHOL) 40 mg tablet Take 1 tablet (40 mg total) by mouth daily with dinner 30 tablet 0     No current facility-administered medications on file prior to visit.

## 2025-07-01 LAB — SCAN RESULT: NORMAL

## (undated) DEVICE — GLOVE INDICATOR PI UNDERGLOVE SZ 8 BLUE

## (undated) DEVICE — OCCLUDER COLPO-PNEUMO

## (undated) DEVICE — EXOFIN PRECISION PEN HIGH VISCOSITY TOPICAL SKIN ADHESIVE: Brand: EXOFIN PRECISION PEN, 1G

## (undated) DEVICE — ARM DRAPE

## (undated) DEVICE — KIT, BETHLEHEM ROBOTIC PROST: Brand: CARDINAL HEALTH

## (undated) DEVICE — CHLORHEXIDINE 4PCT 4 OZ

## (undated) DEVICE — REM POLYHESIVE ADULT PATIENT RETURN ELECTRODE: Brand: VALLEYLAB

## (undated) DEVICE — PROGRASP FORCEPS: Brand: ENDOWRIST

## (undated) DEVICE — INTENDED FOR TISSUE SEPARATION, AND OTHER PROCEDURES THAT REQUIRE A SHARP SURGICAL BLADE TO PUNCTURE OR CUT.: Brand: BARD-PARKER SAFETY BLADES SIZE 11, STERILE

## (undated) DEVICE — 40595 XL TRENDELENBURG POSITIONING KIT: Brand: 40595 XL TRENDELENBURG POSITIONING KIT

## (undated) DEVICE — SUT STRATAFIX SPIRAL 2-0 CT-1 30 CM SXPP1B410

## (undated) DEVICE — LUBRICANT JELLY SURGILUBE TUBE 2OZ FLIP TOP

## (undated) DEVICE — TIP COVER ACCESSORY

## (undated) DEVICE — 2, DISPOSABLE SUCTION/IRRIGATOR WITHOUT DISPOSABLE TIP: Brand: STRYKEFLOW

## (undated) DEVICE — 3M™ STERI-DRAPE™ UNDER BUTTOCKS DRAPE WITH POUCH 1084: Brand: STERI-DRAPE™

## (undated) DEVICE — VISUALIZATION SYSTEM: Brand: CLEARIFY

## (undated) DEVICE — GLOVE INDICATOR PI UNDERGLOVE SZ 7 BLUE

## (undated) DEVICE — AIRSEAL TUBE SMOKE EVAC LUMENX3 FILTERED

## (undated) DEVICE — MONOPOLAR CURVED SCISSORS: Brand: ENDOWRIST

## (undated) DEVICE — PREMIUM DRY TRAY LF: Brand: MEDLINE INDUSTRIES, INC.

## (undated) DEVICE — GLOVE INDICATOR PI UNDERGLOVE SZ 6.5 BLUE

## (undated) DEVICE — COLUMN DRAPE

## (undated) DEVICE — 1820 FOAM BLOCK NEEDLE COUNTER: Brand: DEVON

## (undated) DEVICE — MAYO STAND COVER: Brand: CONVERTORS

## (undated) DEVICE — SEAL

## (undated) DEVICE — TROCAR PORT ACCESS 5 X120MML W/BLDLS OPTICAL TIP AIRSEAL

## (undated) DEVICE — SUT MONOCRYL 4-0 PS-2 27 IN Y426H

## (undated) DEVICE — BLADELESS OBTURATOR: Brand: WECK VISTA

## (undated) DEVICE — INTENDED FOR TISSUE SEPARATION, AND OTHER PROCEDURES THAT REQUIRE A SHARP SURGICAL BLADE TO PUNCTURE OR CUT.: Brand: BARD-PARKER SAFETY BLADES SIZE 15, STERILE

## (undated) DEVICE — ANTIBACTERIAL VIOLET BRAIDED (POLYGLACTIN 910), SYNTHETIC ABSORBABLE SUTURE: Brand: COATED VICRYL

## (undated) DEVICE — PVC URETHRAL CATHETER: Brand: DOVER

## (undated) DEVICE — GLOVE PI ULTRA TOUCH SZ 6

## (undated) DEVICE — GLOVE PI ULTRA TOUCH SZ.7.5

## (undated) DEVICE — TISSUE REMOVAL SYSTEM FLUID MANAGEMENT ACCESSORIES: Brand: SYMPHION

## (undated) DEVICE — UTERINE MANIPULATOR RUMI 6.7 X 8 CM

## (undated) DEVICE — SYRINGE 50ML LL

## (undated) DEVICE — LARGE NEEDLE DRIVER: Brand: ENDOWRIST

## (undated) DEVICE — CYSTO TUBING SINGLE IRRIGATION

## (undated) DEVICE — FENESTRATED BIPOLAR FORCEPS: Brand: ENDOWRIST

## (undated) DEVICE — STRL ALLENTOWN HYSTEROSCOPY PK: Brand: CARDINAL HEALTH

## (undated) DEVICE — ELECTRO LUBE IS A SINGLE PATIENT USE DEVICE THAT IS INTENDED TO BE USED ON ELECTROSURGICAL ELECTRODES TO REDUCE STICKING.: Brand: KEY SURGICAL ELECTRO LUBE

## (undated) DEVICE — TRAY FOLEY 16FR URIMETER SILICONE SURESTEP

## (undated) DEVICE — LIGHT HANDLE COVER SLEEVE DISP BLUE STELLAR